# Patient Record
Sex: MALE | Race: WHITE | HISPANIC OR LATINO | URBAN - METROPOLITAN AREA
[De-identification: names, ages, dates, MRNs, and addresses within clinical notes are randomized per-mention and may not be internally consistent; named-entity substitution may affect disease eponyms.]

---

## 2017-03-22 ENCOUNTER — INPATIENT (INPATIENT)
Facility: HOSPITAL | Age: 82
LOS: 13 days | Discharge: NURSING, REHAB OR LONG-TERM CARE | DRG: 242 | End: 2017-04-05
Attending: FAMILY MEDICINE | Admitting: FAMILY MEDICINE
Payer: COMMERCIAL

## 2017-03-22 VITALS
OXYGEN SATURATION: 99 % | HEART RATE: 87 BPM | DIASTOLIC BLOOD PRESSURE: 66 MMHG | TEMPERATURE: 97 F | RESPIRATION RATE: 17 BRPM | HEIGHT: 60 IN | SYSTOLIC BLOOD PRESSURE: 111 MMHG | WEIGHT: 125 LBS

## 2017-03-22 DIAGNOSIS — R55 SYNCOPE AND COLLAPSE: ICD-10-CM

## 2017-03-22 PROCEDURE — 99285 EMERGENCY DEPT VISIT HI MDM: CPT

## 2017-03-22 PROCEDURE — 72125 CT NECK SPINE W/O DYE: CPT | Mod: 26

## 2017-03-22 PROCEDURE — 70450 CT HEAD/BRAIN W/O DYE: CPT | Mod: 26

## 2017-03-22 PROCEDURE — 71020: CPT | Mod: 26

## 2017-03-22 NOTE — ED PROVIDER NOTE - CONDUCTED A DETAILED DISCUSSION WITH PATIENT AND/OR GUARDIAN REGARDING, MDM
radiology results/lab results/need for outpatient follow-up/need to admit/return to ED if symptoms worsen, persist or questions arise

## 2017-03-22 NOTE — ED ADULT NURSE NOTE - OBJECTIVE STATEMENT
Patient complains of dizziness x 2 months, loss of appetite x 1 month. Patient ambulates with assistance, family at bedside. Breathing easy and unlabored, speaking in full sentences, no use of accessory muscles.

## 2017-03-22 NOTE — ED PROVIDER NOTE - NS ED MD SCRIBE ATTENDING SCRIBE SECTIONS
HISTORY OF PRESENT ILLNESS/DISPOSITION/PAST MEDICAL/SURGICAL/SOCIAL HISTORY/VITAL SIGNS( Pullset)/PHYSICAL EXAM/REVIEW OF SYSTEMS/RESULTS

## 2017-03-22 NOTE — ED PROVIDER NOTE - CHPI ED SYMPTOMS NEG
no numbness/no LOC, no tingling, no shortness of breath, no chest pain, no chills, no vomiting/no fever/no nausea/no weakness

## 2017-03-22 NOTE — ED PROVIDER NOTE - MUSCULOSKELETAL, MLM
Spine appears normal, range of motion is not limited, no muscle or joint tenderness. Moving all extremities well; no obvious deformation of extremities. Strength normal.

## 2017-03-22 NOTE — ED PROVIDER NOTE - MEDICAL DECISION MAKING DETAILS
88 y/o M pt c/o dizziness and lightheadedness resulting in mechanical fall today; L lower leg pain after fall. Plan to admit for presyncope, look at EKG, CT Head, CT Spine, labs (troponin orthostatics), urine, and CXR.

## 2017-03-23 DIAGNOSIS — E03.9 HYPOTHYROIDISM, UNSPECIFIED: ICD-10-CM

## 2017-03-23 DIAGNOSIS — E11.9 TYPE 2 DIABETES MELLITUS WITHOUT COMPLICATIONS: ICD-10-CM

## 2017-03-23 DIAGNOSIS — Z41.8 ENCOUNTER FOR OTHER PROCEDURES FOR PURPOSES OTHER THAN REMEDYING HEALTH STATE: ICD-10-CM
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
## 2017-03-23 DIAGNOSIS — I10 ESSENTIAL (PRIMARY) HYPERTENSION: ICD-10-CM

## 2017-03-23 RX ORDER — ASPIRIN/CALCIUM CARB/MAGNESIUM 324 MG
81 TABLET ORAL DAILY
Qty: 0 | Refills: 0 | Status: DISCONTINUED | OUTPATIENT
Start: 2017-03-23 | End: 2017-04-05

## 2017-03-23 RX ORDER — ACETAMINOPHEN 500 MG
650 TABLET ORAL EVERY 6 HOURS
Qty: 0 | Refills: 0 | Status: DISCONTINUED | OUTPATIENT
Start: 2017-03-23 | End: 2017-04-05

## 2017-03-23 RX ORDER — LOSARTAN POTASSIUM 100 MG/1
25 TABLET, FILM COATED ORAL DAILY
Qty: 0 | Refills: 0 | Status: DISCONTINUED | OUTPATIENT
Start: 2017-03-23 | End: 2017-03-31

## 2017-03-23 RX ORDER — GLUCAGON INJECTION, SOLUTION 0.5 MG/.1ML
1 INJECTION, SOLUTION SUBCUTANEOUS ONCE
Qty: 0 | Refills: 0 | Status: DISCONTINUED | OUTPATIENT
Start: 2017-03-23 | End: 2017-04-05

## 2017-03-23 RX ORDER — FINASTERIDE 5 MG/1
5 TABLET, FILM COATED ORAL DAILY
Qty: 0 | Refills: 0 | Status: DISCONTINUED | OUTPATIENT
Start: 2017-03-23 | End: 2017-04-05

## 2017-03-23 RX ORDER — DEXTROSE 50 % IN WATER 50 %
1 SYRINGE (ML) INTRAVENOUS ONCE
Qty: 0 | Refills: 0 | Status: DISCONTINUED | OUTPATIENT
Start: 2017-03-23 | End: 2017-04-05

## 2017-03-23 RX ORDER — LEVOTHYROXINE SODIUM 125 MCG
25 TABLET ORAL DAILY
Qty: 0 | Refills: 0 | Status: DISCONTINUED | OUTPATIENT
Start: 2017-03-23 | End: 2017-04-05

## 2017-03-23 RX ORDER — INSULIN LISPRO 100/ML
VIAL (ML) SUBCUTANEOUS
Qty: 0 | Refills: 0 | Status: DISCONTINUED | OUTPATIENT
Start: 2017-03-23 | End: 2017-04-05

## 2017-03-23 RX ORDER — DEXTROSE 50 % IN WATER 50 %
25 SYRINGE (ML) INTRAVENOUS ONCE
Qty: 0 | Refills: 0 | Status: DISCONTINUED | OUTPATIENT
Start: 2017-03-23 | End: 2017-04-05

## 2017-03-23 RX ORDER — PANTOPRAZOLE SODIUM 20 MG/1
40 TABLET, DELAYED RELEASE ORAL
Qty: 0 | Refills: 0 | Status: DISCONTINUED | OUTPATIENT
Start: 2017-03-23 | End: 2017-04-05

## 2017-03-23 RX ORDER — SODIUM CHLORIDE 9 MG/ML
1000 INJECTION INTRAMUSCULAR; INTRAVENOUS; SUBCUTANEOUS
Qty: 0 | Refills: 0 | Status: DISCONTINUED | OUTPATIENT
Start: 2017-03-23 | End: 2017-03-28

## 2017-03-23 RX ORDER — DEXTROSE 50 % IN WATER 50 %
12.5 SYRINGE (ML) INTRAVENOUS ONCE
Qty: 0 | Refills: 0 | Status: DISCONTINUED | OUTPATIENT
Start: 2017-03-23 | End: 2017-04-05

## 2017-03-23 RX ORDER — DOCUSATE SODIUM 100 MG
100 CAPSULE ORAL DAILY
Qty: 0 | Refills: 0 | Status: DISCONTINUED | OUTPATIENT
Start: 2017-03-23 | End: 2017-04-05

## 2017-03-23 RX ORDER — ATORVASTATIN CALCIUM 80 MG/1
20 TABLET, FILM COATED ORAL AT BEDTIME
Qty: 0 | Refills: 0 | Status: DISCONTINUED | OUTPATIENT
Start: 2017-03-23 | End: 2017-04-05

## 2017-03-23 RX ORDER — SODIUM CHLORIDE 9 MG/ML
1000 INJECTION, SOLUTION INTRAVENOUS
Qty: 0 | Refills: 0 | Status: DISCONTINUED | OUTPATIENT
Start: 2017-03-23 | End: 2017-04-05

## 2017-03-23 RX ADMIN — Medication 25 MICROGRAM(S): at 13:44

## 2017-03-23 RX ADMIN — SODIUM CHLORIDE 75 MILLILITER(S): 9 INJECTION INTRAMUSCULAR; INTRAVENOUS; SUBCUTANEOUS at 18:01

## 2017-03-23 RX ADMIN — Medication 1 DROP(S): at 17:56

## 2017-03-23 RX ADMIN — Medication 650 MILLIGRAM(S): at 22:25

## 2017-03-23 RX ADMIN — SODIUM CHLORIDE 75 MILLILITER(S): 9 INJECTION INTRAMUSCULAR; INTRAVENOUS; SUBCUTANEOUS at 21:22

## 2017-03-23 RX ADMIN — FINASTERIDE 5 MILLIGRAM(S): 5 TABLET, FILM COATED ORAL at 17:56

## 2017-03-23 RX ADMIN — ATORVASTATIN CALCIUM 20 MILLIGRAM(S): 80 TABLET, FILM COATED ORAL at 21:22

## 2017-03-23 RX ADMIN — Medication 1 DROP(S): at 21:22

## 2017-03-23 RX ADMIN — Medication 100 MILLIGRAM(S): at 13:40

## 2017-03-23 RX ADMIN — LOSARTAN POTASSIUM 25 MILLIGRAM(S): 100 TABLET, FILM COATED ORAL at 13:40

## 2017-03-23 RX ADMIN — Medication 650 MILLIGRAM(S): at 21:21

## 2017-03-23 RX ADMIN — Medication 81 MILLIGRAM(S): at 11:46

## 2017-03-23 NOTE — H&P ADULT. - HISTORY OF PRESENT ILLNESS
88 y/o Mauritian speaking M from home walks independently at the baseline   with PMHx of DM, Hypothyroidism, and HTN and no PSHx presents to ED c/o dizziness and lightheadedness resulting in mechanical fall today. Pt claims L lower leg pain s/p fall as well. Pt also claims his dizziness has been ongoing for 2 months. Pt denies LOC, numbness, tingling, weakness, SOB, CP, fevers, chills, nausea, vomiting, or any other complaints. Pt also denies recent illness 88 y/o Yakut speaking M from home walks independently at the baseline , inter pretor number [id number]    with PMHx of DM, Hypothyroidism, and HTN and no PSHx presents to ED c/o dizziness and lightheadedness resulting in mechanical fall today. Pt claims L lower leg pain s/p fall as well. Pt also admits that  his dizziness has been ongoing for 2 months. Pt denies LOC, numbness, tingling, weakness, SOB, Chest pain, fevers, chills, nausea, vomiting, or any other complaints. Pt also denies  any other acute complaints.  patient has also has an episode of fall few months ago when he was trying to get down from the stairs and denies any loss of consiousness or associated symptoms at that time . he also says he has a cardiac problems but unable to describe them properly at this time .  PMH-DM, Hypothyroidism, and HTN  FH- no pertinent family h/o   social h/o - non -smoker , no alcoholic or illicit drug use   IN the E.D patient vitals stable and EKG  NSR WITH prolonged AL interval 242 ms {first degree AV block}  patient unable to remember the name of medications he use at home { carleen andre , [phone] primary team to follow

## 2017-03-23 NOTE — H&P ADULT. - PROBLEM SELECTOR PLAN 5
IMPROVE SCORE OF 1 AND 3 MONTH RISK OF 0.6 %  -C/W dvt prophylaxsis heparin 5000 units 3 times a day

## 2017-03-23 NOTE — H&P ADULT. - RS GEN PE MLT RESP DETAILS PC
clear to auscultation bilaterally/breath sounds equal/no rales/no wheezes/good air movement/no rhonchi/normal/respirations non-labored

## 2017-03-23 NOTE — H&P ADULT. - ASSESSMENT
88 y/o Albanian speaking M from home walks independently at the baseline   with PMHx of DM, Hypothyroidism, and HTN and no PSHx presents to ED c/o dizziness and lightheadedness resulting in mechanical fall today. Pt claims L lower leg pain s/p fall as well. Pt also admits that  his dizziness has been ongoing for 2 months. Pt denies LOC, numbness, tingling, weakness, SOB, Chest pain, fevers, chills, nausea, vomiting, or any other complaints. Pt also denies recent illness .

## 2017-03-23 NOTE — H&P ADULT. - NEGATIVE NEUROLOGICAL SYMPTOMS
no vertigo/no syncope/no generalized seizures/no transient paralysis/no focal seizures/no paresthesias/no tremors/no weakness

## 2017-03-23 NOTE — H&P ADULT. - NEGATIVE GASTROINTESTINAL SYMPTOMS
no diarrhea/no change in bowel habits/no flatulence/no constipation/no abdominal pain/no nausea/no vomiting

## 2017-03-23 NOTE — H&P ADULT. - PROBLEM SELECTOR PLAN 3
- f/u with TSH LEVEL IN AM  - - f/u with TSH LEVEL IN AM  -please contact the pharmacy in am and called the family twice with no response

## 2017-03-23 NOTE — H&P ADULT. - NEGATIVE ENMT SYMPTOMS
no tinnitus/no ear pain/no sinus symptoms/no hearing difficulty/no nasal congestion/no nasal discharge/no vertigo

## 2017-03-23 NOTE — H&P ADULT. - MUSCULOSKELETAL
details… detailed exam normal/ROM intact/no joint erythema/no joint swelling/no joint warmth/no calf tenderness

## 2017-03-23 NOTE — H&P ADULT. - NEGATIVE CARDIOVASCULAR SYMPTOMS
no palpitations/no orthopnea/no dyspnea on exertion/no peripheral edema/no paroxysmal nocturnal dyspnea/no chest pain

## 2017-03-23 NOTE — H&P ADULT. - PROBLEM SELECTOR PLAN 1
- s/p fall { mechanical}  - f/u orthostatics in am  -likely secondary to delayed postural reflexes in old age or secondary  to medication induced{ HTN medications }and also to rule out any diabetic neuropathy   - fall precautions   - primary team to obtain medications from pharmacy   -f/u vitamin d level , b12 and hba1c level   - f/u with PT  evaluation - s/p fall { mechanical}  -orthostatics lying and sitting -negative    -likely secondary to delayed postural reflexes in old age or secondary  to medication induced{ HTN medications }and also to rule out any diabetic neuropathy   - fall precautions   - primary team to obtain medications from pharmacy   -f/u vitamin d level , b12 and hba1c level   - f/u with PT  evaluation - s/p fall { mechanical}  -orthostatics lying and sitting -negative    -likely secondary to delayed postural reflexes in old age or secondary  to medication induced{ HTN medications }and also to rule out any diabetic neuropathy   - fall precautions  - neuro:    - primary team to obtain medications from pharmacy   -f/u vitamin d level , b12 and hba1c level   - f/u with PT  evaluation

## 2017-03-24 LAB
ANION GAP SERPL CALC-SCNC: 9 MMOL/L — SIGNIFICANT CHANGE UP (ref 5–17)
BUN SERPL-MCNC: 27 MG/DL — HIGH (ref 7–18)
CALCIUM SERPL-MCNC: 8.4 MG/DL — SIGNIFICANT CHANGE UP (ref 8.4–10.5)
CHLORIDE SERPL-SCNC: 110 MMOL/L — HIGH (ref 96–108)
CO2 SERPL-SCNC: 25 MMOL/L — SIGNIFICANT CHANGE UP (ref 22–31)
CREAT SERPL-MCNC: 1.21 MG/DL — SIGNIFICANT CHANGE UP (ref 0.5–1.3)
GLUCOSE SERPL-MCNC: 197 MG/DL — HIGH (ref 70–99)
HCT VFR BLD CALC: 35.1 % — LOW (ref 39–50)
HGB BLD-MCNC: 11.9 G/DL — LOW (ref 13–17)
MAGNESIUM SERPL-MCNC: 1.9 MG/DL — SIGNIFICANT CHANGE UP (ref 1.8–2.4)
MCHC RBC-ENTMCNC: 32 PG — SIGNIFICANT CHANGE UP (ref 27–34)
MCHC RBC-ENTMCNC: 33.8 GM/DL — SIGNIFICANT CHANGE UP (ref 32–36)
MCV RBC AUTO: 94.5 FL — SIGNIFICANT CHANGE UP (ref 80–100)
PHOSPHATE SERPL-MCNC: 2.6 MG/DL — SIGNIFICANT CHANGE UP (ref 2.5–4.5)
PLATELET # BLD AUTO: 162 K/UL — SIGNIFICANT CHANGE UP (ref 150–400)
POTASSIUM SERPL-MCNC: 4.1 MMOL/L — SIGNIFICANT CHANGE UP (ref 3.5–5.3)
POTASSIUM SERPL-SCNC: 4.1 MMOL/L — SIGNIFICANT CHANGE UP (ref 3.5–5.3)
RBC # BLD: 3.71 M/UL — LOW (ref 4.2–5.8)
RBC # FLD: 12.4 % — SIGNIFICANT CHANGE UP (ref 10.3–14.5)
SODIUM SERPL-SCNC: 144 MMOL/L — SIGNIFICANT CHANGE UP (ref 135–145)
TSH SERPL-MCNC: 2.24 UU/ML — SIGNIFICANT CHANGE UP (ref 0.34–4.82)
WBC # BLD: 7 K/UL — SIGNIFICANT CHANGE UP (ref 3.8–10.5)
WBC # FLD AUTO: 7 K/UL — SIGNIFICANT CHANGE UP (ref 3.8–10.5)

## 2017-03-24 RX ORDER — SODIUM CHLORIDE 9 MG/ML
1000 INJECTION INTRAMUSCULAR; INTRAVENOUS; SUBCUTANEOUS
Qty: 0 | Refills: 0 | Status: DISCONTINUED | OUTPATIENT
Start: 2017-03-24 | End: 2017-03-28

## 2017-03-24 RX ADMIN — Medication 1 DROP(S): at 17:10

## 2017-03-24 RX ADMIN — LOSARTAN POTASSIUM 25 MILLIGRAM(S): 100 TABLET, FILM COATED ORAL at 06:10

## 2017-03-24 RX ADMIN — Medication 1 DROP(S): at 22:01

## 2017-03-24 RX ADMIN — Medication 1 DROP(S): at 13:24

## 2017-03-24 RX ADMIN — ATORVASTATIN CALCIUM 20 MILLIGRAM(S): 80 TABLET, FILM COATED ORAL at 22:01

## 2017-03-24 RX ADMIN — Medication 1 DROP(S): at 06:10

## 2017-03-24 RX ADMIN — Medication 81 MILLIGRAM(S): at 11:15

## 2017-03-24 RX ADMIN — Medication 25 MICROGRAM(S): at 06:10

## 2017-03-24 RX ADMIN — Medication 1 DROP(S): at 11:14

## 2017-03-24 RX ADMIN — FINASTERIDE 5 MILLIGRAM(S): 5 TABLET, FILM COATED ORAL at 11:15

## 2017-03-24 RX ADMIN — SODIUM CHLORIDE 80 MILLILITER(S): 9 INJECTION INTRAMUSCULAR; INTRAVENOUS; SUBCUTANEOUS at 11:15

## 2017-03-24 RX ADMIN — PANTOPRAZOLE SODIUM 40 MILLIGRAM(S): 20 TABLET, DELAYED RELEASE ORAL at 06:10

## 2017-03-24 RX ADMIN — Medication 100 MILLIGRAM(S): at 11:16

## 2017-03-24 RX ADMIN — SODIUM CHLORIDE 80 MILLILITER(S): 9 INJECTION INTRAMUSCULAR; INTRAVENOUS; SUBCUTANEOUS at 19:39

## 2017-03-24 NOTE — PHYSICAL THERAPY INITIAL EVALUATION ADULT - IMPAIRMENTS FOUND, PT EVAL
gait, locomotion, and balance/aerobic capacity/endurance/posture/muscle strength/gross motor/ergonomics and body mechanics

## 2017-03-24 NOTE — PHYSICAL THERAPY INITIAL EVALUATION ADULT - LEVEL OF INDEPENDENCE: STAIR NEGOTIATION, REHAB EVAL
Unable to assess pt on the stairs at this time, pt does not exhibit appropriate pre requisite skills to safely negotiate unlevel surfaces due to poor endurance, decrease musculature, unsteady gait and safety awareness which placed pt significant risks for falls and injury

## 2017-03-25 LAB
ANION GAP SERPL CALC-SCNC: 8 MMOL/L — SIGNIFICANT CHANGE UP (ref 5–17)
BUN SERPL-MCNC: 28 MG/DL — HIGH (ref 7–18)
CALCIUM SERPL-MCNC: 8.6 MG/DL — SIGNIFICANT CHANGE UP (ref 8.4–10.5)
CHLORIDE SERPL-SCNC: 109 MMOL/L — HIGH (ref 96–108)
CO2 SERPL-SCNC: 25 MMOL/L — SIGNIFICANT CHANGE UP (ref 22–31)
CREAT SERPL-MCNC: 1.2 MG/DL — SIGNIFICANT CHANGE UP (ref 0.5–1.3)
GLUCOSE SERPL-MCNC: 93 MG/DL — SIGNIFICANT CHANGE UP (ref 70–99)
HCT VFR BLD CALC: 35.2 % — LOW (ref 39–50)
HGB BLD-MCNC: 11.9 G/DL — LOW (ref 13–17)
MCHC RBC-ENTMCNC: 31.9 PG — SIGNIFICANT CHANGE UP (ref 27–34)
MCHC RBC-ENTMCNC: 33.8 GM/DL — SIGNIFICANT CHANGE UP (ref 32–36)
MCV RBC AUTO: 94.5 FL — SIGNIFICANT CHANGE UP (ref 80–100)
PLATELET # BLD AUTO: 152 K/UL — SIGNIFICANT CHANGE UP (ref 150–400)
POTASSIUM SERPL-MCNC: 4.1 MMOL/L — SIGNIFICANT CHANGE UP (ref 3.5–5.3)
POTASSIUM SERPL-SCNC: 4.1 MMOL/L — SIGNIFICANT CHANGE UP (ref 3.5–5.3)
RBC # BLD: 3.72 M/UL — LOW (ref 4.2–5.8)
RBC # FLD: 12 % — SIGNIFICANT CHANGE UP (ref 10.3–14.5)
SODIUM SERPL-SCNC: 142 MMOL/L — SIGNIFICANT CHANGE UP (ref 135–145)
WBC # BLD: 8.6 K/UL — SIGNIFICANT CHANGE UP (ref 3.8–10.5)
WBC # FLD AUTO: 8.6 K/UL — SIGNIFICANT CHANGE UP (ref 3.8–10.5)

## 2017-03-25 RX ORDER — PYRIDOSTIGMINE BROMIDE 60 MG/5ML
30 SOLUTION ORAL DAILY
Qty: 0 | Refills: 0 | Status: DISCONTINUED | OUTPATIENT
Start: 2017-03-25 | End: 2017-04-05

## 2017-03-25 RX ADMIN — Medication 1 DROP(S): at 02:27

## 2017-03-25 RX ADMIN — Medication 100 MILLIGRAM(S): at 12:14

## 2017-03-25 RX ADMIN — Medication 1: at 12:13

## 2017-03-25 RX ADMIN — PANTOPRAZOLE SODIUM 40 MILLIGRAM(S): 20 TABLET, DELAYED RELEASE ORAL at 06:11

## 2017-03-25 RX ADMIN — Medication 81 MILLIGRAM(S): at 12:12

## 2017-03-25 RX ADMIN — FINASTERIDE 5 MILLIGRAM(S): 5 TABLET, FILM COATED ORAL at 12:14

## 2017-03-25 RX ADMIN — ATORVASTATIN CALCIUM 20 MILLIGRAM(S): 80 TABLET, FILM COATED ORAL at 21:30

## 2017-03-25 RX ADMIN — Medication 1 DROP(S): at 05:41

## 2017-03-25 RX ADMIN — Medication 1 DROP(S): at 11:25

## 2017-03-25 RX ADMIN — Medication 1 DROP(S): at 14:44

## 2017-03-25 RX ADMIN — Medication 1 DROP(S): at 21:30

## 2017-03-25 RX ADMIN — PYRIDOSTIGMINE BROMIDE 30 MILLIGRAM(S): 60 SOLUTION ORAL at 12:12

## 2017-03-25 RX ADMIN — LOSARTAN POTASSIUM 25 MILLIGRAM(S): 100 TABLET, FILM COATED ORAL at 05:41

## 2017-03-25 RX ADMIN — Medication 25 MICROGRAM(S): at 05:41

## 2017-03-25 RX ADMIN — Medication 1 DROP(S): at 18:15

## 2017-03-26 LAB
ANION GAP SERPL CALC-SCNC: 12 MMOL/L — SIGNIFICANT CHANGE UP (ref 5–17)
BUN SERPL-MCNC: 25 MG/DL — HIGH (ref 7–18)
CALCIUM SERPL-MCNC: 8.9 MG/DL — SIGNIFICANT CHANGE UP (ref 8.4–10.5)
CHLORIDE SERPL-SCNC: 107 MMOL/L — SIGNIFICANT CHANGE UP (ref 96–108)
CO2 SERPL-SCNC: 22 MMOL/L — SIGNIFICANT CHANGE UP (ref 22–31)
CREAT SERPL-MCNC: 1.25 MG/DL — SIGNIFICANT CHANGE UP (ref 0.5–1.3)
GLUCOSE SERPL-MCNC: 82 MG/DL — SIGNIFICANT CHANGE UP (ref 70–99)
POTASSIUM SERPL-MCNC: 4 MMOL/L — SIGNIFICANT CHANGE UP (ref 3.5–5.3)
POTASSIUM SERPL-SCNC: 4 MMOL/L — SIGNIFICANT CHANGE UP (ref 3.5–5.3)
SODIUM SERPL-SCNC: 141 MMOL/L — SIGNIFICANT CHANGE UP (ref 135–145)
VIT B12 SERPL-MCNC: 397 PG/ML — SIGNIFICANT CHANGE UP (ref 243–894)

## 2017-03-26 RX ORDER — MIDODRINE HYDROCHLORIDE 2.5 MG/1
2.5 TABLET ORAL THREE TIMES A DAY
Qty: 0 | Refills: 0 | Status: DISCONTINUED | OUTPATIENT
Start: 2017-03-26 | End: 2017-03-27

## 2017-03-26 RX ADMIN — Medication 1 DROP(S): at 13:44

## 2017-03-26 RX ADMIN — Medication 1 DROP(S): at 11:54

## 2017-03-26 RX ADMIN — Medication 1 DROP(S): at 18:09

## 2017-03-26 RX ADMIN — LOSARTAN POTASSIUM 25 MILLIGRAM(S): 100 TABLET, FILM COATED ORAL at 06:14

## 2017-03-26 RX ADMIN — MIDODRINE HYDROCHLORIDE 2.5 MILLIGRAM(S): 2.5 TABLET ORAL at 21:16

## 2017-03-26 RX ADMIN — Medication 1 DROP(S): at 21:16

## 2017-03-26 RX ADMIN — MIDODRINE HYDROCHLORIDE 2.5 MILLIGRAM(S): 2.5 TABLET ORAL at 13:44

## 2017-03-26 RX ADMIN — ATORVASTATIN CALCIUM 20 MILLIGRAM(S): 80 TABLET, FILM COATED ORAL at 21:16

## 2017-03-26 RX ADMIN — Medication 81 MILLIGRAM(S): at 11:54

## 2017-03-26 RX ADMIN — PYRIDOSTIGMINE BROMIDE 30 MILLIGRAM(S): 60 SOLUTION ORAL at 11:54

## 2017-03-26 RX ADMIN — PANTOPRAZOLE SODIUM 40 MILLIGRAM(S): 20 TABLET, DELAYED RELEASE ORAL at 06:14

## 2017-03-26 RX ADMIN — MIDODRINE HYDROCHLORIDE 2.5 MILLIGRAM(S): 2.5 TABLET ORAL at 06:14

## 2017-03-26 RX ADMIN — Medication 25 MICROGRAM(S): at 06:14

## 2017-03-26 RX ADMIN — Medication 100 MILLIGRAM(S): at 11:54

## 2017-03-26 RX ADMIN — FINASTERIDE 5 MILLIGRAM(S): 5 TABLET, FILM COATED ORAL at 11:55

## 2017-03-26 RX ADMIN — Medication 1 DROP(S): at 06:14

## 2017-03-27 LAB
ANION GAP SERPL CALC-SCNC: 8 MMOL/L — SIGNIFICANT CHANGE UP (ref 5–17)
BUN SERPL-MCNC: 25 MG/DL — HIGH (ref 7–18)
CALCIUM SERPL-MCNC: 8.6 MG/DL — SIGNIFICANT CHANGE UP (ref 8.4–10.5)
CHLORIDE SERPL-SCNC: 106 MMOL/L — SIGNIFICANT CHANGE UP (ref 96–108)
CO2 SERPL-SCNC: 27 MMOL/L — SIGNIFICANT CHANGE UP (ref 22–31)
CREAT SERPL-MCNC: 1.22 MG/DL — SIGNIFICANT CHANGE UP (ref 0.5–1.3)
GLUCOSE SERPL-MCNC: 91 MG/DL — SIGNIFICANT CHANGE UP (ref 70–99)
HCT VFR BLD CALC: 34.3 % — LOW (ref 39–50)
HGB BLD-MCNC: 11.2 G/DL — LOW (ref 13–17)
MCHC RBC-ENTMCNC: 31.7 PG — SIGNIFICANT CHANGE UP (ref 27–34)
MCHC RBC-ENTMCNC: 32.8 GM/DL — SIGNIFICANT CHANGE UP (ref 32–36)
MCV RBC AUTO: 96.7 FL — SIGNIFICANT CHANGE UP (ref 80–100)
PLATELET # BLD AUTO: 162 K/UL — SIGNIFICANT CHANGE UP (ref 150–400)
POTASSIUM SERPL-MCNC: 4.3 MMOL/L — SIGNIFICANT CHANGE UP (ref 3.5–5.3)
POTASSIUM SERPL-SCNC: 4.3 MMOL/L — SIGNIFICANT CHANGE UP (ref 3.5–5.3)
RBC # BLD: 3.55 M/UL — LOW (ref 4.2–5.8)
RBC # FLD: 12 % — SIGNIFICANT CHANGE UP (ref 10.3–14.5)
SODIUM SERPL-SCNC: 141 MMOL/L — SIGNIFICANT CHANGE UP (ref 135–145)
WBC # BLD: 6.9 K/UL — SIGNIFICANT CHANGE UP (ref 3.8–10.5)
WBC # FLD AUTO: 6.9 K/UL — SIGNIFICANT CHANGE UP (ref 3.8–10.5)

## 2017-03-27 RX ORDER — MIDODRINE HYDROCHLORIDE 2.5 MG/1
5 TABLET ORAL THREE TIMES A DAY
Qty: 0 | Refills: 0 | Status: DISCONTINUED | OUTPATIENT
Start: 2017-03-27 | End: 2017-04-04

## 2017-03-27 RX ADMIN — MIDODRINE HYDROCHLORIDE 5 MILLIGRAM(S): 2.5 TABLET ORAL at 21:37

## 2017-03-27 RX ADMIN — PANTOPRAZOLE SODIUM 40 MILLIGRAM(S): 20 TABLET, DELAYED RELEASE ORAL at 06:16

## 2017-03-27 RX ADMIN — Medication 100 MILLIGRAM(S): at 12:17

## 2017-03-27 RX ADMIN — MIDODRINE HYDROCHLORIDE 5 MILLIGRAM(S): 2.5 TABLET ORAL at 16:09

## 2017-03-27 RX ADMIN — LOSARTAN POTASSIUM 25 MILLIGRAM(S): 100 TABLET, FILM COATED ORAL at 05:28

## 2017-03-27 RX ADMIN — Medication 650 MILLIGRAM(S): at 21:37

## 2017-03-27 RX ADMIN — Medication 81 MILLIGRAM(S): at 12:17

## 2017-03-27 RX ADMIN — MIDODRINE HYDROCHLORIDE 2.5 MILLIGRAM(S): 2.5 TABLET ORAL at 05:28

## 2017-03-27 RX ADMIN — Medication 1 DROP(S): at 16:09

## 2017-03-27 RX ADMIN — FINASTERIDE 5 MILLIGRAM(S): 5 TABLET, FILM COATED ORAL at 12:17

## 2017-03-27 RX ADMIN — Medication 1 DROP(S): at 10:52

## 2017-03-27 RX ADMIN — ATORVASTATIN CALCIUM 20 MILLIGRAM(S): 80 TABLET, FILM COATED ORAL at 21:37

## 2017-03-27 RX ADMIN — Medication 25 MICROGRAM(S): at 05:27

## 2017-03-27 RX ADMIN — Medication 1 DROP(S): at 05:27

## 2017-03-27 RX ADMIN — Medication 1 DROP(S): at 17:15

## 2017-03-27 RX ADMIN — Medication 650 MILLIGRAM(S): at 22:52

## 2017-03-27 RX ADMIN — PYRIDOSTIGMINE BROMIDE 30 MILLIGRAM(S): 60 SOLUTION ORAL at 12:17

## 2017-03-28 PROCEDURE — 70551 MRI BRAIN STEM W/O DYE: CPT | Mod: 26

## 2017-03-28 RX ADMIN — MIDODRINE HYDROCHLORIDE 5 MILLIGRAM(S): 2.5 TABLET ORAL at 05:16

## 2017-03-28 RX ADMIN — Medication 1 DROP(S): at 14:46

## 2017-03-28 RX ADMIN — Medication 1 DROP(S): at 17:52

## 2017-03-28 RX ADMIN — MIDODRINE HYDROCHLORIDE 5 MILLIGRAM(S): 2.5 TABLET ORAL at 14:46

## 2017-03-28 RX ADMIN — Medication 1 DROP(S): at 10:13

## 2017-03-28 RX ADMIN — MIDODRINE HYDROCHLORIDE 5 MILLIGRAM(S): 2.5 TABLET ORAL at 21:28

## 2017-03-28 RX ADMIN — Medication 1 DROP(S): at 05:16

## 2017-03-28 RX ADMIN — LOSARTAN POTASSIUM 25 MILLIGRAM(S): 100 TABLET, FILM COATED ORAL at 05:16

## 2017-03-28 RX ADMIN — ATORVASTATIN CALCIUM 20 MILLIGRAM(S): 80 TABLET, FILM COATED ORAL at 21:28

## 2017-03-28 RX ADMIN — FINASTERIDE 5 MILLIGRAM(S): 5 TABLET, FILM COATED ORAL at 12:19

## 2017-03-28 RX ADMIN — PYRIDOSTIGMINE BROMIDE 30 MILLIGRAM(S): 60 SOLUTION ORAL at 12:19

## 2017-03-28 RX ADMIN — Medication 1 DROP(S): at 21:29

## 2017-03-28 RX ADMIN — Medication 25 MICROGRAM(S): at 05:15

## 2017-03-28 RX ADMIN — Medication 81 MILLIGRAM(S): at 12:18

## 2017-03-28 RX ADMIN — PANTOPRAZOLE SODIUM 40 MILLIGRAM(S): 20 TABLET, DELAYED RELEASE ORAL at 05:16

## 2017-03-28 RX ADMIN — Medication 100 MILLIGRAM(S): at 12:19

## 2017-03-28 NOTE — DIETITIAN INITIAL EVALUATION ADULT. - OTHER INFO
patient reports 13% wt loss from usual but time frame not available. skin- intact patient reports 13% wt loss from usual but time frame not available ( 67 to 58kg). skin- intact

## 2017-03-28 NOTE — DIETITIAN INITIAL EVALUATION ADULT. - PROBLEM SELECTOR PLAN 1
- s/p fall { mechanical}  -orthostatics lying and sitting -negative    -likely secondary to delayed postural reflexes in old age or secondary  to medication induced{ HTN medications }and also to rule out any diabetic neuropathy   - fall precautions  - neuro:    - primary team to obtain medications from pharmacy   -f/u vitamin d level , b12 and hba1c level   - f/u with PT  evaluation

## 2017-03-28 NOTE — DIETITIAN INITIAL EVALUATION ADULT. - PROBLEM SELECTOR PLAN 3
- f/u with TSH LEVEL IN AM  -please contact the pharmacy in am and called the family twice with no response

## 2017-03-29 RX ORDER — SODIUM CHLORIDE 9 MG/ML
1000 INJECTION, SOLUTION INTRAVENOUS
Qty: 0 | Refills: 0 | Status: DISCONTINUED | OUTPATIENT
Start: 2017-03-29 | End: 2017-04-05

## 2017-03-29 RX ADMIN — MIDODRINE HYDROCHLORIDE 5 MILLIGRAM(S): 2.5 TABLET ORAL at 05:30

## 2017-03-29 RX ADMIN — Medication 1 DROP(S): at 13:13

## 2017-03-29 RX ADMIN — MIDODRINE HYDROCHLORIDE 5 MILLIGRAM(S): 2.5 TABLET ORAL at 13:13

## 2017-03-29 RX ADMIN — Medication 1 DROP(S): at 22:18

## 2017-03-29 RX ADMIN — ATORVASTATIN CALCIUM 20 MILLIGRAM(S): 80 TABLET, FILM COATED ORAL at 22:18

## 2017-03-29 RX ADMIN — Medication 25 MICROGRAM(S): at 05:30

## 2017-03-29 RX ADMIN — FINASTERIDE 5 MILLIGRAM(S): 5 TABLET, FILM COATED ORAL at 11:57

## 2017-03-29 RX ADMIN — Medication 100 MILLIGRAM(S): at 11:57

## 2017-03-29 RX ADMIN — LOSARTAN POTASSIUM 25 MILLIGRAM(S): 100 TABLET, FILM COATED ORAL at 05:30

## 2017-03-29 RX ADMIN — MIDODRINE HYDROCHLORIDE 5 MILLIGRAM(S): 2.5 TABLET ORAL at 22:18

## 2017-03-29 RX ADMIN — Medication 1 DROP(S): at 17:36

## 2017-03-29 RX ADMIN — Medication 81 MILLIGRAM(S): at 11:56

## 2017-03-29 RX ADMIN — PYRIDOSTIGMINE BROMIDE 30 MILLIGRAM(S): 60 SOLUTION ORAL at 11:56

## 2017-03-29 RX ADMIN — PANTOPRAZOLE SODIUM 40 MILLIGRAM(S): 20 TABLET, DELAYED RELEASE ORAL at 05:31

## 2017-03-30 ENCOUNTER — TRANSCRIPTION ENCOUNTER (OUTPATIENT)
Age: 82
End: 2017-03-30

## 2017-03-30 PROCEDURE — 71010: CPT | Mod: 26

## 2017-03-30 PROCEDURE — 71010: CPT | Mod: 26,77

## 2017-03-30 RX ORDER — ACETAMINOPHEN 500 MG
1000 TABLET ORAL ONCE
Qty: 0 | Refills: 0 | Status: DISCONTINUED | OUTPATIENT
Start: 2017-03-30 | End: 2017-03-31

## 2017-03-30 RX ORDER — CHLORHEXIDINE GLUCONATE 213 G/1000ML
1 SOLUTION TOPICAL ONCE
Qty: 0 | Refills: 0 | Status: COMPLETED | OUTPATIENT
Start: 2017-03-30 | End: 2017-03-30

## 2017-03-30 RX ORDER — CEFAZOLIN SODIUM 1 G
2000 VIAL (EA) INJECTION EVERY 8 HOURS
Qty: 0 | Refills: 0 | Status: COMPLETED | OUTPATIENT
Start: 2017-03-31 | End: 2017-03-31

## 2017-03-30 RX ADMIN — Medication 650 MILLIGRAM(S): at 21:21

## 2017-03-30 RX ADMIN — SODIUM CHLORIDE 60 MILLILITER(S): 9 INJECTION, SOLUTION INTRAVENOUS at 00:00

## 2017-03-30 RX ADMIN — ATORVASTATIN CALCIUM 20 MILLIGRAM(S): 80 TABLET, FILM COATED ORAL at 21:21

## 2017-03-30 RX ADMIN — Medication 1 DROP(S): at 05:18

## 2017-03-30 RX ADMIN — MIDODRINE HYDROCHLORIDE 5 MILLIGRAM(S): 2.5 TABLET ORAL at 21:21

## 2017-03-30 RX ADMIN — CHLORHEXIDINE GLUCONATE 1 APPLICATION(S): 213 SOLUTION TOPICAL at 08:10

## 2017-03-30 RX ADMIN — MIDODRINE HYDROCHLORIDE 5 MILLIGRAM(S): 2.5 TABLET ORAL at 05:17

## 2017-03-30 RX ADMIN — Medication 1 DROP(S): at 21:21

## 2017-03-30 RX ADMIN — Medication 25 MICROGRAM(S): at 05:17

## 2017-03-30 RX ADMIN — LOSARTAN POTASSIUM 25 MILLIGRAM(S): 100 TABLET, FILM COATED ORAL at 05:18

## 2017-03-30 RX ADMIN — Medication 1 DROP(S): at 14:23

## 2017-03-30 RX ADMIN — PANTOPRAZOLE SODIUM 40 MILLIGRAM(S): 20 TABLET, DELAYED RELEASE ORAL at 05:18

## 2017-03-30 RX ADMIN — Medication 650 MILLIGRAM(S): at 23:00

## 2017-03-31 LAB
ANION GAP SERPL CALC-SCNC: 10 MMOL/L — SIGNIFICANT CHANGE UP (ref 5–17)
BUN SERPL-MCNC: 22 MG/DL — HIGH (ref 7–18)
CALCIUM SERPL-MCNC: 8.6 MG/DL — SIGNIFICANT CHANGE UP (ref 8.4–10.5)
CHLORIDE SERPL-SCNC: 109 MMOL/L — HIGH (ref 96–108)
CO2 SERPL-SCNC: 24 MMOL/L — SIGNIFICANT CHANGE UP (ref 22–31)
CREAT SERPL-MCNC: 1.27 MG/DL — SIGNIFICANT CHANGE UP (ref 0.5–1.3)
GLUCOSE SERPL-MCNC: 104 MG/DL — HIGH (ref 70–99)
MAGNESIUM SERPL-MCNC: 1.6 MG/DL — LOW (ref 1.8–2.4)
PHOSPHATE SERPL-MCNC: 3.4 MG/DL — SIGNIFICANT CHANGE UP (ref 2.5–4.5)
POTASSIUM SERPL-MCNC: 4 MMOL/L — SIGNIFICANT CHANGE UP (ref 3.5–5.3)
POTASSIUM SERPL-SCNC: 4 MMOL/L — SIGNIFICANT CHANGE UP (ref 3.5–5.3)
SODIUM SERPL-SCNC: 143 MMOL/L — SIGNIFICANT CHANGE UP (ref 135–145)

## 2017-03-31 RX ORDER — MAGNESIUM SULFATE 500 MG/ML
2 VIAL (ML) INJECTION ONCE
Qty: 0 | Refills: 0 | Status: COMPLETED | OUTPATIENT
Start: 2017-03-31 | End: 2017-03-31

## 2017-03-31 RX ORDER — MAGNESIUM SULFATE 500 MG/ML
1 VIAL (ML) INJECTION ONCE
Qty: 0 | Refills: 0 | Status: COMPLETED | OUTPATIENT
Start: 2017-03-31 | End: 2017-03-31

## 2017-03-31 RX ORDER — METOPROLOL TARTRATE 50 MG
12.5 TABLET ORAL
Qty: 0 | Refills: 0 | Status: DISCONTINUED | OUTPATIENT
Start: 2017-03-31 | End: 2017-04-05

## 2017-03-31 RX ADMIN — Medication 1 DROP(S): at 21:19

## 2017-03-31 RX ADMIN — LOSARTAN POTASSIUM 25 MILLIGRAM(S): 100 TABLET, FILM COATED ORAL at 06:01

## 2017-03-31 RX ADMIN — Medication 100 MILLIGRAM(S): at 15:26

## 2017-03-31 RX ADMIN — MIDODRINE HYDROCHLORIDE 5 MILLIGRAM(S): 2.5 TABLET ORAL at 21:18

## 2017-03-31 RX ADMIN — MIDODRINE HYDROCHLORIDE 5 MILLIGRAM(S): 2.5 TABLET ORAL at 06:01

## 2017-03-31 RX ADMIN — Medication 100 GRAM(S): at 17:26

## 2017-03-31 RX ADMIN — MIDODRINE HYDROCHLORIDE 5 MILLIGRAM(S): 2.5 TABLET ORAL at 15:26

## 2017-03-31 RX ADMIN — Medication 100 MILLIGRAM(S): at 11:39

## 2017-03-31 RX ADMIN — Medication 25 MICROGRAM(S): at 06:01

## 2017-03-31 RX ADMIN — FINASTERIDE 5 MILLIGRAM(S): 5 TABLET, FILM COATED ORAL at 11:40

## 2017-03-31 RX ADMIN — Medication 25 GRAM(S): at 20:15

## 2017-03-31 RX ADMIN — PANTOPRAZOLE SODIUM 40 MILLIGRAM(S): 20 TABLET, DELAYED RELEASE ORAL at 06:01

## 2017-03-31 RX ADMIN — Medication 100 MILLIGRAM(S): at 23:44

## 2017-03-31 RX ADMIN — Medication 1 DROP(S): at 11:39

## 2017-03-31 RX ADMIN — ATORVASTATIN CALCIUM 20 MILLIGRAM(S): 80 TABLET, FILM COATED ORAL at 21:18

## 2017-03-31 RX ADMIN — Medication 1 DROP(S): at 15:26

## 2017-03-31 RX ADMIN — PYRIDOSTIGMINE BROMIDE 30 MILLIGRAM(S): 60 SOLUTION ORAL at 11:39

## 2017-03-31 RX ADMIN — Medication 100 MILLIGRAM(S): at 07:37

## 2017-03-31 RX ADMIN — Medication 1 DROP(S): at 06:01

## 2017-03-31 RX ADMIN — Medication 81 MILLIGRAM(S): at 11:40

## 2017-04-01 LAB
ANION GAP SERPL CALC-SCNC: 9 MMOL/L — SIGNIFICANT CHANGE UP (ref 5–17)
BASOPHILS # BLD AUTO: 0.1 K/UL — SIGNIFICANT CHANGE UP (ref 0–0.2)
BASOPHILS NFR BLD AUTO: 0.9 % — SIGNIFICANT CHANGE UP (ref 0–2)
BUN SERPL-MCNC: 17 MG/DL — SIGNIFICANT CHANGE UP (ref 7–18)
CALCIUM SERPL-MCNC: 8.7 MG/DL — SIGNIFICANT CHANGE UP (ref 8.4–10.5)
CHLORIDE SERPL-SCNC: 107 MMOL/L — SIGNIFICANT CHANGE UP (ref 96–108)
CO2 SERPL-SCNC: 25 MMOL/L — SIGNIFICANT CHANGE UP (ref 22–31)
CREAT SERPL-MCNC: 1.2 MG/DL — SIGNIFICANT CHANGE UP (ref 0.5–1.3)
EOSINOPHIL # BLD AUTO: 0.4 K/UL — SIGNIFICANT CHANGE UP (ref 0–0.5)
EOSINOPHIL NFR BLD AUTO: 5.8 % — SIGNIFICANT CHANGE UP (ref 0–6)
GLUCOSE SERPL-MCNC: 95 MG/DL — SIGNIFICANT CHANGE UP (ref 70–99)
HCT VFR BLD CALC: 36.2 % — LOW (ref 39–50)
HGB BLD-MCNC: 12.1 G/DL — LOW (ref 13–17)
LYMPHOCYTES # BLD AUTO: 1.4 K/UL — SIGNIFICANT CHANGE UP (ref 1–3.3)
LYMPHOCYTES # BLD AUTO: 18.8 % — SIGNIFICANT CHANGE UP (ref 13–44)
MCHC RBC-ENTMCNC: 32 PG — SIGNIFICANT CHANGE UP (ref 27–34)
MCHC RBC-ENTMCNC: 33.4 GM/DL — SIGNIFICANT CHANGE UP (ref 32–36)
MCV RBC AUTO: 95.8 FL — SIGNIFICANT CHANGE UP (ref 80–100)
MONOCYTES # BLD AUTO: 0.8 K/UL — SIGNIFICANT CHANGE UP (ref 0–0.9)
MONOCYTES NFR BLD AUTO: 10.1 % — SIGNIFICANT CHANGE UP (ref 2–14)
NEUTROPHILS # BLD AUTO: 4.8 K/UL — SIGNIFICANT CHANGE UP (ref 1.8–7.4)
NEUTROPHILS NFR BLD AUTO: 64.4 % — SIGNIFICANT CHANGE UP (ref 43–77)
PLATELET # BLD AUTO: 214 K/UL — SIGNIFICANT CHANGE UP (ref 150–400)
POTASSIUM SERPL-MCNC: 4 MMOL/L — SIGNIFICANT CHANGE UP (ref 3.5–5.3)
POTASSIUM SERPL-SCNC: 4 MMOL/L — SIGNIFICANT CHANGE UP (ref 3.5–5.3)
RBC # BLD: 3.78 M/UL — LOW (ref 4.2–5.8)
RBC # FLD: 11.9 % — SIGNIFICANT CHANGE UP (ref 10.3–14.5)
SODIUM SERPL-SCNC: 141 MMOL/L — SIGNIFICANT CHANGE UP (ref 135–145)
WBC # BLD: 7.5 K/UL — SIGNIFICANT CHANGE UP (ref 3.8–10.5)
WBC # FLD AUTO: 7.5 K/UL — SIGNIFICANT CHANGE UP (ref 3.8–10.5)

## 2017-04-01 RX ORDER — LEVOTHYROXINE SODIUM 125 MCG
1 TABLET ORAL
Qty: 0 | Refills: 0 | COMMUNITY
Start: 2017-04-01

## 2017-04-01 RX ORDER — METFORMIN HYDROCHLORIDE 850 MG/1
1 TABLET ORAL
Qty: 0 | Refills: 0 | COMMUNITY

## 2017-04-01 RX ORDER — ATORVASTATIN CALCIUM 80 MG/1
1 TABLET, FILM COATED ORAL
Qty: 0 | Refills: 0 | COMMUNITY
Start: 2017-04-01

## 2017-04-01 RX ORDER — PYRIDOSTIGMINE BROMIDE 60 MG/5ML
0.5 SOLUTION ORAL
Qty: 0 | Refills: 0 | COMMUNITY
Start: 2017-04-01

## 2017-04-01 RX ORDER — LOSARTAN POTASSIUM 100 MG/1
1 TABLET, FILM COATED ORAL
Qty: 0 | Refills: 0 | COMMUNITY

## 2017-04-01 RX ORDER — ASPIRIN/CALCIUM CARB/MAGNESIUM 324 MG
1 TABLET ORAL
Qty: 0 | Refills: 0 | COMMUNITY
Start: 2017-04-01

## 2017-04-01 RX ORDER — LEVOTHYROXINE SODIUM 125 MCG
1 TABLET ORAL
Qty: 0 | Refills: 0 | COMMUNITY

## 2017-04-01 RX ORDER — MIDODRINE HYDROCHLORIDE 2.5 MG/1
1 TABLET ORAL
Qty: 0 | Refills: 0 | COMMUNITY
Start: 2017-04-01

## 2017-04-01 RX ORDER — METOPROLOL TARTRATE 50 MG
12.5 TABLET ORAL
Qty: 0 | Refills: 0 | COMMUNITY
Start: 2017-04-01

## 2017-04-01 RX ORDER — SIMVASTATIN 20 MG/1
1 TABLET, FILM COATED ORAL
Qty: 0 | Refills: 0 | COMMUNITY

## 2017-04-01 RX ADMIN — Medication 1 DROP(S): at 21:29

## 2017-04-01 RX ADMIN — Medication 12.5 MILLIGRAM(S): at 18:04

## 2017-04-01 RX ADMIN — PYRIDOSTIGMINE BROMIDE 30 MILLIGRAM(S): 60 SOLUTION ORAL at 11:42

## 2017-04-01 RX ADMIN — MIDODRINE HYDROCHLORIDE 5 MILLIGRAM(S): 2.5 TABLET ORAL at 06:13

## 2017-04-01 RX ADMIN — Medication 1 DROP(S): at 13:16

## 2017-04-01 RX ADMIN — Medication 25 MICROGRAM(S): at 06:13

## 2017-04-01 RX ADMIN — Medication 81 MILLIGRAM(S): at 11:42

## 2017-04-01 RX ADMIN — FINASTERIDE 5 MILLIGRAM(S): 5 TABLET, FILM COATED ORAL at 11:42

## 2017-04-01 RX ADMIN — ATORVASTATIN CALCIUM 20 MILLIGRAM(S): 80 TABLET, FILM COATED ORAL at 21:29

## 2017-04-01 RX ADMIN — Medication 1 DROP(S): at 06:13

## 2017-04-01 RX ADMIN — MIDODRINE HYDROCHLORIDE 5 MILLIGRAM(S): 2.5 TABLET ORAL at 13:17

## 2017-04-01 RX ADMIN — PANTOPRAZOLE SODIUM 40 MILLIGRAM(S): 20 TABLET, DELAYED RELEASE ORAL at 06:13

## 2017-04-01 RX ADMIN — Medication 100 MILLIGRAM(S): at 11:42

## 2017-04-01 RX ADMIN — Medication 12.5 MILLIGRAM(S): at 06:13

## 2017-04-01 RX ADMIN — Medication 1 DROP(S): at 09:15

## 2017-04-01 RX ADMIN — MIDODRINE HYDROCHLORIDE 5 MILLIGRAM(S): 2.5 TABLET ORAL at 21:29

## 2017-04-01 RX ADMIN — Medication 1 DROP(S): at 17:56

## 2017-04-01 NOTE — DISCHARGE NOTE ADULT - PATIENT PORTAL LINK FT
“You can access the [employer] Patient Portal, offered by [facility], by registering with the following website: [email or web address]”

## 2017-04-01 NOTE — DISCHARGE NOTE ADULT - PLAN OF CARE
Secondary to autonomic dysfunction and type 2 heart block. PPM was placed and orthostatic was positive due to autonomic dysfunction. Blood sugars were controlled without any diabetic medication. Continue BP medication as prescribed. Continue synthroid. PPM was placed and orthostatic was positive due to autonomic dysfunction. Neurology Dr Gallegos was consulted and recommended pyridostigmine and midodrine, as patient has orthostatics due to autonomic dysfunction. Patient has some tremors on hands for 10 seconds EEG was ordered and patient refused EEG. Psych was consulted for capacity and patient has capacity to make medical decision. Blood sugars were controlled without any diabetic medication. Monitor FG at [facility] Continue BP medication as prescribed. Follow up with PCP after discharge from [facility]

## 2017-04-01 NOTE — DISCHARGE NOTE ADULT - CARE PLAN
Principal Discharge DX:	Syncope and collapse  Goal:	Secondary to autonomic dysfunction and type 2 heart block.  Instructions for follow-up, activity and diet:	PPM was placed and orthostatic was positive due to autonomic dysfunction.  Secondary Diagnosis:	Diabetes  Instructions for follow-up, activity and diet:	Blood sugars were controlled without any diabetic medication.  Secondary Diagnosis:	HTN (hypertension)  Instructions for follow-up, activity and diet:	Continue BP medication as prescribed.  Secondary Diagnosis:	Hypothyroid  Instructions for follow-up, activity and diet:	Continue synthroid. Principal Discharge DX:	Syncope and collapse  Goal:	Secondary to autonomic dysfunction and type 2 heart block.  Instructions for follow-up, activity and diet:	PPM was placed and orthostatic was positive due to autonomic dysfunction. Neurology Dr Gallegos was consulted and recommended pyridostigmine and midodrine, as patient has orthostatics due to autonomic dysfunction. Patient has some tremors on hands for 10 seconds EEG was ordered and patient refused EEG. Psych was consulted for capacity and patient has capacity to make medical decision.  Secondary Diagnosis:	Diabetes  Instructions for follow-up, activity and diet:	Blood sugars were controlled without any diabetic medication. Monitor FG at [facility]  Secondary Diagnosis:	HTN (hypertension)  Instructions for follow-up, activity and diet:	Continue BP medication as prescribed. Follow up with PCP after discharge from [facility]  Secondary Diagnosis:	Hypothyroid  Instructions for follow-up, activity and diet:	Continue synthroid. Principal Discharge DX:	Syncope and collapse  Goal:	Secondary to autonomic dysfunction and type 2 heart block.  Instructions for follow-up, activity and diet:	PPM was placed and orthostatic was positive due to autonomic dysfunction. Neurology Dr Gallegos was consulted and recommended pyridostigmine and midodrine, as patient has orthostatics due to autonomic dysfunction. Patient has some tremors on hands for 10 seconds EEG was ordered and patient refused EEG. Psych was consulted for capacity and patient has capacity to make medical decision.  Secondary Diagnosis:	Diabetes  Instructions for follow-up, activity and diet:	Blood sugars were controlled without any diabetic medication. Monitor FG at [facility]  Secondary Diagnosis:	HTN (hypertension)  Instructions for follow-up, activity and diet:	Continue BP medication as prescribed. Follow up with PCP after discharge from [facility]  Secondary Diagnosis:	Hypothyroid  Instructions for follow-up, activity and diet:	Continue synthroid. Principal Discharge DX:	Syncope and collapse  Goal:	Secondary to autonomic dysfunction and type 2 heart block.  Instructions for follow-up, activity and diet:	PPM was placed and orthostatic was positive due to autonomic dysfunction. Neurology Dr Gallegos was consulted and recommended pyridostigmine and midodrine, as patient has orthostatics due to autonomic dysfunction. Patient has some tremors on hands for 10 seconds EEG was ordered and patient refused EEG. Psych was consulted for capacity and patient has capacity to make medical decision.  Secondary Diagnosis:	Diabetes  Instructions for follow-up, activity and diet:	Blood sugars were controlled without any diabetic medication. Monitor FG at [facility]  Secondary Diagnosis:	HTN (hypertension)  Instructions for follow-up, activity and diet:	Continue BP medication as prescribed. Follow up with PCP after discharge from [facility]  Secondary Diagnosis:	Hypothyroid  Instructions for follow-up, activity and diet:	Continue synthroid.

## 2017-04-01 NOTE — DISCHARGE NOTE ADULT - MEDICATION SUMMARY - MEDICATIONS TO STOP TAKING
I will STOP taking the medications listed below when I get home from the hospital:    metFORMIN 500 mg oral tablet, extended release  -- 1 tab(s) by mouth once a day

## 2017-04-01 NOTE — DISCHARGE NOTE ADULT - HOSPITAL COURSE
88 y/o Bengali speaking M from home walks independently at the baseline   with PMHx of DM, Hypothyroidism, and HTN and no PSHx presents to ED c/o dizziness and lightheadedness resulting in mechanical fall today.     Patient was admitted for presyncope and fall likely from dehydration. Orthostatics lying and sitting -positive. Patient was started on IVF and repeat orthostatic still was positive. Patient was monitored on Telemetry and type 2 second degree heart block was found. Cardio Dr Zhu was consulted and plan was made to put a pacemaker.     Neurology Dr Gallegos was consulted and recommended pyridostigmine and midodrine, as patient has orthostatics due to autonomic dysfunction.     Patient had FANY on admission likely from dehydration, pre renal. Patient was started on IVF, and FANY resolved.  Patient blood sugars were controlled without any diabetic medication.     PT was consulted and recommended [facility]      #Diabetes.    c/w hss for now     #Hypothyroid.   c/w home dose of synthroid    #HTN (hypertension).  c/w losartan  Need for prophylactic measure.  -C/W dvt prophylaxsis heparin 5000 units 3 times a day. 88 y/o Syriac speaking M from home walks independently at the baseline   with PMHx of DM, Hypothyroidism, and HTN and no PSHx presents to ED c/o dizziness and lightheadedness resulting in mechanical fall today.     Patient was admitted for presyncope and fall likely from dehydration. Orthostatics lying and sitting -positive. Patient was started on IVF and repeat orthostatic still was positive. Patient was monitored on Telemetry and type 2 second degree heart block was found. Cardio Dr Zhu was consulted and  pacemaker was placed. Patient was orthostatic positive and dizzy. Midodrine and pyridostigmine was started. Patient has some tremors on hands for 10 seconds EEG was ordered and patient refused EEG. Psych was consulted for capacity and patient has capacity to make medical decision.     Neurology Dr Gallegos was consulted and recommended pyridostigmine and midodrine, as patient has orthostatics due to autonomic dysfunction.     Patient had FANY on admission likely from dehydration, pre renal. Patient was started on IVF, and FANY resolved.  Patient blood sugars were controlled without any diabetic medication.     PT was consulted and recommended [facility]    Diabetes: Monitor FG and continue sliding scale for now     Hypothyroid: Continue synthroid    HTN (hypertension).  Continue losartan.

## 2017-04-02 RX ADMIN — Medication 1 DROP(S): at 13:14

## 2017-04-02 RX ADMIN — Medication 1 DROP(S): at 09:46

## 2017-04-02 RX ADMIN — PANTOPRAZOLE SODIUM 40 MILLIGRAM(S): 20 TABLET, DELAYED RELEASE ORAL at 06:02

## 2017-04-02 RX ADMIN — Medication 12.5 MILLIGRAM(S): at 17:01

## 2017-04-02 RX ADMIN — MIDODRINE HYDROCHLORIDE 5 MILLIGRAM(S): 2.5 TABLET ORAL at 05:30

## 2017-04-02 RX ADMIN — Medication 25 MICROGRAM(S): at 05:30

## 2017-04-02 RX ADMIN — PYRIDOSTIGMINE BROMIDE 30 MILLIGRAM(S): 60 SOLUTION ORAL at 11:30

## 2017-04-02 RX ADMIN — Medication 100 MILLIGRAM(S): at 11:31

## 2017-04-02 RX ADMIN — FINASTERIDE 5 MILLIGRAM(S): 5 TABLET, FILM COATED ORAL at 11:31

## 2017-04-02 RX ADMIN — MIDODRINE HYDROCHLORIDE 5 MILLIGRAM(S): 2.5 TABLET ORAL at 13:14

## 2017-04-02 RX ADMIN — Medication 81 MILLIGRAM(S): at 11:30

## 2017-04-02 RX ADMIN — MIDODRINE HYDROCHLORIDE 5 MILLIGRAM(S): 2.5 TABLET ORAL at 21:31

## 2017-04-02 RX ADMIN — ATORVASTATIN CALCIUM 20 MILLIGRAM(S): 80 TABLET, FILM COATED ORAL at 21:31

## 2017-04-02 RX ADMIN — Medication 12.5 MILLIGRAM(S): at 05:30

## 2017-04-02 RX ADMIN — Medication 1 DROP(S): at 17:01

## 2017-04-02 RX ADMIN — Medication 1 DROP(S): at 21:31

## 2017-04-02 RX ADMIN — Medication 1 DROP(S): at 05:30

## 2017-04-03 RX ORDER — LACTULOSE 10 G/15ML
10 SOLUTION ORAL ONCE
Qty: 0 | Refills: 0 | Status: COMPLETED | OUTPATIENT
Start: 2017-04-03 | End: 2017-04-03

## 2017-04-03 RX ORDER — SENNA PLUS 8.6 MG/1
2 TABLET ORAL AT BEDTIME
Qty: 0 | Refills: 0 | Status: DISCONTINUED | OUTPATIENT
Start: 2017-04-03 | End: 2017-04-05

## 2017-04-03 RX ADMIN — MIDODRINE HYDROCHLORIDE 5 MILLIGRAM(S): 2.5 TABLET ORAL at 21:15

## 2017-04-03 RX ADMIN — Medication 1 DROP(S): at 12:06

## 2017-04-03 RX ADMIN — FINASTERIDE 5 MILLIGRAM(S): 5 TABLET, FILM COATED ORAL at 12:02

## 2017-04-03 RX ADMIN — Medication 1: at 12:00

## 2017-04-03 RX ADMIN — PYRIDOSTIGMINE BROMIDE 30 MILLIGRAM(S): 60 SOLUTION ORAL at 12:02

## 2017-04-03 RX ADMIN — SENNA PLUS 2 TABLET(S): 8.6 TABLET ORAL at 21:15

## 2017-04-03 RX ADMIN — Medication 12.5 MILLIGRAM(S): at 17:55

## 2017-04-03 RX ADMIN — Medication 1 DROP(S): at 21:15

## 2017-04-03 RX ADMIN — Medication 100 MILLIGRAM(S): at 12:02

## 2017-04-03 RX ADMIN — Medication 12.5 MILLIGRAM(S): at 06:10

## 2017-04-03 RX ADMIN — ATORVASTATIN CALCIUM 20 MILLIGRAM(S): 80 TABLET, FILM COATED ORAL at 21:15

## 2017-04-03 RX ADMIN — Medication 25 MICROGRAM(S): at 06:10

## 2017-04-03 RX ADMIN — Medication 1 DROP(S): at 14:15

## 2017-04-03 RX ADMIN — Medication 1 DROP(S): at 06:10

## 2017-04-03 RX ADMIN — Medication 1 DROP(S): at 17:55

## 2017-04-03 RX ADMIN — MIDODRINE HYDROCHLORIDE 5 MILLIGRAM(S): 2.5 TABLET ORAL at 06:10

## 2017-04-03 RX ADMIN — PANTOPRAZOLE SODIUM 40 MILLIGRAM(S): 20 TABLET, DELAYED RELEASE ORAL at 06:10

## 2017-04-03 RX ADMIN — LACTULOSE 10 GRAM(S): 10 SOLUTION ORAL at 14:14

## 2017-04-03 RX ADMIN — MIDODRINE HYDROCHLORIDE 5 MILLIGRAM(S): 2.5 TABLET ORAL at 14:15

## 2017-04-03 RX ADMIN — Medication 81 MILLIGRAM(S): at 12:02

## 2017-04-04 RX ORDER — MIDODRINE HYDROCHLORIDE 2.5 MG/1
10 TABLET ORAL THREE TIMES A DAY
Qty: 0 | Refills: 0 | Status: DISCONTINUED | OUTPATIENT
Start: 2017-04-04 | End: 2017-04-05

## 2017-04-04 RX ADMIN — PANTOPRAZOLE SODIUM 40 MILLIGRAM(S): 20 TABLET, DELAYED RELEASE ORAL at 06:01

## 2017-04-04 RX ADMIN — Medication 650 MILLIGRAM(S): at 13:39

## 2017-04-04 RX ADMIN — Medication 100 MILLIGRAM(S): at 12:05

## 2017-04-04 RX ADMIN — Medication 1 DROP(S): at 17:13

## 2017-04-04 RX ADMIN — Medication 650 MILLIGRAM(S): at 14:15

## 2017-04-04 RX ADMIN — MIDODRINE HYDROCHLORIDE 10 MILLIGRAM(S): 2.5 TABLET ORAL at 13:38

## 2017-04-04 RX ADMIN — SENNA PLUS 2 TABLET(S): 8.6 TABLET ORAL at 21:07

## 2017-04-04 RX ADMIN — MIDODRINE HYDROCHLORIDE 5 MILLIGRAM(S): 2.5 TABLET ORAL at 05:52

## 2017-04-04 RX ADMIN — FINASTERIDE 5 MILLIGRAM(S): 5 TABLET, FILM COATED ORAL at 12:05

## 2017-04-04 RX ADMIN — Medication 25 MICROGRAM(S): at 05:52

## 2017-04-04 RX ADMIN — Medication 1 ENEMA: at 13:39

## 2017-04-04 RX ADMIN — Medication 1 DROP(S): at 13:38

## 2017-04-04 RX ADMIN — Medication 12.5 MILLIGRAM(S): at 05:52

## 2017-04-04 RX ADMIN — PYRIDOSTIGMINE BROMIDE 30 MILLIGRAM(S): 60 SOLUTION ORAL at 12:04

## 2017-04-04 RX ADMIN — Medication 81 MILLIGRAM(S): at 12:04

## 2017-04-04 RX ADMIN — Medication 1 DROP(S): at 09:47

## 2017-04-04 RX ADMIN — Medication 1 DROP(S): at 05:52

## 2017-04-04 RX ADMIN — Medication 1: at 09:42

## 2017-04-04 RX ADMIN — ATORVASTATIN CALCIUM 20 MILLIGRAM(S): 80 TABLET, FILM COATED ORAL at 21:07

## 2017-04-04 RX ADMIN — Medication 1 DROP(S): at 21:07

## 2017-04-04 RX ADMIN — Medication 12.5 MILLIGRAM(S): at 17:12

## 2017-04-04 RX ADMIN — MIDODRINE HYDROCHLORIDE 10 MILLIGRAM(S): 2.5 TABLET ORAL at 21:07

## 2017-04-05 VITALS
RESPIRATION RATE: 17 BRPM | TEMPERATURE: 98 F | DIASTOLIC BLOOD PRESSURE: 84 MMHG | HEART RATE: 83 BPM | SYSTOLIC BLOOD PRESSURE: 122 MMHG | OXYGEN SATURATION: 97 %

## 2017-04-05 LAB
ANION GAP SERPL CALC-SCNC: 8 MMOL/L — SIGNIFICANT CHANGE UP (ref 5–17)
BUN SERPL-MCNC: 24 MG/DL — HIGH (ref 7–18)
CALCIUM SERPL-MCNC: 9 MG/DL — SIGNIFICANT CHANGE UP (ref 8.4–10.5)
CHLORIDE SERPL-SCNC: 105 MMOL/L — SIGNIFICANT CHANGE UP (ref 96–108)
CO2 SERPL-SCNC: 27 MMOL/L — SIGNIFICANT CHANGE UP (ref 22–31)
CREAT SERPL-MCNC: 1.23 MG/DL — SIGNIFICANT CHANGE UP (ref 0.5–1.3)
GLUCOSE SERPL-MCNC: 95 MG/DL — SIGNIFICANT CHANGE UP (ref 70–99)
HCT VFR BLD CALC: 37.7 % — LOW (ref 39–50)
HGB BLD-MCNC: 12.7 G/DL — LOW (ref 13–17)
MCHC RBC-ENTMCNC: 31.6 PG — SIGNIFICANT CHANGE UP (ref 27–34)
MCHC RBC-ENTMCNC: 33.6 GM/DL — SIGNIFICANT CHANGE UP (ref 32–36)
MCV RBC AUTO: 94.1 FL — SIGNIFICANT CHANGE UP (ref 80–100)
PLATELET # BLD AUTO: 264 K/UL — SIGNIFICANT CHANGE UP (ref 150–400)
POTASSIUM SERPL-MCNC: 4.1 MMOL/L — SIGNIFICANT CHANGE UP (ref 3.5–5.3)
POTASSIUM SERPL-SCNC: 4.1 MMOL/L — SIGNIFICANT CHANGE UP (ref 3.5–5.3)
RBC # BLD: 4 M/UL — LOW (ref 4.2–5.8)
RBC # FLD: 11.6 % — SIGNIFICANT CHANGE UP (ref 10.3–14.5)
SODIUM SERPL-SCNC: 140 MMOL/L — SIGNIFICANT CHANGE UP (ref 135–145)
WBC # BLD: 11.1 K/UL — HIGH (ref 3.8–10.5)
WBC # FLD AUTO: 11.1 K/UL — HIGH (ref 3.8–10.5)

## 2017-04-05 PROCEDURE — 82746 ASSAY OF FOLIC ACID SERUM: CPT

## 2017-04-05 PROCEDURE — 72125 CT NECK SPINE W/O DYE: CPT

## 2017-04-05 PROCEDURE — 71045 X-RAY EXAM CHEST 1 VIEW: CPT

## 2017-04-05 PROCEDURE — C1785: CPT

## 2017-04-05 PROCEDURE — 93306 TTE W/DOPPLER COMPLETE: CPT

## 2017-04-05 PROCEDURE — 70551 MRI BRAIN STEM W/O DYE: CPT

## 2017-04-05 PROCEDURE — 80048 BASIC METABOLIC PNL TOTAL CA: CPT

## 2017-04-05 PROCEDURE — 85027 COMPLETE CBC AUTOMATED: CPT

## 2017-04-05 PROCEDURE — 97110 THERAPEUTIC EXERCISES: CPT

## 2017-04-05 PROCEDURE — 93017 CV STRESS TEST TRACING ONLY: CPT

## 2017-04-05 PROCEDURE — 80061 LIPID PANEL: CPT

## 2017-04-05 PROCEDURE — 81001 URINALYSIS AUTO W/SCOPE: CPT

## 2017-04-05 PROCEDURE — C1894: CPT

## 2017-04-05 PROCEDURE — 93005 ELECTROCARDIOGRAM TRACING: CPT

## 2017-04-05 PROCEDURE — 82550 ASSAY OF CK (CPK): CPT

## 2017-04-05 PROCEDURE — 71046 X-RAY EXAM CHEST 2 VIEWS: CPT

## 2017-04-05 PROCEDURE — 84484 ASSAY OF TROPONIN QUANT: CPT

## 2017-04-05 PROCEDURE — 82607 VITAMIN B-12: CPT

## 2017-04-05 PROCEDURE — A9502: CPT

## 2017-04-05 PROCEDURE — 76000 FLUOROSCOPY <1 HR PHYS/QHP: CPT

## 2017-04-05 PROCEDURE — 70450 CT HEAD/BRAIN W/O DYE: CPT

## 2017-04-05 PROCEDURE — 82553 CREATINE MB FRACTION: CPT

## 2017-04-05 PROCEDURE — 83735 ASSAY OF MAGNESIUM: CPT

## 2017-04-05 PROCEDURE — 97116 GAIT TRAINING THERAPY: CPT

## 2017-04-05 PROCEDURE — 83036 HEMOGLOBIN GLYCOSYLATED A1C: CPT

## 2017-04-05 PROCEDURE — 82306 VITAMIN D 25 HYDROXY: CPT

## 2017-04-05 PROCEDURE — 85730 THROMBOPLASTIN TIME PARTIAL: CPT

## 2017-04-05 PROCEDURE — 97162 PT EVAL MOD COMPLEX 30 MIN: CPT

## 2017-04-05 PROCEDURE — 80053 COMPREHEN METABOLIC PANEL: CPT

## 2017-04-05 PROCEDURE — C1898: CPT

## 2017-04-05 PROCEDURE — 99285 EMERGENCY DEPT VISIT HI MDM: CPT | Mod: 25

## 2017-04-05 PROCEDURE — 85610 PROTHROMBIN TIME: CPT

## 2017-04-05 PROCEDURE — 78452 HT MUSCLE IMAGE SPECT MULT: CPT

## 2017-04-05 PROCEDURE — 97530 THERAPEUTIC ACTIVITIES: CPT

## 2017-04-05 PROCEDURE — 84443 ASSAY THYROID STIM HORMONE: CPT

## 2017-04-05 PROCEDURE — 87086 URINE CULTURE/COLONY COUNT: CPT

## 2017-04-05 PROCEDURE — 84100 ASSAY OF PHOSPHORUS: CPT

## 2017-04-05 RX ORDER — SENNA PLUS 8.6 MG/1
2 TABLET ORAL
Qty: 0 | Refills: 0 | COMMUNITY
Start: 2017-04-05

## 2017-04-05 RX ORDER — MIDODRINE HYDROCHLORIDE 2.5 MG/1
0.5 TABLET ORAL
Qty: 0 | Refills: 0 | COMMUNITY
Start: 2017-04-05

## 2017-04-05 RX ORDER — MIDODRINE HYDROCHLORIDE 2.5 MG/1
1 TABLET ORAL
Qty: 0 | Refills: 0 | COMMUNITY
Start: 2017-04-05

## 2017-04-05 RX ADMIN — Medication 1 DROP(S): at 09:57

## 2017-04-05 RX ADMIN — Medication 1 DROP(S): at 01:44

## 2017-04-05 RX ADMIN — PANTOPRAZOLE SODIUM 40 MILLIGRAM(S): 20 TABLET, DELAYED RELEASE ORAL at 05:35

## 2017-04-05 RX ADMIN — Medication 25 MICROGRAM(S): at 05:34

## 2017-04-05 RX ADMIN — Medication 1 DROP(S): at 05:36

## 2017-04-05 RX ADMIN — Medication 100 MILLIGRAM(S): at 12:17

## 2017-04-05 RX ADMIN — MIDODRINE HYDROCHLORIDE 10 MILLIGRAM(S): 2.5 TABLET ORAL at 14:05

## 2017-04-05 RX ADMIN — Medication 81 MILLIGRAM(S): at 12:16

## 2017-04-05 RX ADMIN — MIDODRINE HYDROCHLORIDE 10 MILLIGRAM(S): 2.5 TABLET ORAL at 05:35

## 2017-04-05 RX ADMIN — Medication 1: at 12:15

## 2017-04-05 RX ADMIN — PYRIDOSTIGMINE BROMIDE 30 MILLIGRAM(S): 60 SOLUTION ORAL at 12:16

## 2017-04-05 RX ADMIN — Medication 12.5 MILLIGRAM(S): at 05:35

## 2017-04-05 RX ADMIN — FINASTERIDE 5 MILLIGRAM(S): 5 TABLET, FILM COATED ORAL at 12:16

## 2017-04-05 RX ADMIN — Medication 1 DROP(S): at 14:04

## 2017-04-10 DIAGNOSIS — G62.9 POLYNEUROPATHY, UNSPECIFIED: ICD-10-CM

## 2017-04-10 DIAGNOSIS — R55 SYNCOPE AND COLLAPSE: ICD-10-CM

## 2017-04-10 DIAGNOSIS — N17.9 ACUTE KIDNEY FAILURE, UNSPECIFIED: ICD-10-CM

## 2017-04-10 DIAGNOSIS — E03.9 HYPOTHYROIDISM, UNSPECIFIED: ICD-10-CM

## 2017-04-10 DIAGNOSIS — Z79.4 LONG TERM (CURRENT) USE OF INSULIN: ICD-10-CM

## 2017-04-10 DIAGNOSIS — E11.9 TYPE 2 DIABETES MELLITUS WITHOUT COMPLICATIONS: ICD-10-CM

## 2017-04-10 DIAGNOSIS — R62.7 ADULT FAILURE TO THRIVE: ICD-10-CM

## 2017-04-10 DIAGNOSIS — E43 UNSPECIFIED SEVERE PROTEIN-CALORIE MALNUTRITION: ICD-10-CM

## 2017-04-10 DIAGNOSIS — E86.0 DEHYDRATION: ICD-10-CM

## 2017-04-10 DIAGNOSIS — I44.1 ATRIOVENTRICULAR BLOCK, SECOND DEGREE: ICD-10-CM

## 2017-04-10 DIAGNOSIS — I10 ESSENTIAL (PRIMARY) HYPERTENSION: ICD-10-CM

## 2017-06-02 PROBLEM — E03.9 HYPOTHYROIDISM, UNSPECIFIED: Chronic | Status: ACTIVE | Noted: 2017-03-22

## 2017-06-02 PROBLEM — E11.9 TYPE 2 DIABETES MELLITUS WITHOUT COMPLICATIONS: Chronic | Status: ACTIVE | Noted: 2017-03-22

## 2017-06-08 PROBLEM — Z00.00 ENCOUNTER FOR PREVENTIVE HEALTH EXAMINATION: Status: ACTIVE | Noted: 2017-06-08

## 2017-06-23 ENCOUNTER — APPOINTMENT (OUTPATIENT)
Dept: INTERNAL MEDICINE | Facility: CLINIC | Age: 82
End: 2017-06-23

## 2017-06-23 VITALS
HEIGHT: 60 IN | OXYGEN SATURATION: 96 % | DIASTOLIC BLOOD PRESSURE: 60 MMHG | HEART RATE: 81 BPM | WEIGHT: 114 LBS | TEMPERATURE: 97.7 F | RESPIRATION RATE: 14 BRPM | BODY MASS INDEX: 22.38 KG/M2 | SYSTOLIC BLOOD PRESSURE: 97 MMHG

## 2017-06-23 DIAGNOSIS — F15.90 OTHER STIMULANT USE, UNSPECIFIED, UNCOMPLICATED: ICD-10-CM

## 2017-06-27 LAB
25(OH)D3 SERPL-MCNC: 76.8 NG/ML
ALBUMIN SERPL ELPH-MCNC: 4 G/DL
ALP BLD-CCNC: 87 U/L
ALT SERPL-CCNC: 11 U/L
ANION GAP SERPL CALC-SCNC: 16 MMOL/L
APPEARANCE: CLEAR
AST SERPL-CCNC: 19 U/L
BACTERIA: NEGATIVE
BASOPHILS # BLD AUTO: 0.03 K/UL
BASOPHILS NFR BLD AUTO: 0.4 %
BILIRUB SERPL-MCNC: 0.4 MG/DL
BILIRUBIN URINE: NEGATIVE
BLOOD URINE: NEGATIVE
BUN SERPL-MCNC: 27 MG/DL
CALCIUM SERPL-MCNC: 9.9 MG/DL
CHLORIDE SERPL-SCNC: 103 MMOL/L
CHOLEST SERPL-MCNC: 156 MG/DL
CO2 SERPL-SCNC: 23 MMOL/L
COLOR: YELLOW
CREAT SERPL-MCNC: 1.4 MG/DL
EOSINOPHIL # BLD AUTO: 0.14 K/UL
EOSINOPHIL NFR BLD AUTO: 2.1 %
GLUCOSE QUALITATIVE U: NORMAL MG/DL
GLUCOSE SERPL-MCNC: 80 MG/DL
HBA1C MFR BLD HPLC: 5.6 %
HCT VFR BLD CALC: 35.3 %
HDLC SERPL-MCNC: 61 MG/DL
HGB BLD-MCNC: 11.2 G/DL
HYALINE CASTS: 2 /LPF
IMM GRANULOCYTES NFR BLD AUTO: 0.1 %
KETONES URINE: NEGATIVE
LDLC SERPL DIRECT ASSAY-MCNC: 77 MG/DL
LEUKOCYTE ESTERASE URINE: NEGATIVE
LYMPHOCYTES # BLD AUTO: 1.96 K/UL
LYMPHOCYTES NFR BLD AUTO: 29 %
MAN DIFF?: NORMAL
MCHC RBC-ENTMCNC: 30.6 PG
MCHC RBC-ENTMCNC: 31.7 GM/DL
MCV RBC AUTO: 96.4 FL
MICROSCOPIC-UA: NORMAL
MONOCYTES # BLD AUTO: 0.43 K/UL
MONOCYTES NFR BLD AUTO: 6.4 %
NEUTROPHILS # BLD AUTO: 4.19 K/UL
NEUTROPHILS NFR BLD AUTO: 62 %
NITRITE URINE: NEGATIVE
PH URINE: 6
PLATELET # BLD AUTO: 223 K/UL
POTASSIUM SERPL-SCNC: 4.6 MMOL/L
PROT SERPL-MCNC: 6.7 G/DL
PROTEIN URINE: ABNORMAL MG/DL
RBC # BLD: 3.66 M/UL
RBC # FLD: 15.5 %
RED BLOOD CELLS URINE: 2 /HPF
SODIUM SERPL-SCNC: 142 MMOL/L
SPECIFIC GRAVITY URINE: 1.02
SQUAMOUS EPITHELIAL CELLS: 0 /HPF
TSH SERPL-ACNC: 2.64 UIU/ML
UROBILINOGEN URINE: 1 MG/DL
WBC # FLD AUTO: 6.76 K/UL
WHITE BLOOD CELLS URINE: 1 /HPF

## 2017-06-28 ENCOUNTER — OUTPATIENT (OUTPATIENT)
Dept: OUTPATIENT SERVICES | Facility: HOSPITAL | Age: 82
LOS: 1 days | End: 2017-06-28
Payer: COMMERCIAL

## 2017-06-28 ENCOUNTER — APPOINTMENT (OUTPATIENT)
Dept: CARDIOLOGY | Facility: CLINIC | Age: 82
End: 2017-06-28

## 2017-06-28 VITALS
BODY MASS INDEX: 22.38 KG/M2 | HEIGHT: 60 IN | SYSTOLIC BLOOD PRESSURE: 81 MMHG | OXYGEN SATURATION: 97 % | WEIGHT: 114 LBS | HEART RATE: 84 BPM | DIASTOLIC BLOOD PRESSURE: 49 MMHG | RESPIRATION RATE: 16 BRPM

## 2017-06-28 DIAGNOSIS — Z00.8 ENCOUNTER FOR OTHER GENERAL EXAMINATION: ICD-10-CM

## 2017-06-28 PROCEDURE — G0463: CPT | Mod: 25

## 2017-06-28 PROCEDURE — 93010 ELECTROCARDIOGRAM REPORT: CPT

## 2017-06-28 PROCEDURE — 93005 ELECTROCARDIOGRAM TRACING: CPT

## 2017-06-28 RX ORDER — LOSARTAN POTASSIUM 25 MG/1
25 TABLET, FILM COATED ORAL DAILY
Qty: 30 | Refills: 5 | Status: DISCONTINUED | COMMUNITY
Start: 2017-05-25 | End: 2017-06-28

## 2017-06-28 RX ORDER — METOPROLOL TARTRATE 25 MG/1
25 TABLET, FILM COATED ORAL TWICE DAILY
Refills: 0 | Status: DISCONTINUED | COMMUNITY
End: 2017-06-28

## 2017-06-29 DIAGNOSIS — E78.5 HYPERLIPIDEMIA, UNSPECIFIED: ICD-10-CM

## 2017-06-29 DIAGNOSIS — L29.9 PRURITUS, UNSPECIFIED: ICD-10-CM

## 2017-06-29 DIAGNOSIS — R42 DIZZINESS AND GIDDINESS: ICD-10-CM

## 2017-06-30 ENCOUNTER — APPOINTMENT (OUTPATIENT)
Dept: INTERNAL MEDICINE | Facility: CLINIC | Age: 82
End: 2017-06-30

## 2017-06-30 VITALS
OXYGEN SATURATION: 98 % | SYSTOLIC BLOOD PRESSURE: 90 MMHG | BODY MASS INDEX: 22.78 KG/M2 | RESPIRATION RATE: 16 BRPM | DIASTOLIC BLOOD PRESSURE: 60 MMHG | HEIGHT: 60 IN | HEART RATE: 90 BPM | TEMPERATURE: 97.4 F | WEIGHT: 116 LBS

## 2017-06-30 DIAGNOSIS — Z87.898 PERSONAL HISTORY OF OTHER SPECIFIED CONDITIONS: ICD-10-CM

## 2017-06-30 RX ORDER — MIDODRINE HYDROCHLORIDE 10 MG/1
10 TABLET ORAL 3 TIMES DAILY
Refills: 0 | Status: COMPLETED | COMMUNITY
Start: 2017-05-25 | End: 2017-06-30

## 2017-06-30 RX ORDER — PYRIDOSTIGMINE BROMIDE 60 MG/1
60 TABLET ORAL
Refills: 0 | Status: COMPLETED | COMMUNITY
End: 2017-06-30

## 2017-07-28 ENCOUNTER — APPOINTMENT (OUTPATIENT)
Dept: INTERNAL MEDICINE | Facility: CLINIC | Age: 82
End: 2017-07-28
Payer: MEDICARE

## 2017-07-28 VITALS
HEART RATE: 74 BPM | OXYGEN SATURATION: 98 % | DIASTOLIC BLOOD PRESSURE: 60 MMHG | WEIGHT: 116 LBS | HEIGHT: 60 IN | BODY MASS INDEX: 22.78 KG/M2 | SYSTOLIC BLOOD PRESSURE: 92 MMHG | TEMPERATURE: 97.4 F | RESPIRATION RATE: 15 BRPM

## 2017-07-28 DIAGNOSIS — Z87.2 PERSONAL HISTORY OF DISEASES OF THE SKIN AND SUBCUTANEOUS TISSUE: ICD-10-CM

## 2017-07-28 PROCEDURE — 99214 OFFICE O/P EST MOD 30 MIN: CPT

## 2017-07-31 ENCOUNTER — CHART COPY (OUTPATIENT)
Age: 82
End: 2017-07-31

## 2017-07-31 LAB
ANION GAP SERPL CALC-SCNC: 15 MMOL/L
BASOPHILS # BLD AUTO: 0.03 K/UL
BASOPHILS NFR BLD AUTO: 0.5 %
BUN SERPL-MCNC: 41 MG/DL
CALCIUM SERPL-MCNC: 9.8 MG/DL
CHLORIDE SERPL-SCNC: 105 MMOL/L
CO2 SERPL-SCNC: 22 MMOL/L
CREAT SERPL-MCNC: 1.39 MG/DL
EOSINOPHIL # BLD AUTO: 0.15 K/UL
EOSINOPHIL NFR BLD AUTO: 2.6 %
FERRITIN SERPL-MCNC: 68 NG/ML
FOLATE SERPL-MCNC: 9.3 NG/ML
GLUCOSE SERPL-MCNC: 89 MG/DL
HCT VFR BLD CALC: 34.1 %
HGB BLD-MCNC: 11.1 G/DL
IMM GRANULOCYTES NFR BLD AUTO: 0.2 %
IRON SERPL-MCNC: 71 UG/DL
LYMPHOCYTES # BLD AUTO: 1.59 K/UL
LYMPHOCYTES NFR BLD AUTO: 27.3 %
MAN DIFF?: NORMAL
MCHC RBC-ENTMCNC: 31.2 PG
MCHC RBC-ENTMCNC: 32.6 GM/DL
MCV RBC AUTO: 95.8 FL
MONOCYTES # BLD AUTO: 0.55 K/UL
MONOCYTES NFR BLD AUTO: 9.5 %
NEUTROPHILS # BLD AUTO: 3.49 K/UL
NEUTROPHILS NFR BLD AUTO: 59.9 %
PLATELET # BLD AUTO: 255 K/UL
POTASSIUM SERPL-SCNC: 4.8 MMOL/L
RBC # BLD: 3.56 M/UL
RBC # FLD: 14.4 %
SODIUM SERPL-SCNC: 142 MMOL/L
VIT B12 SERPL-MCNC: 376 PG/ML
WBC # FLD AUTO: 5.82 K/UL

## 2017-08-01 LAB
CREAT SERPL-MCNC: 1.43 MG/DL
SODIUM SERPL-SCNC: 144 MMOL/L

## 2017-08-02 LAB
CREAT SPEC-SCNC: 201 MG/DL
SODIUM ?TM SUB UR QN: 21 MMOL/L

## 2017-08-09 ENCOUNTER — OUTPATIENT (OUTPATIENT)
Dept: OUTPATIENT SERVICES | Facility: HOSPITAL | Age: 82
LOS: 1 days | End: 2017-08-09
Payer: COMMERCIAL

## 2017-08-09 DIAGNOSIS — M54.5 LOW BACK PAIN: ICD-10-CM

## 2017-08-09 PROCEDURE — 72110 X-RAY EXAM L-2 SPINE 4/>VWS: CPT | Mod: 26

## 2017-08-09 PROCEDURE — 72110 X-RAY EXAM L-2 SPINE 4/>VWS: CPT

## 2017-09-27 ENCOUNTER — OUTPATIENT (OUTPATIENT)
Dept: OUTPATIENT SERVICES | Facility: HOSPITAL | Age: 82
LOS: 1 days | End: 2017-09-27
Payer: COMMERCIAL

## 2017-09-27 ENCOUNTER — APPOINTMENT (OUTPATIENT)
Dept: CARDIOLOGY | Facility: CLINIC | Age: 82
End: 2017-09-27
Payer: MEDICARE

## 2017-09-27 VITALS
WEIGHT: 116 LBS | DIASTOLIC BLOOD PRESSURE: 68 MMHG | SYSTOLIC BLOOD PRESSURE: 115 MMHG | HEART RATE: 82 BPM | BODY MASS INDEX: 22.78 KG/M2 | HEIGHT: 60 IN | OXYGEN SATURATION: 97 % | RESPIRATION RATE: 15 BRPM

## 2017-09-27 DIAGNOSIS — Z00.8 ENCOUNTER FOR OTHER GENERAL EXAMINATION: ICD-10-CM

## 2017-09-27 PROCEDURE — 93005 ELECTROCARDIOGRAM TRACING: CPT

## 2017-09-27 PROCEDURE — G0463: CPT | Mod: 25

## 2017-09-27 PROCEDURE — 93010 ELECTROCARDIOGRAM REPORT: CPT

## 2017-09-27 PROCEDURE — ZZZZZ: CPT

## 2017-09-27 RX ORDER — DOCUSATE SODIUM 100 MG/1
100 CAPSULE ORAL
Qty: 180 | Refills: 3 | Status: ACTIVE | COMMUNITY
Start: 2017-06-23 | End: 1900-01-01

## 2017-09-28 DIAGNOSIS — R42 DIZZINESS AND GIDDINESS: ICD-10-CM

## 2017-09-28 DIAGNOSIS — E78.5 HYPERLIPIDEMIA, UNSPECIFIED: ICD-10-CM

## 2017-10-26 ENCOUNTER — LABORATORY RESULT (OUTPATIENT)
Age: 82
End: 2017-10-26

## 2017-10-26 ENCOUNTER — APPOINTMENT (OUTPATIENT)
Dept: INTERNAL MEDICINE | Facility: CLINIC | Age: 82
End: 2017-10-26
Payer: MEDICARE

## 2017-10-26 VITALS
BODY MASS INDEX: 23.95 KG/M2 | OXYGEN SATURATION: 99 % | SYSTOLIC BLOOD PRESSURE: 95 MMHG | WEIGHT: 122 LBS | HEART RATE: 77 BPM | HEIGHT: 60 IN | DIASTOLIC BLOOD PRESSURE: 60 MMHG | RESPIRATION RATE: 15 BRPM | TEMPERATURE: 97.5 F

## 2017-10-26 DIAGNOSIS — M54.5 LOW BACK PAIN: ICD-10-CM

## 2017-10-26 DIAGNOSIS — G89.29 LOW BACK PAIN: ICD-10-CM

## 2017-10-26 PROCEDURE — 99214 OFFICE O/P EST MOD 30 MIN: CPT

## 2017-10-26 RX ORDER — LEVOTHYROXINE SODIUM 0.03 MG/1
25 TABLET ORAL DAILY
Qty: 90 | Refills: 3 | Status: COMPLETED | COMMUNITY
End: 2017-10-26

## 2017-10-30 LAB — TSH SERPL-ACNC: 6.48 UIU/ML

## 2017-10-31 ENCOUNTER — APPOINTMENT (OUTPATIENT)
Dept: UROLOGY | Facility: CLINIC | Age: 82
End: 2017-10-31
Payer: MEDICARE

## 2017-10-31 VITALS
SYSTOLIC BLOOD PRESSURE: 100 MMHG | HEIGHT: 60 IN | TEMPERATURE: 97.6 F | BODY MASS INDEX: 23.36 KG/M2 | DIASTOLIC BLOOD PRESSURE: 60 MMHG | HEART RATE: 83 BPM | WEIGHT: 119 LBS | RESPIRATION RATE: 15 BRPM

## 2017-10-31 LAB
APPEARANCE: CLEAR
BILIRUBIN URINE: NEGATIVE
BLOOD URINE: NEGATIVE
COLOR: YELLOW
GLUCOSE QUALITATIVE U: NEGATIVE MG/DL
KETONES URINE: NEGATIVE
LEUKOCYTE ESTERASE URINE: NEGATIVE
NITRITE URINE: NEGATIVE
PH URINE: 5
PROTEIN URINE: NEGATIVE MG/DL
SPECIFIC GRAVITY URINE: 1.02
UROBILINOGEN URINE: NEGATIVE MG/DL

## 2017-10-31 PROCEDURE — 51798 US URINE CAPACITY MEASURE: CPT

## 2017-10-31 PROCEDURE — 99204 OFFICE O/P NEW MOD 45 MIN: CPT

## 2017-11-01 LAB — BACTERIA UR CULT: NORMAL

## 2017-12-05 ENCOUNTER — APPOINTMENT (OUTPATIENT)
Dept: UROLOGY | Facility: CLINIC | Age: 82
End: 2017-12-05
Payer: MEDICARE

## 2017-12-05 VITALS
HEIGHT: 60 IN | RESPIRATION RATE: 15 BRPM | WEIGHT: 119 LBS | DIASTOLIC BLOOD PRESSURE: 64 MMHG | SYSTOLIC BLOOD PRESSURE: 82 MMHG | BODY MASS INDEX: 23.36 KG/M2 | TEMPERATURE: 97.6 F | OXYGEN SATURATION: 98 % | HEART RATE: 85 BPM

## 2017-12-05 PROCEDURE — 99213 OFFICE O/P EST LOW 20 MIN: CPT

## 2017-12-05 PROCEDURE — 51736 URINE FLOW MEASUREMENT: CPT

## 2017-12-27 ENCOUNTER — APPOINTMENT (OUTPATIENT)
Dept: CARDIOLOGY | Facility: CLINIC | Age: 82
End: 2017-12-27
Payer: MEDICARE

## 2017-12-27 ENCOUNTER — OUTPATIENT (OUTPATIENT)
Dept: OUTPATIENT SERVICES | Facility: HOSPITAL | Age: 82
LOS: 1 days | End: 2017-12-27
Payer: COMMERCIAL

## 2017-12-27 VITALS
OXYGEN SATURATION: 97 % | BODY MASS INDEX: 23.75 KG/M2 | HEIGHT: 60 IN | RESPIRATION RATE: 16 BRPM | SYSTOLIC BLOOD PRESSURE: 92 MMHG | DIASTOLIC BLOOD PRESSURE: 60 MMHG | WEIGHT: 121 LBS | HEART RATE: 83 BPM

## 2017-12-27 DIAGNOSIS — Z00.8 ENCOUNTER FOR OTHER GENERAL EXAMINATION: ICD-10-CM

## 2017-12-27 PROCEDURE — 93005 ELECTROCARDIOGRAM TRACING: CPT

## 2017-12-27 PROCEDURE — 93010 ELECTROCARDIOGRAM REPORT: CPT

## 2017-12-27 PROCEDURE — G0463: CPT | Mod: 25

## 2017-12-27 PROCEDURE — ZZZZZ: CPT

## 2017-12-28 DIAGNOSIS — E78.5 HYPERLIPIDEMIA, UNSPECIFIED: ICD-10-CM

## 2017-12-28 DIAGNOSIS — R42 DIZZINESS AND GIDDINESS: ICD-10-CM

## 2018-01-02 ENCOUNTER — RX RENEWAL (OUTPATIENT)
Age: 83
End: 2018-01-02

## 2018-01-02 ENCOUNTER — APPOINTMENT (OUTPATIENT)
Dept: UROLOGY | Facility: CLINIC | Age: 83
End: 2018-01-02
Payer: MEDICARE

## 2018-01-02 VITALS
HEART RATE: 87 BPM | OXYGEN SATURATION: 94 % | DIASTOLIC BLOOD PRESSURE: 60 MMHG | RESPIRATION RATE: 16 BRPM | TEMPERATURE: 97 F | SYSTOLIC BLOOD PRESSURE: 88 MMHG | HEIGHT: 60 IN

## 2018-01-02 PROCEDURE — 51736 URINE FLOW MEASUREMENT: CPT

## 2018-01-02 PROCEDURE — 99213 OFFICE O/P EST LOW 20 MIN: CPT

## 2018-02-13 ENCOUNTER — APPOINTMENT (OUTPATIENT)
Dept: UROLOGY | Facility: CLINIC | Age: 83
End: 2018-02-13
Payer: MEDICARE

## 2018-02-13 VITALS — RESPIRATION RATE: 16 BRPM | HEART RATE: 89 BPM | SYSTOLIC BLOOD PRESSURE: 70 MMHG | DIASTOLIC BLOOD PRESSURE: 58 MMHG

## 2018-02-13 VITALS
RESPIRATION RATE: 16 BRPM | TEMPERATURE: 97.6 F | DIASTOLIC BLOOD PRESSURE: 40 MMHG | SYSTOLIC BLOOD PRESSURE: 60 MMHG | OXYGEN SATURATION: 98 % | HEART RATE: 89 BPM

## 2018-02-13 VITALS — RESPIRATION RATE: 16 BRPM | DIASTOLIC BLOOD PRESSURE: 48 MMHG | HEART RATE: 88 BPM | SYSTOLIC BLOOD PRESSURE: 70 MMHG

## 2018-02-13 DIAGNOSIS — M79.1 MYALGIA: ICD-10-CM

## 2018-02-13 PROCEDURE — 99213 OFFICE O/P EST LOW 20 MIN: CPT

## 2018-02-22 ENCOUNTER — APPOINTMENT (OUTPATIENT)
Dept: INTERNAL MEDICINE | Facility: CLINIC | Age: 83
End: 2018-02-22
Payer: MEDICARE

## 2018-02-22 ENCOUNTER — LABORATORY RESULT (OUTPATIENT)
Age: 83
End: 2018-02-22

## 2018-02-22 VITALS
HEART RATE: 85 BPM | BODY MASS INDEX: 24.35 KG/M2 | OXYGEN SATURATION: 99 % | DIASTOLIC BLOOD PRESSURE: 50 MMHG | RESPIRATION RATE: 16 BRPM | HEIGHT: 60 IN | TEMPERATURE: 98.1 F | WEIGHT: 124 LBS | SYSTOLIC BLOOD PRESSURE: 70 MMHG

## 2018-02-22 DIAGNOSIS — D64.9 ANEMIA, UNSPECIFIED: ICD-10-CM

## 2018-02-22 PROCEDURE — 99214 OFFICE O/P EST MOD 30 MIN: CPT

## 2018-02-22 RX ORDER — LIDOCAINE AND PRILOCAINE 25; 25 MG/G; MG/G
2.5-2.5 CREAM TOPICAL
Qty: 1 | Refills: 3 | Status: DISCONTINUED | COMMUNITY
Start: 2017-06-28 | End: 2018-02-22

## 2018-02-22 RX ORDER — CHOLECALCIFEROL (VITAMIN D3) 1250 MCG
1.25 MG CAPSULE ORAL
Qty: 24 | Refills: 0 | Status: DISCONTINUED | COMMUNITY
Start: 2017-10-26 | End: 2018-02-22

## 2018-02-22 RX ORDER — TAMSULOSIN HYDROCHLORIDE 0.4 MG/1
0.4 CAPSULE ORAL
Qty: 30 | Refills: 5 | Status: DISCONTINUED | COMMUNITY
Start: 2017-10-31 | End: 2018-02-22

## 2018-02-22 RX ORDER — TADALAFIL 5 MG/1
5 TABLET, FILM COATED ORAL
Qty: 30 | Refills: 5 | Status: DISCONTINUED | COMMUNITY
Start: 2018-01-02 | End: 2018-02-22

## 2018-02-25 ENCOUNTER — RESULT REVIEW (OUTPATIENT)
Age: 83
End: 2018-02-25

## 2018-02-25 LAB
25(OH)D3 SERPL-MCNC: 54.2 NG/ML
ALBUMIN SERPL ELPH-MCNC: 3.9 G/DL
ALP BLD-CCNC: 93 U/L
ALT SERPL-CCNC: 10 U/L
ANION GAP SERPL CALC-SCNC: 14 MMOL/L
AST SERPL-CCNC: 23 U/L
BASOPHILS # BLD AUTO: 0.03 K/UL
BASOPHILS NFR BLD AUTO: 0.4 %
BILIRUB SERPL-MCNC: 0.4 MG/DL
BUN SERPL-MCNC: 38 MG/DL
CALCIUM SERPL-MCNC: 9.6 MG/DL
CHLORIDE SERPL-SCNC: 102 MMOL/L
CHOLEST SERPL-MCNC: 135 MG/DL
CHOLEST/HDLC SERPL: 2.2 RATIO
CO2 SERPL-SCNC: 23 MMOL/L
CREAT SERPL-MCNC: 1.89 MG/DL
EOSINOPHIL # BLD AUTO: 0.12 K/UL
EOSINOPHIL NFR BLD AUTO: 1.8 %
FERRITIN SERPL-MCNC: 75 NG/ML
FOLATE SERPL-MCNC: 14.1 NG/ML
GLUCOSE SERPL-MCNC: 91 MG/DL
HCT VFR BLD CALC: 34.3 %
HDLC SERPL-MCNC: 62 MG/DL
HGB BLD-MCNC: 11 G/DL
IMM GRANULOCYTES NFR BLD AUTO: 0.1 %
LDLC SERPL CALC-MCNC: 58 MG/DL
LYMPHOCYTES # BLD AUTO: 1.56 K/UL
LYMPHOCYTES NFR BLD AUTO: 23.1 %
MAN DIFF?: NORMAL
MCHC RBC-ENTMCNC: 30.8 PG
MCHC RBC-ENTMCNC: 32.1 GM/DL
MCV RBC AUTO: 96.1 FL
MONOCYTES # BLD AUTO: 0.48 K/UL
MONOCYTES NFR BLD AUTO: 7.1 %
NEUTROPHILS # BLD AUTO: 4.54 K/UL
NEUTROPHILS NFR BLD AUTO: 67.5 %
PLATELET # BLD AUTO: 202 K/UL
POTASSIUM SERPL-SCNC: 5 MMOL/L
PROT SERPL-MCNC: 6.5 G/DL
RBC # BLD: 3.57 M/UL
RBC # FLD: 14.5 %
SODIUM SERPL-SCNC: 139 MMOL/L
TRIGL SERPL-MCNC: 74 MG/DL
TSH SERPL-ACNC: 3.65 UIU/ML
VIT B12 SERPL-MCNC: 473 PG/ML
WBC # FLD AUTO: 6.74 K/UL

## 2018-03-26 ENCOUNTER — APPOINTMENT (OUTPATIENT)
Dept: INTERNAL MEDICINE | Facility: CLINIC | Age: 83
End: 2018-03-26
Payer: MEDICARE

## 2018-03-26 VITALS
OXYGEN SATURATION: 97 % | HEIGHT: 60 IN | HEART RATE: 74 BPM | SYSTOLIC BLOOD PRESSURE: 76 MMHG | TEMPERATURE: 98 F | RESPIRATION RATE: 16 BRPM | WEIGHT: 130 LBS | BODY MASS INDEX: 25.52 KG/M2 | DIASTOLIC BLOOD PRESSURE: 50 MMHG

## 2018-03-26 DIAGNOSIS — E55.9 VITAMIN D DEFICIENCY, UNSPECIFIED: ICD-10-CM

## 2018-03-26 DIAGNOSIS — R26.2 DIFFICULTY IN WALKING, NOT ELSEWHERE CLASSIFIED: ICD-10-CM

## 2018-03-26 DIAGNOSIS — Z23 ENCOUNTER FOR IMMUNIZATION: ICD-10-CM

## 2018-03-26 PROCEDURE — 99214 OFFICE O/P EST MOD 30 MIN: CPT

## 2018-03-27 LAB
ALBUMIN SERPL ELPH-MCNC: 4.1 G/DL
ALP BLD-CCNC: 84 U/L
ALT SERPL-CCNC: 11 U/L
ANION GAP SERPL CALC-SCNC: 15 MMOL/L
AST SERPL-CCNC: 23 U/L
BILIRUB SERPL-MCNC: 0.5 MG/DL
BUN SERPL-MCNC: 34 MG/DL
CALCIUM SERPL-MCNC: 10 MG/DL
CHLORIDE SERPL-SCNC: 101 MMOL/L
CO2 SERPL-SCNC: 23 MMOL/L
CREAT SERPL-MCNC: 1.7 MG/DL
GLUCOSE SERPL-MCNC: 90 MG/DL
POTASSIUM SERPL-SCNC: 4.7 MMOL/L
PROT SERPL-MCNC: 7.2 G/DL
SODIUM SERPL-SCNC: 139 MMOL/L

## 2018-03-28 ENCOUNTER — APPOINTMENT (OUTPATIENT)
Dept: CARDIOLOGY | Facility: CLINIC | Age: 83
End: 2018-03-28
Payer: MEDICARE

## 2018-04-11 ENCOUNTER — APPOINTMENT (OUTPATIENT)
Dept: CARDIOLOGY | Facility: CLINIC | Age: 83
End: 2018-04-11
Payer: MEDICARE

## 2018-04-11 ENCOUNTER — APPOINTMENT (OUTPATIENT)
Dept: CARDIOLOGY | Facility: CLINIC | Age: 83
End: 2018-04-11

## 2018-04-11 ENCOUNTER — OUTPATIENT (OUTPATIENT)
Dept: OUTPATIENT SERVICES | Facility: HOSPITAL | Age: 83
LOS: 1 days | End: 2018-04-11
Payer: COMMERCIAL

## 2018-04-11 VITALS
HEART RATE: 82 BPM | OXYGEN SATURATION: 97 % | DIASTOLIC BLOOD PRESSURE: 52 MMHG | BODY MASS INDEX: 25.52 KG/M2 | SYSTOLIC BLOOD PRESSURE: 78 MMHG | WEIGHT: 130 LBS | HEIGHT: 60 IN | RESPIRATION RATE: 16 BRPM

## 2018-04-11 DIAGNOSIS — Z00.8 ENCOUNTER FOR OTHER GENERAL EXAMINATION: ICD-10-CM

## 2018-04-11 PROCEDURE — 93880 EXTRACRANIAL BILAT STUDY: CPT | Mod: 26

## 2018-04-11 PROCEDURE — 93306 TTE W/DOPPLER COMPLETE: CPT | Mod: 26

## 2018-04-11 PROCEDURE — ZZZZZ: CPT

## 2018-04-11 PROCEDURE — 93005 ELECTROCARDIOGRAM TRACING: CPT

## 2018-04-11 PROCEDURE — G0463: CPT | Mod: 25

## 2018-04-11 PROCEDURE — 93306 TTE W/DOPPLER COMPLETE: CPT

## 2018-04-11 PROCEDURE — 93010 ELECTROCARDIOGRAM REPORT: CPT

## 2018-04-11 PROCEDURE — 93880 EXTRACRANIAL BILAT STUDY: CPT

## 2018-04-12 ENCOUNTER — RX RENEWAL (OUTPATIENT)
Age: 83
End: 2018-04-12

## 2018-04-16 ENCOUNTER — MEDICATION RENEWAL (OUTPATIENT)
Age: 83
End: 2018-04-16

## 2018-04-17 DIAGNOSIS — E78.5 HYPERLIPIDEMIA, UNSPECIFIED: ICD-10-CM

## 2018-04-17 DIAGNOSIS — R42 DIZZINESS AND GIDDINESS: ICD-10-CM

## 2018-04-17 DIAGNOSIS — R55 SYNCOPE AND COLLAPSE: ICD-10-CM

## 2018-04-17 DIAGNOSIS — I95.9 HYPOTENSION, UNSPECIFIED: ICD-10-CM

## 2018-04-23 ENCOUNTER — APPOINTMENT (OUTPATIENT)
Dept: INTERNAL MEDICINE | Facility: CLINIC | Age: 83
End: 2018-04-23
Payer: MEDICARE

## 2018-04-23 VITALS
OXYGEN SATURATION: 98 % | TEMPERATURE: 97.6 F | HEART RATE: 77 BPM | BODY MASS INDEX: 24.35 KG/M2 | RESPIRATION RATE: 16 BRPM | WEIGHT: 124 LBS | SYSTOLIC BLOOD PRESSURE: 97 MMHG | HEIGHT: 60 IN | DIASTOLIC BLOOD PRESSURE: 64 MMHG

## 2018-04-23 PROCEDURE — 99213 OFFICE O/P EST LOW 20 MIN: CPT

## 2018-04-24 ENCOUNTER — MOBILE ON CALL (OUTPATIENT)
Age: 83
End: 2018-04-24

## 2018-04-25 DIAGNOSIS — N28.1 CYST OF KIDNEY, ACQUIRED: ICD-10-CM

## 2018-04-25 DIAGNOSIS — N28.9 DISORDER OF KIDNEY AND URETER, UNSPECIFIED: ICD-10-CM

## 2018-05-31 ENCOUNTER — APPOINTMENT (OUTPATIENT)
Dept: NEUROLOGY | Facility: CLINIC | Age: 83
End: 2018-05-31
Payer: MEDICARE

## 2018-05-31 ENCOUNTER — RX RENEWAL (OUTPATIENT)
Age: 83
End: 2018-05-31

## 2018-05-31 VITALS
SYSTOLIC BLOOD PRESSURE: 80 MMHG | WEIGHT: 126 LBS | TEMPERATURE: 98 F | HEIGHT: 60 IN | BODY MASS INDEX: 24.74 KG/M2 | HEART RATE: 81 BPM | DIASTOLIC BLOOD PRESSURE: 56 MMHG | OXYGEN SATURATION: 91 %

## 2018-05-31 DIAGNOSIS — G47.9 SLEEP DISORDER, UNSPECIFIED: ICD-10-CM

## 2018-05-31 DIAGNOSIS — G47.19 OTHER HYPERSOMNIA: ICD-10-CM

## 2018-05-31 PROCEDURE — 99204 OFFICE O/P NEW MOD 45 MIN: CPT

## 2018-06-20 ENCOUNTER — APPOINTMENT (OUTPATIENT)
Dept: INTERNAL MEDICINE | Facility: CLINIC | Age: 83
End: 2018-06-20
Payer: MEDICARE

## 2018-06-20 VITALS
HEART RATE: 103 BPM | OXYGEN SATURATION: 97 % | WEIGHT: 124 LBS | TEMPERATURE: 97.6 F | DIASTOLIC BLOOD PRESSURE: 49 MMHG | SYSTOLIC BLOOD PRESSURE: 70 MMHG | RESPIRATION RATE: 16 BRPM | HEIGHT: 60 IN | BODY MASS INDEX: 24.35 KG/M2

## 2018-06-20 LAB
BASOPHILS # BLD AUTO: 0.03 K/UL
BASOPHILS NFR BLD AUTO: 0.4 %
EOSINOPHIL # BLD AUTO: 0.19 K/UL
EOSINOPHIL NFR BLD AUTO: 2.3 %
HCT VFR BLD CALC: 35.6 %
HGB BLD-MCNC: 11.4 G/DL
IMM GRANULOCYTES NFR BLD AUTO: 0.2 %
LYMPHOCYTES # BLD AUTO: 1.92 K/UL
LYMPHOCYTES NFR BLD AUTO: 23.4 %
MAN DIFF?: NORMAL
MCHC RBC-ENTMCNC: 31.1 PG
MCHC RBC-ENTMCNC: 32 GM/DL
MCV RBC AUTO: 97 FL
MONOCYTES # BLD AUTO: 0.73 K/UL
MONOCYTES NFR BLD AUTO: 8.9 %
NEUTROPHILS # BLD AUTO: 5.31 K/UL
NEUTROPHILS NFR BLD AUTO: 64.8 %
PLATELET # BLD AUTO: 201 K/UL
RBC # BLD: 3.67 M/UL
RBC # FLD: 13.8 %
WBC # FLD AUTO: 8.2 K/UL

## 2018-06-20 PROCEDURE — 99214 OFFICE O/P EST MOD 30 MIN: CPT

## 2018-06-20 RX ORDER — LEVOTHYROXINE SODIUM 0.03 MG/1
25 TABLET ORAL DAILY
Qty: 30 | Refills: 5 | Status: ACTIVE | COMMUNITY
Start: 2017-12-27 | End: 1900-01-01

## 2018-06-21 LAB — TSH SERPL-ACNC: 3.89 UIU/ML

## 2018-06-21 NOTE — REVIEW OF SYSTEMS
[Headache] : no headache [Fainting] : no fainting [Unsteady Walk] : ataxia [Memory Loss] : memory loss [Negative] : Heme/Lymph [de-identified] : ambulates with cane

## 2018-06-21 NOTE — HISTORY OF PRESENT ILLNESS
[Spouse] : spouse [de-identified] : GAYLA MARIANO is a 88 year old man with history of HLD, HTN, BPH w/urinary obstruction, Pacemaker placement, DJD (lumbar spine), dizziness presents for medical follow-up. His wife accompanies him today.  \par \par Patient reports compliance with taking his medications.  He continues to take Midodrine to 5 mg TID given low BP at last Cardiology office visit. Feels well today - no episodes of dizziness, appetite is good, moving his bowels regularly denies CP, SOB, palpitations. \par \par History: Admitted to [facility] 3/22/17 - 4/5/17 for dizziness and syncope secondary to autonomic dysfunction and Type 2 heart block (indication for PPM placement). During his hospital stay. Neurology was consulted for autonomic dysfunction and recommended pyridostigmine and Midodrine.  \par \par Since his last visit 4/23/18 patient has seen Neurology on 5/31/18 who evaluated him for hypersomnia.

## 2018-06-21 NOTE — PHYSICAL EXAM
[No Acute Distress] : no acute distress [Well Developed] : well developed [Normal Sclera/Conjunctiva] : normal sclera/conjunctiva [Normal Outer Ear/Nose] : the outer ears and nose were normal in appearance [Supple] : supple [No Lymphadenopathy] : no lymphadenopathy [Clear to Auscultation] : lungs were clear to auscultation bilaterally [Normal Rate] : normal rate  [Regular Rhythm] : with a regular rhythm [Normal S1, S2] : normal S1 and S2 [No Edema] : there was no peripheral edema [Soft] : abdomen soft [Non Tender] : non-tender [Non-distended] : non-distended [Normal Bowel Sounds] : normal bowel sounds [Normal Posterior Cervical Nodes] : no posterior cervical lymphadenopathy [Normal Anterior Cervical Nodes] : no anterior cervical lymphadenopathy [No Joint Swelling] : no joint swelling [Grossly Normal Strength/Tone] : grossly normal strength/tone [No Focal Deficits] : no focal deficits [Normal Affect] : the affect was normal [Normal Mood] : the mood was normal [de-identified] : frail-appearing [de-identified] : ambulates with a cane, requires assistance with transfers

## 2018-06-21 NOTE — ASSESSMENT
[FreeTextEntry1] : PLAN\par 88 year old man with history of  HLD, HTN, BPH w/urinary obstruction, Pacemaker placement, DJD (lumbar spine), dizziness stable on the current regimen, diet and lifestyle modifications as counseled.BP increased mildly when rechecked, patient stable.  Continue f/u with Neurology for autonomic dysfunction evaluation.  Referral to f/u with Nephrologiy for CKD.  The plan as ordered.\par

## 2018-06-22 ENCOUNTER — APPOINTMENT (OUTPATIENT)
Dept: UROLOGY | Facility: CLINIC | Age: 83
End: 2018-06-22

## 2018-07-27 PROBLEM — I10 ESSENTIAL (PRIMARY) HYPERTENSION: Chronic | Status: ACTIVE | Noted: 2017-03-28

## 2018-08-01 ENCOUNTER — MEDICATION RENEWAL (OUTPATIENT)
Age: 83
End: 2018-08-01

## 2018-08-14 ENCOUNTER — INPATIENT (INPATIENT)
Facility: HOSPITAL | Age: 83
LOS: 6 days | Discharge: HOME | DRG: 92 | End: 2018-08-21
Attending: HOSPITALIST | Admitting: HOSPITALIST
Payer: COMMERCIAL

## 2018-08-14 VITALS
HEART RATE: 96 BPM | RESPIRATION RATE: 16 BRPM | OXYGEN SATURATION: 100 % | HEIGHT: 68 IN | DIASTOLIC BLOOD PRESSURE: 61 MMHG | SYSTOLIC BLOOD PRESSURE: 87 MMHG | WEIGHT: 139.99 LBS | TEMPERATURE: 98 F

## 2018-08-14 DIAGNOSIS — R29.6 REPEATED FALLS: ICD-10-CM

## 2018-08-14 DIAGNOSIS — I48.91 UNSPECIFIED ATRIAL FIBRILLATION: ICD-10-CM

## 2018-08-14 DIAGNOSIS — Z29.9 ENCOUNTER FOR PROPHYLACTIC MEASURES, UNSPECIFIED: ICD-10-CM

## 2018-08-14 DIAGNOSIS — R53.1 WEAKNESS: ICD-10-CM

## 2018-08-14 DIAGNOSIS — N40.0 BENIGN PROSTATIC HYPERPLASIA WITHOUT LOWER URINARY TRACT SYMPTOMS: ICD-10-CM

## 2018-08-14 DIAGNOSIS — E03.9 HYPOTHYROIDISM, UNSPECIFIED: ICD-10-CM

## 2018-08-14 DIAGNOSIS — I10 ESSENTIAL (PRIMARY) HYPERTENSION: ICD-10-CM

## 2018-08-14 DIAGNOSIS — N17.9 ACUTE KIDNEY FAILURE, UNSPECIFIED: ICD-10-CM

## 2018-08-14 LAB
ALBUMIN SERPL ELPH-MCNC: 3.3 G/DL — LOW (ref 3.5–5)
ALP SERPL-CCNC: 78 U/L — SIGNIFICANT CHANGE UP (ref 40–120)
ALT FLD-CCNC: 23 U/L DA — SIGNIFICANT CHANGE UP (ref 10–60)
ANION GAP SERPL CALC-SCNC: 9 MMOL/L — SIGNIFICANT CHANGE UP (ref 5–17)
ANION GAP SERPL CALC-SCNC: 9 MMOL/L — SIGNIFICANT CHANGE UP (ref 5–17)
AST SERPL-CCNC: 24 U/L — SIGNIFICANT CHANGE UP (ref 10–40)
BASOPHILS # BLD AUTO: 0 K/UL — SIGNIFICANT CHANGE UP (ref 0–0.2)
BASOPHILS NFR BLD AUTO: 0.4 % — SIGNIFICANT CHANGE UP (ref 0–2)
BILIRUB SERPL-MCNC: 0.3 MG/DL — SIGNIFICANT CHANGE UP (ref 0.2–1.2)
BUN SERPL-MCNC: 48 MG/DL — HIGH (ref 7–18)
BUN SERPL-MCNC: 49 MG/DL — HIGH (ref 7–18)
CALCIUM SERPL-MCNC: 9.3 MG/DL — SIGNIFICANT CHANGE UP (ref 8.4–10.5)
CALCIUM SERPL-MCNC: 9.5 MG/DL — SIGNIFICANT CHANGE UP (ref 8.4–10.5)
CHLORIDE SERPL-SCNC: 107 MMOL/L — SIGNIFICANT CHANGE UP (ref 96–108)
CHLORIDE SERPL-SCNC: 107 MMOL/L — SIGNIFICANT CHANGE UP (ref 96–108)
CK MB BLD-MCNC: 2 % — SIGNIFICANT CHANGE UP (ref 0–3.5)
CK MB CFR SERPL CALC: 3.2 NG/ML — SIGNIFICANT CHANGE UP (ref 0–3.6)
CK SERPL-CCNC: 162 U/L — SIGNIFICANT CHANGE UP (ref 35–232)
CO2 SERPL-SCNC: 25 MMOL/L — SIGNIFICANT CHANGE UP (ref 22–31)
CO2 SERPL-SCNC: 25 MMOL/L — SIGNIFICANT CHANGE UP (ref 22–31)
CREAT SERPL-MCNC: 2.26 MG/DL — HIGH (ref 0.5–1.3)
CREAT SERPL-MCNC: 2.35 MG/DL — HIGH (ref 0.5–1.3)
EOSINOPHIL # BLD AUTO: 0 K/UL — SIGNIFICANT CHANGE UP (ref 0–0.5)
EOSINOPHIL NFR BLD AUTO: 0.2 % — SIGNIFICANT CHANGE UP (ref 0–6)
GLUCOSE SERPL-MCNC: 113 MG/DL — HIGH (ref 70–99)
GLUCOSE SERPL-MCNC: 122 MG/DL — HIGH (ref 70–99)
HCT VFR BLD CALC: 35.8 % — LOW (ref 39–50)
HGB BLD-MCNC: 11.6 G/DL — LOW (ref 13–17)
LIDOCAIN IGE QN: 178 U/L — SIGNIFICANT CHANGE UP (ref 73–393)
LYMPHOCYTES # BLD AUTO: 0.8 K/UL — LOW (ref 1–3.3)
LYMPHOCYTES # BLD AUTO: 7.6 % — LOW (ref 13–44)
MCHC RBC-ENTMCNC: 31.6 PG — SIGNIFICANT CHANGE UP (ref 27–34)
MCHC RBC-ENTMCNC: 32.4 GM/DL — SIGNIFICANT CHANGE UP (ref 32–36)
MCV RBC AUTO: 97.4 FL — SIGNIFICANT CHANGE UP (ref 80–100)
MONOCYTES # BLD AUTO: 0.6 K/UL — SIGNIFICANT CHANGE UP (ref 0–0.9)
MONOCYTES NFR BLD AUTO: 5.6 % — SIGNIFICANT CHANGE UP (ref 2–14)
NEUTROPHILS # BLD AUTO: 8.9 K/UL — HIGH (ref 1.8–7.4)
NEUTROPHILS NFR BLD AUTO: 86.2 % — HIGH (ref 43–77)
PLATELET # BLD AUTO: 168 K/UL — SIGNIFICANT CHANGE UP (ref 150–400)
POTASSIUM SERPL-MCNC: 5.2 MMOL/L — SIGNIFICANT CHANGE UP (ref 3.5–5.3)
POTASSIUM SERPL-MCNC: 5.5 MMOL/L — HIGH (ref 3.5–5.3)
POTASSIUM SERPL-SCNC: 5.2 MMOL/L — SIGNIFICANT CHANGE UP (ref 3.5–5.3)
POTASSIUM SERPL-SCNC: 5.5 MMOL/L — HIGH (ref 3.5–5.3)
PROT SERPL-MCNC: 7.3 G/DL — SIGNIFICANT CHANGE UP (ref 6–8.3)
RBC # BLD: 3.67 M/UL — LOW (ref 4.2–5.8)
RBC # FLD: 12.9 % — SIGNIFICANT CHANGE UP (ref 10.3–14.5)
SODIUM SERPL-SCNC: 141 MMOL/L — SIGNIFICANT CHANGE UP (ref 135–145)
SODIUM SERPL-SCNC: 141 MMOL/L — SIGNIFICANT CHANGE UP (ref 135–145)
TROPONIN I SERPL-MCNC: 0.03 NG/ML — SIGNIFICANT CHANGE UP (ref 0–0.04)
TROPONIN I SERPL-MCNC: 0.05 NG/ML — HIGH (ref 0–0.04)
TSH SERPL-MCNC: 4.06 UU/ML — SIGNIFICANT CHANGE UP (ref 0.34–4.82)
WBC # BLD: 10.4 K/UL — SIGNIFICANT CHANGE UP (ref 3.8–10.5)
WBC # FLD AUTO: 10.4 K/UL — SIGNIFICANT CHANGE UP (ref 3.8–10.5)

## 2018-08-14 PROCEDURE — 70450 CT HEAD/BRAIN W/O DYE: CPT | Mod: 26

## 2018-08-14 PROCEDURE — 71045 X-RAY EXAM CHEST 1 VIEW: CPT | Mod: 26

## 2018-08-14 PROCEDURE — 99285 EMERGENCY DEPT VISIT HI MDM: CPT

## 2018-08-14 PROCEDURE — 72170 X-RAY EXAM OF PELVIS: CPT | Mod: 26

## 2018-08-14 RX ORDER — SODIUM CHLORIDE 9 MG/ML
1000 INJECTION INTRAMUSCULAR; INTRAVENOUS; SUBCUTANEOUS
Qty: 0 | Refills: 0 | Status: DISCONTINUED | OUTPATIENT
Start: 2018-08-14 | End: 2018-08-16

## 2018-08-14 RX ORDER — ASPIRIN/CALCIUM CARB/MAGNESIUM 324 MG
81 TABLET ORAL DAILY
Qty: 0 | Refills: 0 | Status: DISCONTINUED | OUTPATIENT
Start: 2018-08-14 | End: 2018-08-21

## 2018-08-14 RX ORDER — SENNA PLUS 8.6 MG/1
2 TABLET ORAL AT BEDTIME
Qty: 0 | Refills: 0 | Status: DISCONTINUED | OUTPATIENT
Start: 2018-08-14 | End: 2018-08-21

## 2018-08-14 RX ORDER — MIDODRINE HYDROCHLORIDE 2.5 MG/1
5 TABLET ORAL THREE TIMES A DAY
Qty: 0 | Refills: 0 | Status: DISCONTINUED | OUTPATIENT
Start: 2018-08-14 | End: 2018-08-21

## 2018-08-14 RX ORDER — DOCUSATE SODIUM 100 MG
100 CAPSULE ORAL
Qty: 0 | Refills: 0 | Status: DISCONTINUED | OUTPATIENT
Start: 2018-08-14 | End: 2018-08-21

## 2018-08-14 RX ORDER — FINASTERIDE 5 MG/1
5 TABLET, FILM COATED ORAL DAILY
Qty: 0 | Refills: 0 | Status: DISCONTINUED | OUTPATIENT
Start: 2018-08-14 | End: 2018-08-21

## 2018-08-14 RX ORDER — LEVOTHYROXINE SODIUM 125 MCG
25 TABLET ORAL DAILY
Qty: 0 | Refills: 0 | Status: DISCONTINUED | OUTPATIENT
Start: 2018-08-14 | End: 2018-08-21

## 2018-08-14 RX ORDER — HEPARIN SODIUM 5000 [USP'U]/ML
5000 INJECTION INTRAVENOUS; SUBCUTANEOUS EVERY 8 HOURS
Qty: 0 | Refills: 0 | Status: DISCONTINUED | OUTPATIENT
Start: 2018-08-14 | End: 2018-08-21

## 2018-08-14 RX ORDER — ATORVASTATIN CALCIUM 80 MG/1
20 TABLET, FILM COATED ORAL AT BEDTIME
Qty: 0 | Refills: 0 | Status: DISCONTINUED | OUTPATIENT
Start: 2018-08-14 | End: 2018-08-21

## 2018-08-14 NOTE — ED PROVIDER NOTE - OBJECTIVE STATEMENT
87 y/o M w/ PMHx of DM, hypothyroidism, HTN, presents to ED c/o 2 weeks of weakness, frequent falls, decreased appetite, dizziness, body aches, and cough. Pt fell twice at home and hit his head. He lives with his wife alone, and his wife is concerned for his safety. Pt denies any chest pain, abd pain, shortness of breath nausea, vomiting or any other complaints. NKDA.

## 2018-08-14 NOTE — H&P ADULT - PROBLEM SELECTOR PLAN 2
Patient has no history but possible A fib noted on EKG vs sinus aarythmia   will repeat EKG and monitor on tele   T1 negative  Will get T2 , T3   get Echocardiogram   CHADS Vasc is 1 ( age ) , will need decision about ac if true A fib   Will give dvt ppx for now and get Pacemaker interrogated Patient has no history but possible A fib noted on EKG vs sinus arrythmia   will repeat EKG and monitor on tele   T1 negative  Will get T2 , T3   get Echocardiogram   CHADS Vasc is 1 ( age ) , will need decision about ac if true A fib   Will give dvt ppx for now and get Pacemaker interrogated

## 2018-08-14 NOTE — ED ADULT NURSE NOTE - ED STAT RN HANDOFF DETAILS 3
Handover report given to Nurse Fonseca, Remains being nursed on cardiac monitor. Reported no complaints of pain .Is being admitted, awaiting bed availability.

## 2018-08-14 NOTE — H&P ADULT - NSHPPHYSICALEXAM_GEN_ALL_CORE
GENERAL: contracted in bed   HEENT: Normocephalic;  conjunctivae and sclerae clear; moist mucous membranes;   NECK : supple  CHEST/LUNG: Clear to auscultation bilaterally with good air entry   HEART: S1 S2  regular; no murmurs, gallops or rubs  ABDOMEN: Soft, Nontender, Nondistended; Bowel sounds present  EXTREMITIES: no cyanosis; no edema; no calf tenderness  SKIN: warm and dry; no rash  NERVOUS SYSTEM:  Awake and alert; Oriented  to place, person (AAO*2 )

## 2018-08-14 NOTE — H&P ADULT - NSHPLABSRESULTS_GEN_ALL_CORE
11.6   10.4  )-----------( 168      ( 14 Aug 2018 15:47 )             35.8       08-14    141  |  107  |  49<H>  ----------------------------<  122<H>  5.2   |  25  |  2.26<H>    Ca    9.3      14 Aug 2018 18:16    TPro  7.3  /  Alb  3.3<L>  /  TBili  0.3  /  DBili  x   /  AST  24  /  ALT  23  /  AlkPhos  78  08-14      < from: CT Head No Cont (08.14.18 @ 15:21) >    FINDINGS:  No acute intracranial hemorrhage, mass effect or midline shift.  No CT evidence of acute large territory vascular infarct.  The ventricles and cortical sulci are prominent reflecting parenchymal   volume loss.  Scattered hypodensities in the periventricular white matter are   nonspecific, but likely sequela of small vessel ischemic disease.  Intracranial atherosclerotic calcifications are present.    The visualized paranasal sinuses and mastoid air cells are well aerated.  No displaced calvarial fracture.    IMPRESSION:  No acute intracranial hemorrhage or mass effect.      < end of copied text >      < from: Xray Pelvis AP only (08.14.18 @ 16:12) >    INTERPRETATION:  CLINICAL STATEMENT: Pain.    TECHNIQUE: AP view of the pelvis.    COMPARISON: None.    FINDINGS:    There is no acute fracture or dislocation.     The hip joints are intact. There is mild enthesopathy of the iliac crests.    IMPRESSION:    No radiographic evidence of acute fracture or dislocation. If symptoms   persist, consider CT or MRI exam to exclude occult fracture.    < end of copied text >

## 2018-08-14 NOTE — H&P ADULT - ASSESSMENT
87 yo male with PMH of HTN , HLD , BPH , Hypothyroidism , DJD , CKD stage 3 , Autonomic dysfunction and Orthostatic hypotension ( admitted to [facility] in 2017 , discharged with PPM and started on Midodrine ) , comes in after h/o multiple falls. Patient reports of dizziness , almost like a room spinning sensation . Had a fall yesterday , where he hit his head , remembers the whole event. Patient did not sustain any other injuries. Denies any chest pain , sob , headache , loss of consciousness. Based on outpatient charts , patient has been following up with Cardio , neuro and PMD and has had multiple falls in last 1 year.     In Ed , BP : 113/ 71 mm hg , HR : 84 , Temp : 97. 6 F  cbc shows hb of 11.6 ( at baseline )  bmp shows K of 5.5 with K of 5.2 ; creatinine was 2.35 which improved to 2.26   Tsh wnl   T1 negative   Ct head wnl   Xray pelvis and CXR wnl   EKG shows Atrial fibrillation , no St- t wave changes     Will admit to telemetry for Multiple falls , new onset Atrial fibrillation.

## 2018-08-14 NOTE — H&P ADULT - PROBLEM SELECTOR PLAN 1
Patient has had h/o frequent falls from Orthostatic hypotension and autonomic dysfunction  Patient was on midodrine and pyridostigmine in past , now only on midodrine   Based on outpatient charts , patient was considered to be started on fludrocortisone for possible adrenal insufficiency   Primary team to follow up for the same   Will monitor Orthostatic q12   Continue midodrine and gentle hydration   will rule out causes of PN like b12 , d3 , folate , rpr   PT consult   Fall precautions   Ct head wnl   Xay pelvis wnl  Needs Pacemaker interrogation Patient has had h/o frequent falls from Orthostatic hypotension and autonomic dysfunction  Patient was on midodrine and pyridostigmine in past , now only on midodrine   Based on outpatient charts , patient was considered to be started on fludrocortisone for possible adrenal insufficiency   Primary team to follow up for the same   Orthostatic vitals recorded by myself in ER:  lying down : 117/60 mm hg   Sitting up : 124/70 mm hg (cannot stand at the moment )  Will monitor Orthostatic q12   Continue midodrine and gentle hydration   will rule out causes of PN like b12 , d3 , folate , rpr   PT consult   Fall precautions   Ct head wnl   Xay pelvis wnl  Needs Pacemaker interrogation Patient presents with h/o frequent falls from Orthostatic hypotension and autonomic dysfunction  Patient was on midodrine and pyridostigmine in past , now only on midodrine   Based on outpatient charts , patient was considered to be started on fludrocortisone for possible adrenal insufficiency   Primary team to follow up for the same   Orthostatic vitals recorded by myself in ER:  lying down : 117/60 mm hg   Sitting up : 124/70 mm hg (cannot stand at the moment )  Will monitor Orthostatic q12   Continue midodrine and gentle hydration   will rule out causes of PN like b12 , d3 , folate , rpr   PT consult   Fall precautions   Ct head wnl   Xay pelvis wnl  Needs Pacemaker interrogation

## 2018-08-14 NOTE — H&P ADULT - PROBLEM SELECTOR PLAN 6
IMPROVE VTE Individual Risk Assessment          RISK                                                          Points  [  ] Previous VTE                                                3  [  ] Thrombophilia                                             2  [  ] Lower limb paralysis                                   2        (unable to hold up >15 seconds)    [  ] Current Cancer                                             2         (within 6 months)  [ x ] Immobilization > 24 hrs                              1  [  ] ICU/CCU stay > 24 hours                             1  [x  ] Age > 60                                                         1    IMPROVE VTE Score: 2  Start on heparin for dvt ppx

## 2018-08-14 NOTE — ED ADULT NURSE NOTE - NSIMPLEMENTINTERV_GEN_ALL_ED
Implemented All Fall with Harm Risk Interventions:  [place] to call system. Call bell, personal items and telephone within reach. Instruct patient to call for assistance. Room bathroom lighting operational. Non-slip footwear when patient is off stretcher. Physically safe environment: no spills, clutter or unnecessary equipment. Stretcher in lowest position, wheels locked, appropriate side rails in place. Provide visual cue, wrist band, yellow gown, etc. Monitor gait and stability. Monitor for mental status changes and reorient to person, place, and time. Review medications for side effects contributing to fall risk. Reinforce activity limits and safety measures with patient and family. Provide visual clues: red socks.

## 2018-08-14 NOTE — ED ADULT NURSE NOTE - OBJECTIVE STATEMENT
Patient presents to ED s/p fall BIBA. As per patient and wife at bedside patient was walking to bathroom last night and fell , wife thought he would feel better but he was weak and didn't feel well so she called EMS

## 2018-08-14 NOTE — H&P ADULT - PROBLEM SELECTOR PLAN 3
FANY on CKD stage 3   Likely pre-renal from dehydration and hypotension   Will give IVF ( mild )  f/u bmp

## 2018-08-14 NOTE — ED ADULT NURSE NOTE - ED STAT RN HANDOFF DETAILS 4
Endorse from LOPEZ Dobbins pt awake in no acute distress, abrasions noted to knees pt stand with 2 person assist. right wrist 22 g heplock in place

## 2018-08-14 NOTE — H&P ADULT - HISTORY OF PRESENT ILLNESS
87 yo male with PMH of HTN , HLD , BPH , Hypothyroidism , DJD , CKD stage 3 , Autonomic dysfunction and Orthostatic hypotension ( admitted to [facility] in 2017 , discharged with PPM and started on Midodrine ) , comes in after h/o multiple falls. Patient reports of dizziness , almost like a room spinning sensation . Had a fall yesterday , where he hit his head , remembers the whole event. Patient did not sustain any other injuries. Denies any chest pain , sob , headache , loss of consciousness. Based on outpatient charts , patient has been following up with Cardio , neuro and PMD and has had multiple falls in last 1 year.     In Ed , BP : 113/ 71 mm hg , HR : 84 , Temp : 97. 6 F  cbc shows hb of 11.6 ( at baseline )  bmp shows K of 5.5 with K of 5.2 ; creatinine was 2.35 which improved to 2.26   Tsh wnl   T1 negative   Ct head wnl   Xray pelvis and CXR wnl

## 2018-08-14 NOTE — ED PROVIDER NOTE - PROGRESS NOTE DETAILS
Will rpt the EKG as pt with no hx of afib, suspect poor baseline, last EKG nsr and BMP, signed out to MAR to follow up with. no acute ekg changes on initial EKG for hyperK

## 2018-08-14 NOTE — ED PROVIDER NOTE - MEDICAL DECISION MAKING DETAILS
89 y/o M here w/ frequent falls and weaknesses. Will obtain labs, imaging, and likely admit for possible rehab.

## 2018-08-15 ENCOUNTER — APPOINTMENT (OUTPATIENT)
Dept: CARDIOLOGY | Facility: CLINIC | Age: 83
End: 2018-08-15

## 2018-08-15 DIAGNOSIS — R26.81 UNSTEADINESS ON FEET: ICD-10-CM

## 2018-08-15 LAB
24R-OH-CALCIDIOL SERPL-MCNC: 74.4 NG/ML — SIGNIFICANT CHANGE UP (ref 30–80)
ANION GAP SERPL CALC-SCNC: 10 MMOL/L — SIGNIFICANT CHANGE UP (ref 5–17)
APPEARANCE UR: CLEAR — SIGNIFICANT CHANGE UP
BASOPHILS # BLD AUTO: 0.1 K/UL — SIGNIFICANT CHANGE UP (ref 0–0.2)
BASOPHILS NFR BLD AUTO: 0.7 % — SIGNIFICANT CHANGE UP (ref 0–2)
BILIRUB UR-MCNC: NEGATIVE — SIGNIFICANT CHANGE UP
BUN SERPL-MCNC: 52 MG/DL — HIGH (ref 7–18)
CALCIUM SERPL-MCNC: 9.6 MG/DL — SIGNIFICANT CHANGE UP (ref 8.4–10.5)
CHLORIDE SERPL-SCNC: 106 MMOL/L — SIGNIFICANT CHANGE UP (ref 96–108)
CHOLEST SERPL-MCNC: 117 MG/DL — SIGNIFICANT CHANGE UP (ref 10–199)
CK MB BLD-MCNC: 1.6 % — SIGNIFICANT CHANGE UP (ref 0–3.5)
CK MB CFR SERPL CALC: 2.9 NG/ML — SIGNIFICANT CHANGE UP (ref 0–3.6)
CK SERPL-CCNC: 179 U/L — SIGNIFICANT CHANGE UP (ref 35–232)
CO2 SERPL-SCNC: 26 MMOL/L — SIGNIFICANT CHANGE UP (ref 22–31)
COLOR SPEC: SIGNIFICANT CHANGE UP
CREAT SERPL-MCNC: 1.99 MG/DL — HIGH (ref 0.5–1.3)
DIFF PNL FLD: NEGATIVE — SIGNIFICANT CHANGE UP
EOSINOPHIL # BLD AUTO: 0.2 K/UL — SIGNIFICANT CHANGE UP (ref 0–0.5)
EOSINOPHIL NFR BLD AUTO: 2.3 % — SIGNIFICANT CHANGE UP (ref 0–6)
GLUCOSE SERPL-MCNC: 95 MG/DL — SIGNIFICANT CHANGE UP (ref 70–99)
GLUCOSE UR QL: NEGATIVE — SIGNIFICANT CHANGE UP
HBA1C BLD-MCNC: 6.1 % — HIGH (ref 4–5.6)
HCT VFR BLD CALC: 33.6 % — LOW (ref 39–50)
HDLC SERPL-MCNC: 49 MG/DL — SIGNIFICANT CHANGE UP (ref 40–125)
HGB BLD-MCNC: 10.9 G/DL — LOW (ref 13–17)
KETONES UR-MCNC: NEGATIVE — SIGNIFICANT CHANGE UP
LEUKOCYTE ESTERASE UR-ACNC: NEGATIVE — SIGNIFICANT CHANGE UP
LIPID PNL WITH DIRECT LDL SERPL: 47 MG/DL — SIGNIFICANT CHANGE UP
LYMPHOCYTES # BLD AUTO: 1.3 K/UL — SIGNIFICANT CHANGE UP (ref 1–3.3)
LYMPHOCYTES # BLD AUTO: 17.4 % — SIGNIFICANT CHANGE UP (ref 13–44)
MAGNESIUM SERPL-MCNC: 1.9 MG/DL — SIGNIFICANT CHANGE UP (ref 1.6–2.6)
MCHC RBC-ENTMCNC: 31.4 PG — SIGNIFICANT CHANGE UP (ref 27–34)
MCHC RBC-ENTMCNC: 32.6 GM/DL — SIGNIFICANT CHANGE UP (ref 32–36)
MCV RBC AUTO: 96.4 FL — SIGNIFICANT CHANGE UP (ref 80–100)
MONOCYTES # BLD AUTO: 0.6 K/UL — SIGNIFICANT CHANGE UP (ref 0–0.9)
MONOCYTES NFR BLD AUTO: 7.6 % — SIGNIFICANT CHANGE UP (ref 2–14)
NEUTROPHILS # BLD AUTO: 5.5 K/UL — SIGNIFICANT CHANGE UP (ref 1.8–7.4)
NEUTROPHILS NFR BLD AUTO: 72 % — SIGNIFICANT CHANGE UP (ref 43–77)
NITRITE UR-MCNC: NEGATIVE — SIGNIFICANT CHANGE UP
PH UR: 5 — SIGNIFICANT CHANGE UP (ref 5–8)
PHOSPHATE SERPL-MCNC: 3.3 MG/DL — SIGNIFICANT CHANGE UP (ref 2.5–4.5)
PLATELET # BLD AUTO: 181 K/UL — SIGNIFICANT CHANGE UP (ref 150–400)
POTASSIUM SERPL-MCNC: 4.4 MMOL/L — SIGNIFICANT CHANGE UP (ref 3.5–5.3)
POTASSIUM SERPL-SCNC: 4.4 MMOL/L — SIGNIFICANT CHANGE UP (ref 3.5–5.3)
PROT UR-MCNC: 30 MG/DL
RBC # BLD: 3.48 M/UL — LOW (ref 4.2–5.8)
RBC # FLD: 12.8 % — SIGNIFICANT CHANGE UP (ref 10.3–14.5)
SODIUM SERPL-SCNC: 142 MMOL/L — SIGNIFICANT CHANGE UP (ref 135–145)
SP GR SPEC: 1.01 — SIGNIFICANT CHANGE UP (ref 1.01–1.02)
TOTAL CHOLESTEROL/HDL RATIO MEASUREMENT: 2.4 RATIO — LOW (ref 3.4–9.6)
TRIGL SERPL-MCNC: 106 MG/DL — SIGNIFICANT CHANGE UP (ref 10–149)
TROPONIN I SERPL-MCNC: 0.03 NG/ML — SIGNIFICANT CHANGE UP (ref 0–0.04)
TSH SERPL-MCNC: 2.01 UU/ML — SIGNIFICANT CHANGE UP (ref 0.34–4.82)
UROBILINOGEN FLD QL: NEGATIVE — SIGNIFICANT CHANGE UP
VIT B12 SERPL-MCNC: 589 PG/ML — SIGNIFICANT CHANGE UP (ref 232–1245)
WBC # BLD: 7.6 K/UL — SIGNIFICANT CHANGE UP (ref 3.8–10.5)
WBC # FLD AUTO: 7.6 K/UL — SIGNIFICANT CHANGE UP (ref 3.8–10.5)

## 2018-08-15 PROCEDURE — 99233 SBSQ HOSP IP/OBS HIGH 50: CPT | Mod: GC

## 2018-08-15 RX ADMIN — Medication 81 MILLIGRAM(S): at 11:42

## 2018-08-15 RX ADMIN — Medication 100 MILLIGRAM(S): at 06:28

## 2018-08-15 RX ADMIN — SENNA PLUS 2 TABLET(S): 8.6 TABLET ORAL at 00:08

## 2018-08-15 RX ADMIN — SODIUM CHLORIDE 60 MILLILITER(S): 9 INJECTION INTRAMUSCULAR; INTRAVENOUS; SUBCUTANEOUS at 11:43

## 2018-08-15 RX ADMIN — Medication 100 MILLIGRAM(S): at 17:23

## 2018-08-15 RX ADMIN — MIDODRINE HYDROCHLORIDE 5 MILLIGRAM(S): 2.5 TABLET ORAL at 06:28

## 2018-08-15 RX ADMIN — HEPARIN SODIUM 5000 UNIT(S): 5000 INJECTION INTRAVENOUS; SUBCUTANEOUS at 22:50

## 2018-08-15 RX ADMIN — MIDODRINE HYDROCHLORIDE 5 MILLIGRAM(S): 2.5 TABLET ORAL at 23:07

## 2018-08-15 RX ADMIN — SODIUM CHLORIDE 60 MILLILITER(S): 9 INJECTION INTRAMUSCULAR; INTRAVENOUS; SUBCUTANEOUS at 00:25

## 2018-08-15 RX ADMIN — HEPARIN SODIUM 5000 UNIT(S): 5000 INJECTION INTRAVENOUS; SUBCUTANEOUS at 00:08

## 2018-08-15 RX ADMIN — MIDODRINE HYDROCHLORIDE 5 MILLIGRAM(S): 2.5 TABLET ORAL at 16:11

## 2018-08-15 RX ADMIN — HEPARIN SODIUM 5000 UNIT(S): 5000 INJECTION INTRAVENOUS; SUBCUTANEOUS at 06:28

## 2018-08-15 RX ADMIN — ATORVASTATIN CALCIUM 20 MILLIGRAM(S): 80 TABLET, FILM COATED ORAL at 00:09

## 2018-08-15 RX ADMIN — Medication 25 MICROGRAM(S): at 06:28

## 2018-08-15 RX ADMIN — ATORVASTATIN CALCIUM 20 MILLIGRAM(S): 80 TABLET, FILM COATED ORAL at 22:50

## 2018-08-15 RX ADMIN — FINASTERIDE 5 MILLIGRAM(S): 5 TABLET, FILM COATED ORAL at 11:42

## 2018-08-15 RX ADMIN — HEPARIN SODIUM 5000 UNIT(S): 5000 INJECTION INTRAVENOUS; SUBCUTANEOUS at 16:11

## 2018-08-15 RX ADMIN — SENNA PLUS 2 TABLET(S): 8.6 TABLET ORAL at 22:50

## 2018-08-15 NOTE — ED PROVIDER NOTE - CPE EDP EYES NORM
AGAPITO AKSILVINA:[id number],   62yFemale followed for:  No Known Allergies    PAST MEDICAL & SURGICAL HISTORY:  Retinopathy  Neuropathy  DM (diabetes mellitus)  HTN (hypertension)  History of cholecystectomy    FAMILY HISTORY:  Coronary artery disease (Sibling): pre mature CAD    MEDICATIONS  (STANDING):  aspirin enteric coated 81 milliGRAM(s) Oral daily  clopidogrel Tablet 75 milliGRAM(s) Oral daily  dextrose 5%. 1000 milliLiter(s) (50 mL/Hr) IV Continuous <Continuous>  dextrose 50% Injectable 12.5 Gram(s) IV Push once  dextrose 50% Injectable 25 Gram(s) IV Push once  dextrose 50% Injectable 25 Gram(s) IV Push once  dextrose 50% Injectable 50 milliLiter(s) IV Push every 15 minutes  dextrose 50% Injectable 25 milliLiter(s) IV Push every 15 minutes  docusate sodium 100 milliGRAM(s) Oral three times a day  famotidine    Tablet 20 milliGRAM(s) Oral daily  insulin glargine Injectable (LANTUS) 12 Unit(s) SubCutaneous at bedtime  insulin Infusion 4 Unit(s)/Hr (4 mL/Hr) IV Continuous <Continuous>  insulin lispro (HumaLOG) corrective regimen sliding scale   SubCutaneous three times a day before meals  insulin lispro (HumaLOG) corrective regimen sliding scale   SubCutaneous at bedtime  insulin lispro Injectable (HumaLOG) 8 Unit(s) SubCutaneous three times a day before meals  metoprolol tartrate 50 milliGRAM(s) Oral two times a day  polyethylene glycol 3350 17 Gram(s) Oral daily  sodium chloride 0.9% lock flush 3 milliLiter(s) IV Push every 8 hours  sodium chloride 0.9%. 1000 milliLiter(s) (10 mL/Hr) IV Continuous <Continuous>    MEDICATIONS  (PRN):  dextrose 40% Gel 15 Gram(s) Oral once PRN Blood Glucose LESS THAN 70 milliGRAM(s)/deciliter  glucagon  Injectable 1 milliGRAM(s) IntraMuscular once PRN Glucose LESS THAN 70 milligrams/deciliter  oxyCODONE    IR 5 milliGRAM(s) Oral every 6 hours PRN Moderate Pain (4 - 6)      Vital Signs Last 24 Hrs  T(C): 36.9 (15 Aug 2018 05:16), Max: 37.2 (14 Aug 2018 20:00)  T(F): 98.4 (15 Aug 2018 05:16), Max: 99 (14 Aug 2018 20:00)  HR: 89 (15 Aug 2018 05:16) (81 - 90)  BP: 137/79 (15 Aug 2018 05:16) (132/80 - 163/85)  BP(mean): --  RR: 18 (15 Aug 2018 05:16) (18 - 18)  SpO2: 97% (15 Aug 2018 05:16) (96% - 97%)  nc/at  s1s2  cta  soft, nt, nd no guarding or rebound  no c/c/e    CBC Full  -  ( 15 Aug 2018 07:17 )  WBC Count : 8.8 K/uL  Hemoglobin : 10.5 g/dL  Hematocrit : 31.6 %  Platelet Count - Automated : 226 K/uL  Mean Cell Volume : 87.3 fl  Mean Cell Hemoglobin : 29.0 pg  Mean Cell Hemoglobin Concentration : 33.2 gm/dL  Auto Neutrophil # : x  Auto Lymphocyte # : x  Auto Monocyte # : x  Auto Eosinophil # : x  Auto Basophil # : x  Auto Neutrophil % : x  Auto Lymphocyte % : x  Auto Monocyte % : x  Auto Eosinophil % : x  Auto Basophil % : x    08-15    134<L>  |  97  |  20  ----------------------------<  209<H>  4.4   |  25  |  0.99    Ca    8.8      15 Aug 2018 07:17    TPro  6.3  /  Alb  3.5  /  TBili  0.9  /  DBili  x   /  AST  23  /  ALT  255<H>  /  AlkPhos  69  08-15 normal...

## 2018-08-15 NOTE — CHART NOTE - NSCHARTNOTEFT_GEN_A_CORE
patient's second troponin came mildly elevated . He denies any complaints, repeat EKG show no new changes. Will follow morning troponin.

## 2018-08-15 NOTE — PATIENT PROFILE ADULT. - PRO ANTICIPATED DISCH DISP
Received call from Dr. Jaxon Hollis, pediatrician, who states that the baby was brought to his office and examined for source of fever and discomfort. Cleaned wax out of her ears and TM's look fine. He thinks she has a viral illness. She has been passing soft stools on the miralax. As mom thinks either the bactrim or the miralax is causing her discomfort, Dr. Prince Carreno changed the miralax to milk of magnesia and he will change the bactrim to nitrofurantoin. home

## 2018-08-15 NOTE — PROGRESS NOTE ADULT - SUBJECTIVE AND OBJECTIVE BOX
Patient seen and examined earlier this morning; case was discussed with the admitting resident- Please link this note to the history and physical from 18    HPI:  89 yo male with PMH of HTN , HLD , BPH , Hypothyroidism , DJD , CKD stage 3 , Autonomic dysfunction and Orthostatic hypotension ( admitted to [facility] in 2017 , discharged with PPM and started on Midodrine ) , comes in after h/o multiple falls. Patient reports of dizziness , almost like a room spinning sensation . Had a fall yesterday , where he hit his head , remembers the whole event. Patient did not sustain any other injuries. Denies any chest pain , sob , headache , loss of consciousness. Based on outpatient charts , patient has been following up with Cardio , neuro and PMD and has had multiple falls in last 1 year.     In Ed , BP : 113/ 71 mm hg , HR : 84 , Temp : 97. 6 F  cbc shows hb of 11.6 ( at baseline )  bmp shows K of 5.5 with K of 5.2 ; creatinine was 2.35 which improved to 2.26   Tsh wnl   T1 negative   Ct head wnl   Xray pelvis and CXR wnl (14 Aug 2018 20:08)      ROS: as in the HPI; all other ROS negative    Vital Signs Last 24 Hrs  T(C): 36.7 (15 Aug 2018 15:51), Max: 36.9 (15 Aug 2018 06:29)  T(F): 98 (15 Aug 2018 15:51), Max: 98.4 (15 Aug 2018 06:29)  HR: 79 (15 Aug 2018 15:51) (72 - 79)  BP: 122/77 (15 Aug 2018 15:51) (122/77 - 139/55)  BP(mean): --  RR: 18 (15 Aug 2018 15:51) (18 - 18)  SpO2: 99% (15 Aug 2018 15:51) (99% - 100%)    GEN: NAD  HEENT- normocephalic; mouth moist  CVS- S1S2+  LUNGS- clear to auscultation; no wheezing  ABD: Soft , nontender, nondistended, Bowel sounds are present  EXTREMITY: no calf tenderness, no cyanosis, no edema  NEURO: AAOx3; non focal neurologic exam; cranial nerves grossly intact  PSYCH: normal affect and behavior  BACK: no swelling or mass;   VASCULAR: ++ distal peripheral pulses  SKIN: warm and dry.       Labs Reviewed:                         10.9   7.6   )-----------( 181      ( 15 Aug 2018 07:57 )             33.6     08-15    142  |  106  |  52<H>  ----------------------------<  95  4.4   |  26  |  1.99<H>    Ca    9.6      15 Aug 2018 07:57  Phos  3.3     08-15  Mg     1.9     -15    TPro  7.3  /  Alb  3.3<L>  /  TBili  0.3  /  DBili  x   /  AST  24  /  ALT  23  /  AlkPhos  78  08-14    CARDIAC MARKERS ( 15 Aug 2018 15:30 )  0.028 ng/mL / x     / 179 U/L / x     / 2.9 ng/mL  CARDIAC MARKERS ( 14 Aug 2018 23:13 )  0.050 ng/mL / x     / 162 U/L / x     / 3.2 ng/mL  CARDIAC MARKERS ( 14 Aug 2018 16:20 )  0.029 ng/mL / x     / x     / x     / x          Urinalysis Basic - ( 15 Aug 2018 03:59 )    Color: Pale Yellow / Appearance: Clear / S.015 / pH: x  Gluc: x / Ketone: Negative  / Bili: Negative / Urobili: Negative   Blood: x / Protein: 30 mg/dL / Nitrite: Negative   Leuk Esterase: Negative / RBC: 0-2 /HPF / WBC 0-2 /HPF   Sq Epi: x / Non Sq Epi: x / Bacteria: x        BNP:   MEDICATIONS  (STANDING):  aspirin  chewable 81 milliGRAM(s) Oral daily  atorvastatin 20 milliGRAM(s) Oral at bedtime  docusate sodium 100 milliGRAM(s) Oral two times a day  finasteride 5 milliGRAM(s) Oral daily  heparin  Injectable 5000 Unit(s) SubCutaneous every 8 hours  levothyroxine 25 MICROGram(s) Oral daily  midodrine 5 milliGRAM(s) Oral three times a day  senna 2 Tablet(s) Oral at bedtime  sodium chloride 0.9%. 1000 milliLiter(s) (60 mL/Hr) IV Continuous <Continuous>    MEDICATIONS  (PRN):      CXR reviewed: < from: Xray Chest 1 View AP/PA (18 @ 14:04) >    EXAM:  XR CHEST AP OR PA 1V                            PROCEDURE DATE:  2018          INTERPRETATION:  CLINICAL STATEMENT: Chest pain.    TECHNIQUE: AP view of the chest.    COMPARISON: 3/30/2017    FINDINGS/  IMPRESSION:  Left cardiac deviceagain noted.    No consolidation or pleural effusion    Heart size within normal limits.    < end of copied text >      EKG Reviewed: < from: 12 Lead ECG (18 @ 15:48) >  Ventricular Rate 81 BPM    Atrial Rate 77 BPM    QRS Duration 120 ms    Q-T Interval 386 ms    QTC Calculation(Bezet) 448 ms    < end of copied text >           Patient is a 88y old  Male who presents with a chief complaint of dizziness and fall (14 Aug 2018 20:08)   admitted for   1. Unsteady gait  2. Recurrent falls  3. Orthostatic hypotension    continue Midodrine  Continue fluids  FAll precautions  Adjusting current meds.  PT as tolerated  Supportive care        Plan of care discussed with patient ;  all questions and concerns were addressed.  Discussed with Patient's family

## 2018-08-16 ENCOUNTER — TRANSCRIPTION ENCOUNTER (OUTPATIENT)
Age: 83
End: 2018-08-16

## 2018-08-16 LAB
ANION GAP SERPL CALC-SCNC: 10 MMOL/L — SIGNIFICANT CHANGE UP (ref 5–17)
BUN SERPL-MCNC: 43 MG/DL — HIGH (ref 7–18)
CALCIUM SERPL-MCNC: 9.2 MG/DL — SIGNIFICANT CHANGE UP (ref 8.4–10.5)
CHLORIDE SERPL-SCNC: 106 MMOL/L — SIGNIFICANT CHANGE UP (ref 96–108)
CO2 SERPL-SCNC: 26 MMOL/L — SIGNIFICANT CHANGE UP (ref 22–31)
CREAT SERPL-MCNC: 1.55 MG/DL — HIGH (ref 0.5–1.3)
GLUCOSE SERPL-MCNC: 83 MG/DL — SIGNIFICANT CHANGE UP (ref 70–99)
HCT VFR BLD CALC: 31.9 % — LOW (ref 39–50)
HGB BLD-MCNC: 10.5 G/DL — LOW (ref 13–17)
MCHC RBC-ENTMCNC: 32.1 PG — SIGNIFICANT CHANGE UP (ref 27–34)
MCHC RBC-ENTMCNC: 33 GM/DL — SIGNIFICANT CHANGE UP (ref 32–36)
MCV RBC AUTO: 97.3 FL — SIGNIFICANT CHANGE UP (ref 80–100)
PLATELET # BLD AUTO: 170 K/UL — SIGNIFICANT CHANGE UP (ref 150–400)
POTASSIUM SERPL-MCNC: 4.4 MMOL/L — SIGNIFICANT CHANGE UP (ref 3.5–5.3)
POTASSIUM SERPL-SCNC: 4.4 MMOL/L — SIGNIFICANT CHANGE UP (ref 3.5–5.3)
RBC # BLD: 3.28 M/UL — LOW (ref 4.2–5.8)
RBC # FLD: 13 % — SIGNIFICANT CHANGE UP (ref 10.3–14.5)
SODIUM SERPL-SCNC: 142 MMOL/L — SIGNIFICANT CHANGE UP (ref 135–145)
T PALLIDUM AB TITR SER: NEGATIVE — SIGNIFICANT CHANGE UP
WBC # BLD: 7.4 K/UL — SIGNIFICANT CHANGE UP (ref 3.8–10.5)
WBC # FLD AUTO: 7.4 K/UL — SIGNIFICANT CHANGE UP (ref 3.8–10.5)

## 2018-08-16 PROCEDURE — 99232 SBSQ HOSP IP/OBS MODERATE 35: CPT | Mod: GC

## 2018-08-16 PROCEDURE — 93306 TTE W/DOPPLER COMPLETE: CPT | Mod: 26

## 2018-08-16 PROCEDURE — 93010 ELECTROCARDIOGRAM REPORT: CPT

## 2018-08-16 RX ORDER — FINASTERIDE 5 MG/1
1 TABLET, FILM COATED ORAL
Qty: 0 | Refills: 0 | COMMUNITY

## 2018-08-16 RX ORDER — FINASTERIDE 5 MG/1
1 TABLET, FILM COATED ORAL
Qty: 0 | Refills: 0 | COMMUNITY
Start: 2018-08-16

## 2018-08-16 RX ORDER — SODIUM CHLORIDE 9 MG/ML
1000 INJECTION INTRAMUSCULAR; INTRAVENOUS; SUBCUTANEOUS
Qty: 0 | Refills: 0 | Status: DISCONTINUED | OUTPATIENT
Start: 2018-08-16 | End: 2018-08-19

## 2018-08-16 RX ORDER — LEVOTHYROXINE SODIUM 125 MCG
1 TABLET ORAL
Qty: 0 | Refills: 0 | COMMUNITY
Start: 2018-08-16

## 2018-08-16 RX ORDER — ERGOCALCIFEROL 1.25 MG/1
1 CAPSULE ORAL
Qty: 0 | Refills: 0 | COMMUNITY

## 2018-08-16 RX ORDER — PANTOPRAZOLE SODIUM 20 MG/1
1 TABLET, DELAYED RELEASE ORAL
Qty: 0 | Refills: 0 | COMMUNITY

## 2018-08-16 RX ADMIN — Medication 25 MICROGRAM(S): at 05:27

## 2018-08-16 RX ADMIN — SODIUM CHLORIDE 60 MILLILITER(S): 9 INJECTION INTRAMUSCULAR; INTRAVENOUS; SUBCUTANEOUS at 14:02

## 2018-08-16 RX ADMIN — SENNA PLUS 2 TABLET(S): 8.6 TABLET ORAL at 21:06

## 2018-08-16 RX ADMIN — ATORVASTATIN CALCIUM 20 MILLIGRAM(S): 80 TABLET, FILM COATED ORAL at 21:06

## 2018-08-16 RX ADMIN — HEPARIN SODIUM 5000 UNIT(S): 5000 INJECTION INTRAVENOUS; SUBCUTANEOUS at 05:27

## 2018-08-16 RX ADMIN — HEPARIN SODIUM 5000 UNIT(S): 5000 INJECTION INTRAVENOUS; SUBCUTANEOUS at 14:02

## 2018-08-16 RX ADMIN — Medication 100 MILLIGRAM(S): at 05:27

## 2018-08-16 RX ADMIN — FINASTERIDE 5 MILLIGRAM(S): 5 TABLET, FILM COATED ORAL at 11:07

## 2018-08-16 RX ADMIN — MIDODRINE HYDROCHLORIDE 5 MILLIGRAM(S): 2.5 TABLET ORAL at 21:06

## 2018-08-16 RX ADMIN — SODIUM CHLORIDE 60 MILLILITER(S): 9 INJECTION INTRAMUSCULAR; INTRAVENOUS; SUBCUTANEOUS at 21:06

## 2018-08-16 RX ADMIN — HEPARIN SODIUM 5000 UNIT(S): 5000 INJECTION INTRAVENOUS; SUBCUTANEOUS at 21:05

## 2018-08-16 RX ADMIN — MIDODRINE HYDROCHLORIDE 5 MILLIGRAM(S): 2.5 TABLET ORAL at 05:27

## 2018-08-16 RX ADMIN — MIDODRINE HYDROCHLORIDE 5 MILLIGRAM(S): 2.5 TABLET ORAL at 14:01

## 2018-08-16 RX ADMIN — Medication 81 MILLIGRAM(S): at 11:07

## 2018-08-16 NOTE — DISCHARGE NOTE ADULT - PATIENT PORTAL LINK FT
You can access the [facility], offered by [facility], by registering with the following website: [email or web address]

## 2018-08-16 NOTE — PHYSICAL THERAPY INITIAL EVALUATION ADULT - GENERAL OBSERVATIONS, REHAB EVAL
Consult received,EMR, radiology and labs reviewed. Patient received supine in bed, NAD, son at bedside, forgetful at times. Patient agreed to EVALUATION from Physical Therapist.

## 2018-08-16 NOTE — PROGRESS NOTE ADULT - PROBLEM SELECTOR PLAN 2
Patient has no history but possible A fib noted on EKG vs sinus arrythmia   will repeat EKG and monitor on tele   T1 negative  Will get T2 , T3   get Echocardiogram   CHADS Vasc is 1 ( age ) , will need decision about ac if true A fib   Will give dvt ppx for now and get Pacemaker interrogated Patient has no history but possible A fib noted on EKG vs sinus arrythmia   will  and monitor on tele   Trop -ve  TSH wnl  f/u Echocardiogram   f/u PPM interrogation  CHADS Vasc is 1 ( age ) , will need decision about ac if true A fib   Will give dvt ppx for now and get Pacemaker interrogated likely old Afib  dc tele   Trop -ve  TSH wnl  Echocardiogram eF 65% ( 4/2018)  PPM interrogation done   CHADS Vasc is 1 ( age ) , will need decision about ac if true A fib   Will give dvt ppx for now

## 2018-08-16 NOTE — DISCHARGE NOTE ADULT - MEDICATION SUMMARY - MEDICATIONS TO TAKE
I will START or STAY ON the medications listed below when I get home from the hospital:    finasteride 5 mg oral tablet  -- 1 tab(s) by mouth once a day  -- Indication: For BPH (benign prostatic hyperplasia)    aspirin 81 mg oral tablet, chewable  -- 1 tab(s) by mouth once a day  -- Indication: For Prophylactic measure    atorvastatin 20 mg oral tablet  -- 1 tab(s) by mouth once a day (at bedtime)  -- Indication: For Hyperlipidemia    senna oral tablet  -- 2 tab(s) by mouth once a day (at bedtime)  -- Indication: For constipation    Colace 100 mg oral capsule  -- 1 cap(s) by mouth once a day (at bedtime)  -- Indication: For contipation    midodrine 10 mg oral tablet  -- 0.5 tab(s) by mouth 3 times a day  -- Indication: For Unsteady gait    Lubricant Eye Drops ophthalmic solution  -- 1 drop(s) to each affected eye every 4 hours  -- Indication: For eye    omeprazole 40 mg oral delayed release capsule  -- 1 cap(s) by mouth once a day  -- Indication: For GI prophylaxis    levothyroxine 25 mcg (0.025 mg) oral tablet  -- 1 tab(s) by mouth once a day  -- Indication: For Hypothyroid

## 2018-08-16 NOTE — DISCHARGE NOTE ADULT - HOSPITAL COURSE
87 yo male with PMH of HTN , HLD , BPH , Hypothyroidism , DJD , CKD stage 3 , Autonomic dysfunction and Orthostatic hypotension ( admitted to [facility] in 2017 , discharged with PPM and started on Midodrine ) , comes in after h/o multiple falls. Patient reports of dizziness , almost like a room spinning sensation . Had a fall yesterday , where he hit his head , remembers the whole event. Patient did not sustain any other injuries. Denies any chest pain , sob , headache , loss of consciousness. Based on outpatient charts , patient has been following up with Cardio , neuro and PMD and has had multiple falls in last 1 year.   In Ed , BP : 113/ 71 mm hg , HR : 84 , Temp : 97. 6 F,cbc shows hb of 11.6 ( at baseline ),bmp shows K of 5.5 with K of 5.2 ; creatinine was 2.35 which improved to 2.26 ,Tsh wnl ,Ct head wnl ,Xray pelvis and CXR wnl   Pt admitted to floor for workup of falls. pt's bp usually runs low so pt was treated with IVF as pt seemed dehydrated. Creatinine trended down with IVF. Pacemaker ( [place]) interrogation was done and it was working fine. CE were negative , ECHO noted for Ef  and  Orthostatic were + , PT recommended PEPE     Pt is safe for discharge as per attending Dr. Colvin.

## 2018-08-16 NOTE — DISCHARGE NOTE ADULT - MEDICATION SUMMARY - MEDICATIONS TO STOP TAKING
I will STOP taking the medications listed below when I get home from the hospital:    Protonix 40 mg oral delayed release tablet  -- 1 tab(s) by mouth once a day I will STOP taking the medications listed below when I get home from the hospital:    pyridostigmine 60 mg oral tablet  -- 0.5 tab(s) by mouth once a day    Protonix 40 mg oral delayed release tablet  -- 1 tab(s) by mouth once a day

## 2018-08-16 NOTE — PROGRESS NOTE ADULT - SUBJECTIVE AND OBJECTIVE BOX
Patient is a 88y old  Male who presents with a chief complaint of dizziness and fall (14 Aug 2018 20:08)      INTERVAL HPI/OVERNIGHT EVENTS:    MEDICATIONS  (STANDING):  aspirin  chewable 81 milliGRAM(s) Oral daily  atorvastatin 20 milliGRAM(s) Oral at bedtime  docusate sodium 100 milliGRAM(s) Oral two times a day  finasteride 5 milliGRAM(s) Oral daily  heparin  Injectable 5000 Unit(s) SubCutaneous every 8 hours  levothyroxine 25 MICROGram(s) Oral daily  midodrine 5 milliGRAM(s) Oral three times a day  senna 2 Tablet(s) Oral at bedtime  sodium chloride 0.9%. 1000 milliLiter(s) (60 mL/Hr) IV Continuous <Continuous>    MEDICATIONS  (PRN):      Allergies    No Known Allergies    Intolerances        REVIEW OF SYSTEMS:  CONSTITUTIONAL: No fever, weight loss, or fatigue  RESPIRATORY: No cough, wheezing, chills or hemoptysis; No shortness of breath  CARDIOVASCULAR: No chest pain, palpitations, dizziness, or leg swelling  GASTROINTESTINAL: No abdominal or epigastric pain. No nausea, vomiting, or hematemesis; No diarrhea or constipation. No melena or hematochezia.  NEUROLOGICAL: No headaches, memory loss, loss of strength, numbness, or tremors  SKIN: No itching, burning, rashes, or lesions     Vital Signs Last 24 Hrs  T(C): 36.9 (16 Aug 2018 03:26), Max: 37.2 (15 Aug 2018 20:33)  T(F): 98.4 (16 Aug 2018 03:26), Max: 99 (15 Aug 2018 20:33)  HR: 74 (16 Aug 2018 03:26) (68 - 79)  BP: 152/68 (16 Aug 2018 03:26) (120/74 - 152/68)  BP(mean): --  RR: 18 (16 Aug 2018 03:26) (18 - 18)  SpO2: 98% (16 Aug 2018 03:26) (97% - 100%)    PHYSICAL EXAM:  GENERAL: NAD,  HEAD:  Atraumatic, Normocephalic  EYES: EOMI, PERRLA, conjunctiva and sclera clear  NECK: Supple, No JVD, Normal thyroid  CHEST/LUNG: Clear to percussion bilaterally; No rales, rhonchi, wheezing, or rubs  HEART: Regular rate and rhythm; No murmurs, rubs, or gallops  ABDOMEN: Soft, Nontender, Nondistended; Bowel sounds present  NERVOUS SYSTEM:  Alert & Oriented X3, Good concentration; Motor Strength 5/5 B/L   EXTREMITIES:  2+ Peripheral Pulses, No clubbing, cyanosis, or edema  SKIN;    LABS:                        10.9   7.6   )-----------( 181      ( 15 Aug 2018 07:57 )             33.6     08-15    142  |  106  |  52<H>  ----------------------------<  95  4.4   |  26  |  1.99<H>    Ca    9.6      15 Aug 2018 07:57  Phos  3.3     08-15  Mg     1.9     -15    TPro  7.3  /  Alb  3.3<L>  /  TBili  0.3  /  DBili  x   /  AST  24  /  ALT  23  /  AlkPhos  78  08-14      Urinalysis Basic - ( 15 Aug 2018 03:59 )    Color: Pale Yellow / Appearance: Clear / S.015 / pH: x  Gluc: x / Ketone: Negative  / Bili: Negative / Urobili: Negative   Blood: x / Protein: 30 mg/dL / Nitrite: Negative   Leuk Esterase: Negative / RBC: 0-2 /HPF / WBC 0-2 /HPF   Sq Epi: x / Non Sq Epi: x / Bacteria: x      CAPILLARY BLOOD GLUCOSE          RADIOLOGY & ADDITIONAL TESTS: Patient is a 88y old  Male who presents with a chief complaint of dizziness and fall (14 Aug 2018 20:08)      INTERVAL HPI/OVERNIGHT EVENTS:no new complaints    MEDICATIONS  (STANDING):  aspirin  chewable 81 milliGRAM(s) Oral daily  atorvastatin 20 milliGRAM(s) Oral at bedtime  docusate sodium 100 milliGRAM(s) Oral two times a day  finasteride 5 milliGRAM(s) Oral daily  heparin  Injectable 5000 Unit(s) SubCutaneous every 8 hours  levothyroxine 25 MICROGram(s) Oral daily  midodrine 5 milliGRAM(s) Oral three times a day  senna 2 Tablet(s) Oral at bedtime  sodium chloride 0.9%. 1000 milliLiter(s) (60 mL/Hr) IV Continuous <Continuous>    MEDICATIONS  (PRN):      Allergies    No Known Allergies    Intolerances        REVIEW OF SYSTEMS:  CONSTITUTIONAL: No fever, weight loss, or fatigue  RESPIRATORY: No cough, wheezing, chills or hemoptysis; No shortness of breath  CARDIOVASCULAR: No chest pain, palpitations, dizziness, or leg swelling  GASTROINTESTINAL: No abdominal or epigastric pain. No nausea, vomiting, or hematemesis; No diarrhea or constipation. No melena or hematochezia.  NEUROLOGICAL: No headaches, memory loss, loss of strength, numbness, or tremors  SKIN: No itching, burning, rashes, or lesions     Vital Signs Last 24 Hrs  T(C): 37 (16 Aug 2018 11:09), Max: 37.2 (15 Aug 2018 20:33)  T(F): 98.6 (16 Aug 2018 11:09), Max: 99 (15 Aug 2018 20:33)  HR: 78 (16 Aug 2018 11:09) (68 - 79)  BP: 116/64 (16 Aug 2018 11:09) (116/64 - 152/68)  BP(mean): --  RR: 18 (16 Aug 2018 11:09) (18 - 18)  SpO2: 98% (16 Aug 2018 11:09) (97% - 100%)    PHYSICAL EXAM:  GENERAL: NAD,  HEAD:  Atraumatic, Normocephalic  EYES: EOMI, PERRLA, conjunctiva and sclera clear  NECK: Supple, No JVD, Normal thyroid  CHEST/LUNG: Clear to percussion bilaterally; No rales, rhonchi, wheezing, or rubs  HEART: Regular rate and rhythm; No murmurs, rubs, or gallops  ABDOMEN: Soft, Nontender, Nondistended; Bowel sounds present  NERVOUS SYSTEM:  Alert & Oriented X2 , memory loss; Motor Strength 4/5 B/L   EXTREMITIES:  2+ Peripheral Pulses, No clubbing, cyanosis, or edema  SKIN;    LABS:                                   10.5   7.4   )-----------( 170      ( 16 Aug 2018 07:51 )             31.9   08-16    142  |  106  |  43<H>  ----------------------------<  83  4.4   |  26  |  1.55<H>    Ca    9.2      16 Aug 2018 07:51  Phos  3.3     08-15  Mg     1.9     08-15    TPro  7.3  /  Alb  3.3<L>  /  TBili  0.3  /  DBili  x   /  AST  24  /  ALT  23  /  AlkPhos  78  08-14    Gluc: x / Ketone: Negative  / Bili: Negative / Urobili: Negative   Blood: x / Protein: 30 mg/dL / Nitrite: Negative   Leuk Esterase: Negative / RBC: 0-2 /HPF / WBC 0-2 /HPF   Sq Epi: x / Non Sq Epi: x / Bacteria: x      CAPILLARY BLOOD GLUCOSE          RADIOLOGY & ADDITIONAL TESTS: Patient is a 88y old  Male who presents with a chief complaint of dizziness and fall (14 Aug 2018 20:08)      INTERVAL HPI/OVERNIGHT EVENTS:no new complaints    MEDICATIONS  (STANDING):  aspirin  chewable 81 milliGRAM(s) Oral daily  atorvastatin 20 milliGRAM(s) Oral at bedtime  docusate sodium 100 milliGRAM(s) Oral two times a day  finasteride 5 milliGRAM(s) Oral daily  heparin  Injectable 5000 Unit(s) SubCutaneous every 8 hours  levothyroxine 25 MICROGram(s) Oral daily  midodrine 5 milliGRAM(s) Oral three times a day  senna 2 Tablet(s) Oral at bedtime  sodium chloride 0.9%. 1000 milliLiter(s) (60 mL/Hr) IV Continuous <Continuous>    MEDICATIONS  (PRN):      Allergies    No Known Allergies    Intolerances        REVIEW OF SYSTEMS:  CONSTITUTIONAL: No fever, weight loss, or fatigue  RESPIRATORY: No cough, wheezing, chills or hemoptysis; No shortness of breath  CARDIOVASCULAR: No chest pain, palpitations, dizziness, or leg swelling  GASTROINTESTINAL: No abdominal or epigastric pain. No nausea, vomiting, or hematemesis; No diarrhea or constipation. No melena or hematochezia.  NEUROLOGICAL: No headaches, memory loss, loss of strength, numbness, or tremors  SKIN: No itching, burning, rashes, or lesions     Vital Signs Last 24 Hrs  T(C): 37 (16 Aug 2018 11:09), Max: 37.2 (15 Aug 2018 20:33)  T(F): 98.6 (16 Aug 2018 11:09), Max: 99 (15 Aug 2018 20:33)  HR: 78 (16 Aug 2018 11:09) (68 - 79)  BP: 116/64 (16 Aug 2018 11:09) (116/64 - 152/68)  BP(mean): --  RR: 18 (16 Aug 2018 11:09) (18 - 18)  SpO2: 98% (16 Aug 2018 11:09) (97% - 100%)    PHYSICAL EXAM:  GENERAL: NAD,  HEAD:  Atraumatic, Normocephalic  EYES: EOMI, PERRLA, conjunctiva and sclera clear  NECK: Supple, No JVD, Normal thyroid  CHEST/LUNG: Clear to percussion bilaterally; No rales, rhonchi, wheezing, or rubs  HEART: Regular rate and rhythm; No murmurs, rubs, or gallops  ABDOMEN: Soft, Nontender, Nondistended; Bowel sounds present  NERVOUS SYSTEM:  Alert & Oriented X2 , memory loss; Motor Strength 4/5 B/L   EXTREMITIES:  2+ Peripheral Pulses, No clubbing, cyanosis, or edema  SKIN;    LABS:                                   10.5   7.4   )-----------( 170      ( 16 Aug 2018 07:51 )             31.9   08-16    142  |  106  |  43<H>  ----------------------------<  83  4.4   |  26  |  1.55<H>    Ca    9.2      16 Aug 2018 07:51  Phos  3.3     08-15  Mg     1.9     08-15    TPro  7.3  /  Alb  3.3<L>  /  TBili  0.3  /  DBili  x   /  AST  24  /  ALT  23  /  AlkPhos  78  08-14    Gluc: x / Ketone: Negative  / Bili: Negative / Urobili: Negative   Blood: x / Protein: 30 mg/dL / Nitrite: Negative   Leuk Esterase: Negative / RBC: 0-2 /HPF / WBC 0-2 /HPF   Sq Epi: x / Non Sq Epi: x / Bacteria: x      CAPILLARY BLOOD GLUCOSE          RADIOLOGY & ADDITIONAL TESTS:  < from: Transthoracic Echocardiogram (04.11.18 @ 11:05) >  CONCLUSIONS:  1. Mitral annular calcification, and densely calcified  mitral leaflets with normal diastolic opening. Mild mitral  regurgitation.  2. Calcified trileaflet aortic valve with decreased  opening. Peak transaortic valve gradient equals 34.1 mm Hg,  estimated aortic valve area equals 1.1 sqcm (by continuity  equation), consistent with moderate aortic stenosis.  Moderate aortic insufficiency.  AR PHT equals 464 sec.  3. Normal left ventricular internal dimensions and wall  thicknesses.  4. Normal Left Ventricular Systolic Function.  LVEF 65%.  5. Grade I diastolic dysfunction (Impaired relaxation).  6. Normal right ventricular size and function.  7. Normal RV systolic pressure.  8. There is mild tricuspid regurgitation.    < end of copied text >

## 2018-08-16 NOTE — PROGRESS NOTE ADULT - PROBLEM SELECTOR PLAN 3
FANY on CKD stage 3   Likely pre-renal from dehydration and hypotension   Will give IVF ( mild )  f/u bmp FANY on CKD stage 3   Likely pre-renal from dehydration and hypotension   Will give IVF ( mild )  Cr improving   f/u bmp

## 2018-08-16 NOTE — PHYSICAL THERAPY INITIAL EVALUATION ADULT - ACTIVE RANGE OF MOTION EXAMINATION, REHAB EVAL
bilateral  lower extremity Active ROM was WFL (within functional limits)/bilateral upper extremity Active ROM was WFL (within functional limits)/grossly assessed WFL AROM

## 2018-08-16 NOTE — DISCHARGE NOTE ADULT - CARE PLAN
Principal Discharge DX:	Unsteady gait  Goal:	Fall precautions  Assessment and plan of treatment:	You came with unsteady gait. You have history of orthostatic hypotension and autonomic dysfunction. Your Blood pressure is low, likely from dehydration. You were treated with intravenous fluids.You are recommended lifestyle modifications such as sit for 2-3 minutes when you get up from lying position before you start to walk, always walk with help of walker/assisted person,keep yourself hydrated and be compliant with your medications and Follow up with your Primary Care Doctor in 1 week.  Secondary Diagnosis:	FANY (acute kidney injury)  Goal:	increase hydration  Assessment and plan of treatment:	You were diagnosed with FANY , likely from dehydration. Your serum creatinine level improved with intravenous fluids. Avoid taking NSAIDs, keep yourself hydrated and Follow up with your Primary Care Doctor in 1 week for repeat BMP.  Secondary Diagnosis:	Atrial fibrillation  Goal:	Rate control  Assessment and plan of treatment:	You had abnormal rhythm on ECG, most likely Old afibrillation  Secondary Diagnosis:	Diabetes  Secondary Diagnosis:	Hypothyroid Principal Discharge DX:	Unsteady gait  Goal:	Fall precautions  Assessment and plan of treatment:	You came with unsteady gait. You have history of orthostatic hypotension and autonomic dysfunction. Your Blood pressure is low, likely from dehydration. You were treated with intravenous fluids.You are recommended lifestyle modifications such as sit for 2-3 minutes when you get up from lying position before you start to walk, always walk with help of walker/assisted person,keep yourself hydrated and be compliant with your medications and Follow up with your Primary Care Doctor in 1 week.  Secondary Diagnosis:	FANY (acute kidney injury)  Goal:	increase hydration  Assessment and plan of treatment:	You were diagnosed with FANY , likely from dehydration. Your serum creatinine level improved with intravenous fluids. Avoid taking NSAIDs, keep yourself hydrated and Follow up with your Primary Care Doctor in 1 week for repeat BMP.  Secondary Diagnosis:	Atrial fibrillation  Goal:	Rate control  Assessment and plan of treatment:	You had abnormal rhythm on ECG, most likely Afibrillation . keep taking the prescribed medications and Follow up with your Primary Care Doctor in 1 week. Pacemaker( [employer]) interrogation is done and it is functioning normally  Secondary Diagnosis:	Diabetes  Goal:	Hba1 c<7%  Assessment and plan of treatment:	You have history of Diabetes. You HbA1c is 6.1% . keep taking the prescribed medications, low carbohydrate diet and Follow up with your Primary Care Doctor in 4 weeks  Secondary Diagnosis:	Hypothyroid  Goal:	symptom control  Assessment and plan of treatment:	You have history of Hypothroidism . keep taking the prescribed medication and Follow up with your Primary Care Doctor in 4 weeks Principal Discharge DX:	Unsteady gait  Goal:	Fall precautions  Assessment and plan of treatment:	You came with unsteady gait. You have history of orthostatic hypotension and autonomic dysfunction. Your Blood pressure is low, likely from dehydration. You were treated with intravenous fluids. You are recommended lifestyle modifications such as sit for 2-3 minutes when you get up from lying position before you start to walk, always walk with help of walker/assisted person, keep yourself hydrated and be compliant with your medications and Follow up with your Primary Care Doctor in 1 week.  Secondary Diagnosis:	FANY (acute kidney injury)  Goal:	increase hydration  Assessment and plan of treatment:	You were diagnosed with FANY , likely from dehydration. Your serum creatinine level improved with intravenous fluids. Avoid taking NSAIDs, keep yourself hydrated and Follow up with your Primary Care Doctor in 1 week for repeat BMP.  Secondary Diagnosis:	Atrial fibrillation  Goal:	Rate control  Assessment and plan of treatment:	You had abnormal rhythm on ECG, most likely Atrial fibrillation . keep taking the prescribed medications and Follow up with your Primary Care Doctor in 1 week. Pacemaker( [employer]) interrogation is done and it is functioning normally  Secondary Diagnosis:	Diabetes  Goal:	Hba1 c<7%  Assessment and plan of treatment:	You have history of Diabetes. You HbA1c is 6.1% . keep taking the prescribed medications, low carbohydrate diet and Follow up with your Primary Care Doctor in 4 weeks  Secondary Diagnosis:	Hypothyroid  Goal:	symptom control  Assessment and plan of treatment:	You have history of Hypothyroidism . keep taking the prescribed medication and Follow up with your Primary Care Doctor in 4 weeks

## 2018-08-16 NOTE — DISCHARGE NOTE ADULT - PLAN OF CARE
Fall precautions You came with unsteady gait. You have history of orthostatic hypotension and autonomic dysfunction. Your Blood pressure is low, likely from dehydration. You were treated with intravenous fluids.You are recommended lifestyle modifications such as sit for 2-3 minutes when you get up from lying position before you start to walk, always walk with help of walker/assisted person,keep yourself hydrated and be compliant with your medications and Follow up with your Primary Care Doctor in 1 week. increase hydration You were diagnosed with FANY , likely from dehydration. Your serum creatinine level improved with intravenous fluids. Avoid taking NSAIDs, keep yourself hydrated and Follow up with your Primary Care Doctor in 1 week for repeat BMP. Rate control You had abnormal rhythm on ECG, most likely Old afibrillation You had abnormal rhythm on ECG, most likely Afibrillation . keep taking the prescribed medications and Follow up with your Primary Care Doctor in 1 week. Pacemaker( [employer]) interrogation is done and it is functioning normally Hba1 c<7% You have history of Diabetes. You HbA1c is 6.1% . keep taking the prescribed medications, low carbohydrate diet and Follow up with your Primary Care Doctor in 4 weeks symptom control You have history of Hypothroidism . keep taking the prescribed medication and Follow up with your Primary Care Doctor in 4 weeks You came with unsteady gait. You have history of orthostatic hypotension and autonomic dysfunction. Your Blood pressure is low, likely from dehydration. You were treated with intravenous fluids. You are recommended lifestyle modifications such as sit for 2-3 minutes when you get up from lying position before you start to walk, always walk with help of walker/assisted person, keep yourself hydrated and be compliant with your medications and Follow up with your Primary Care Doctor in 1 week. You had abnormal rhythm on ECG, most likely Atrial fibrillation . keep taking the prescribed medications and Follow up with your Primary Care Doctor in 1 week. Pacemaker( [employer]) interrogation is done and it is functioning normally You have history of Hypothyroidism . keep taking the prescribed medication and Follow up with your Primary Care Doctor in 4 weeks

## 2018-08-16 NOTE — PROGRESS NOTE ADULT - ASSESSMENT
89 yo male with PMH of HTN , HLD , BPH , Hypothyroidism , DJD , CKD stage 3 , Autonomic dysfunction and Orthostatic hypotension ( admitted to [facility] in 2017 , discharged with PPM and started on Midodrine ) , comes in after h/o multiple falls. Patient reports of dizziness , almost like a room spinning sensation . Had a fall yesterday , where he hit his head , remembers the whole event. Patient did not sustain any other injuries. Denies any chest pain , sob , headache , loss of consciousness. Based on outpatient charts , patient has been following up with Cardio , neuro and PMD and has had multiple falls in last 1 year.     In Ed , BP : 113/ 71 mm hg , HR : 84 , Temp : 97. 6 F  cbc shows hb of 11.6 ( at baseline )  bmp shows K of 5.5 with K of 5.2 ; creatinine was 2.35 which improved to 2.26   Tsh wnl   T1 negative   Ct head wnl   Xray pelvis and CXR wnl   EKG shows Atrial fibrillation , no St- t wave changes     Will admit to telemetry for Multiple falls , new onset Atrial fibrillation. 87 yo male with PMH of HTN , HLD , BPH , Hypothyroidism , DJD , CKD stage 3 , Autonomic dysfunction and Orthostatic hypotension ( admitted to [facility] in 2017 , discharged with PPM and started on Midodrine ) , comes in after h/o multiple falls. Patient reports of dizziness , almost like a room spinning sensation . Had a fall yesterday , where he hit his head , remembers the whole event. Patient did not sustain any other injuries. Denies any chest pain , sob , headache , loss of consciousness. Based on outpatient charts , patient has been following up with Cardio , neuro and PMD and has had multiple falls in last 1 year.     In Ed , BP : 113/ 71 mm hg , HR : 84 , Temp : 97. 6 F  cbc shows hb of 11.6 ( at baseline )  bmp shows K of 5.5 with K of 5.2 ; creatinine was 2.35 which improved to 2.26   Tsh wnl   T1 negative   Ct head wnl   Xray pelvis and CXR wnl   EKG shows Atrial fibrillation , no St- t wave changes     admitted to telemetry for Multiple falls , new onset Atrial fibrillation.   Pt assessed at bedside . No new complaints

## 2018-08-16 NOTE — PROGRESS NOTE ADULT - PROBLEM SELECTOR PLAN 1
Patient presents with h/o frequent falls from Orthostatic hypotension and autonomic dysfunction  Patient was on midodrine and pyridostigmine in past , now only on midodrine   Based on outpatient charts , patient was considered to be started on fludrocortisone for possible adrenal insufficiency   Primary team to follow up for the same   Orthostatic vitals recorded by myself in ER:  lying down : 117/60 mm hg   Sitting up : 124/70 mm hg (cannot stand at the moment )  Will monitor Orthostatic q12   Continue midodrine and gentle hydration   will rule out causes of PN like b12 , d3 , folate , rpr   PT consult   Fall precautions   Ct head wnl   Xay pelvis wnl  Needs Pacemaker interrogation Patient presents with h/o frequent falls from Orthostatic hypotension and autonomic dysfunction  Orthostatic vitals:  lying down : 117/60 mm hg   Sitting up : 124/70 mm hg (cannot stand at the moment )   Continue midodrine and gentle hydration   Vit 12, vit D WNL.    f/u PT consult   Fall precautions   Ct head wnl   Xay pelvis wnl   Pacemaker interrogation Patient presents with h/o frequent falls from Orthostatic hypotension and autonomic dysfunction  Orthostatic vitals   lying down : 117/60 mm hg   Sitting up : 124/70 mm hg (cannot stand at the moment )   Continue midodrine and gentle hydration   Vit 12, vit D WNL.    f/u PT consult   Fall precautions   Ct head wnl   Xay pelvis wnl   Pacemaker interrogation done Patient presents with h/o frequent falls from Orthostatic hypotension and autonomic dysfunction  Orthostatic vitals   lying down : 117/60 mm hg   Sitting up : 124/70 mm hg (cannot stand at the moment )   Continue midodrine and gentle hydration   Vit 12, vit D WNL.     PT - [facility]   Fall precautions   Ct head wnl   Xay pelvis wnl   Pacemaker interrogation done Patient presents with h/o frequent falls from Orthostatic hypotension and autonomic dysfunction  Orthostatic vitals reviewed.  lying down : 117/60 mm hg   Sitting up : 124/70 mm hg (cannot stand at the moment )   Continue midodrine and gentle hydration   Vit 12, vit D WNL.     PT - [facility]   Fall precautions   Ct head wnl   Xay pelvis wnl   Pacemaker interrogation done

## 2018-08-16 NOTE — PROGRESS NOTE ADULT - PROBLEM SELECTOR PLAN 4
Continue with Synthyroid   f/u tsh Continue with Synthyroid   tsh wnl Continue with Synthroid   TSH wnl

## 2018-08-16 NOTE — DISCHARGE NOTE ADULT - CARE PROVIDER_API CALL
Chayito Foster (DO), Internal Medicine  [address]  [address] [place] [address]  Phone: [phone]  Fax: [phone]

## 2018-08-17 LAB
ANION GAP SERPL CALC-SCNC: 9 MMOL/L — SIGNIFICANT CHANGE UP (ref 5–17)
BUN SERPL-MCNC: 38 MG/DL — HIGH (ref 7–18)
CALCIUM SERPL-MCNC: 9 MG/DL — SIGNIFICANT CHANGE UP (ref 8.4–10.5)
CHLORIDE SERPL-SCNC: 109 MMOL/L — HIGH (ref 96–108)
CO2 SERPL-SCNC: 26 MMOL/L — SIGNIFICANT CHANGE UP (ref 22–31)
CREAT SERPL-MCNC: 1.49 MG/DL — HIGH (ref 0.5–1.3)
GLUCOSE SERPL-MCNC: 90 MG/DL — SIGNIFICANT CHANGE UP (ref 70–99)
POTASSIUM SERPL-MCNC: 4.2 MMOL/L — SIGNIFICANT CHANGE UP (ref 3.5–5.3)
POTASSIUM SERPL-SCNC: 4.2 MMOL/L — SIGNIFICANT CHANGE UP (ref 3.5–5.3)
SODIUM SERPL-SCNC: 144 MMOL/L — SIGNIFICANT CHANGE UP (ref 135–145)

## 2018-08-17 PROCEDURE — 99232 SBSQ HOSP IP/OBS MODERATE 35: CPT | Mod: GC

## 2018-08-17 RX ADMIN — Medication 100 MILLIGRAM(S): at 05:29

## 2018-08-17 RX ADMIN — HEPARIN SODIUM 5000 UNIT(S): 5000 INJECTION INTRAVENOUS; SUBCUTANEOUS at 05:30

## 2018-08-17 RX ADMIN — HEPARIN SODIUM 5000 UNIT(S): 5000 INJECTION INTRAVENOUS; SUBCUTANEOUS at 21:27

## 2018-08-17 RX ADMIN — MIDODRINE HYDROCHLORIDE 5 MILLIGRAM(S): 2.5 TABLET ORAL at 21:27

## 2018-08-17 RX ADMIN — Medication 25 MICROGRAM(S): at 05:29

## 2018-08-17 RX ADMIN — SENNA PLUS 2 TABLET(S): 8.6 TABLET ORAL at 21:27

## 2018-08-17 RX ADMIN — ATORVASTATIN CALCIUM 20 MILLIGRAM(S): 80 TABLET, FILM COATED ORAL at 21:27

## 2018-08-17 RX ADMIN — MIDODRINE HYDROCHLORIDE 5 MILLIGRAM(S): 2.5 TABLET ORAL at 13:17

## 2018-08-17 RX ADMIN — MIDODRINE HYDROCHLORIDE 5 MILLIGRAM(S): 2.5 TABLET ORAL at 05:29

## 2018-08-17 RX ADMIN — Medication 100 MILLIGRAM(S): at 17:11

## 2018-08-17 RX ADMIN — Medication 81 MILLIGRAM(S): at 12:09

## 2018-08-17 RX ADMIN — HEPARIN SODIUM 5000 UNIT(S): 5000 INJECTION INTRAVENOUS; SUBCUTANEOUS at 13:17

## 2018-08-17 RX ADMIN — FINASTERIDE 5 MILLIGRAM(S): 5 TABLET, FILM COATED ORAL at 12:09

## 2018-08-17 NOTE — PROGRESS NOTE ADULT - PROBLEM SELECTOR PLAN 2
likely old Afib  Trop -ve  TSH wnl  Echocardiogram eF 65% ( 4/2018)  PPM interrogation done   CHADS Vasc is 1 ( age ) ,

## 2018-08-17 NOTE — PROGRESS NOTE ADULT - ASSESSMENT
87 yo male with PMH of HTN , HLD , BPH , Hypothyroidism , DJD , CKD stage 3 , Autonomic dysfunction and Orthostatic hypotension ( admitted to [facility] in 2017 , discharged with PPM and started on Midodrine ) , comes in after h/o multiple falls. Patient reports of dizziness , almost like a room spinning sensation . Had a fall yesterday , where he hit his head , remembers the whole event. Patient did not sustain any other injuries. Denies any chest pain , sob , headache , loss of consciousness. Based on outpatient charts , patient has been following up with Cardio , neuro and PMD and has had multiple falls in last 1 year.     In Ed , BP : 113/ 71 mm hg , HR : 84 , Temp : 97. 6 F  cbc shows hb of 11.6 ( at baseline )  bmp shows K of 5.5 with K of 5.2 ; creatinine was 2.35 which improved to 2.26   Tsh wnl   T1 negative   Ct head wnl   Xray pelvis and CXR wnl   EKG shows Atrial fibrillation , no St- t wave changes     admitted to telemetry for Multiple falls , new onset Atrial fibrillation.   Pt assessed at bedside . No new complaints. waiting for placement in [facility]

## 2018-08-17 NOTE — PROGRESS NOTE ADULT - SUBJECTIVE AND OBJECTIVE BOX
Patient is a 88y old  Male who presents with a chief complaint of dizziness and fall (16 Aug 2018 18:15)      INTERVAL HPI/OVERNIGHT EVENTS: no overnight events    MEDICATIONS  (STANDING):  aspirin  chewable 81 milliGRAM(s) Oral daily  atorvastatin 20 milliGRAM(s) Oral at bedtime  docusate sodium 100 milliGRAM(s) Oral two times a day  finasteride 5 milliGRAM(s) Oral daily  heparin  Injectable 5000 Unit(s) SubCutaneous every 8 hours  levothyroxine 25 MICROGram(s) Oral daily  midodrine 5 milliGRAM(s) Oral three times a day  senna 2 Tablet(s) Oral at bedtime  sodium chloride 0.9%. 1000 milliLiter(s) (60 mL/Hr) IV Continuous <Continuous>    MEDICATIONS  (PRN):      Allergies    No Known Allergies    Intolerances        REVIEW OF SYSTEMS:  CONSTITUTIONAL: No fever, weight loss, or fatigue  RESPIRATORY: No cough, wheezing, chills or hemoptysis; No shortness of breath  CARDIOVASCULAR: No chest pain, palpitations, dizziness, or leg swelling  GASTROINTESTINAL: No abdominal or epigastric pain. No nausea, vomiting, or hematemesis; No diarrhea or constipation. No melena or hematochezia.  NEUROLOGICAL: No headaches, memory loss, loss of strength, numbness, or tremors  SKIN: No itching, burning, rashes, or lesions     Vital Signs Last 24 Hrs  T(C): 37.1 (17 Aug 2018 11:10), Max: 37.1 (17 Aug 2018 11:10)  T(F): 98.7 (17 Aug 2018 11:10), Max: 98.7 (17 Aug 2018 11:10)  HR: 64 (17 Aug 2018 11:10) (64 - 83)  BP: 125/42 (17 Aug 2018 11:10) (125/42 - 153/51)  BP(mean): --  RR: 18 (17 Aug 2018 11:10) (17 - 18)  SpO2: 98% (17 Aug 2018 11:10) (94% - 100%)    PHYSICAL EXAM:  GENERAL: NAD,  HEAD:  Atraumatic, Normocephalic  EYES: EOMI, PERRLA, conjunctiva and sclera clear  NECK: Supple, No JVD, Normal thyroid  CHEST/LUNG: Clear to percussion bilaterally; No rales, rhonchi, wheezing, or rubs  HEART: Regular rate and rhythm; No murmurs, rubs, or gallops  ABDOMEN: Soft, Nontender, Nondistended; Bowel sounds present  NERVOUS SYSTEM:  Alert & Oriented X3, Good concentration; Motor Strength 5/5 B/L   EXTREMITIES:  2+ Peripheral Pulses, No clubbing, cyanosis, or edema  SKIN;    LABS:                        10.5   7.4   )-----------( 170      ( 16 Aug 2018 07:51 )             31.9     08-17    144  |  109<H>  |  38<H>  ----------------------------<  90  4.2   |  26  |  1.49<H>    Ca    9.0      17 Aug 2018 07:07          CAPILLARY BLOOD GLUCOSE      POCT Blood Glucose.: 225 mg/dL (17 Aug 2018 11:41)  POCT Blood Glucose.: 93 mg/dL (17 Aug 2018 07:55)      RADIOLOGY & ADDITIONAL TESTS:

## 2018-08-17 NOTE — PROGRESS NOTE ADULT - PROBLEM SELECTOR PLAN 1
Patient presents with h/o frequent falls from Orthostatic hypotension and autonomic dysfunction  Orthostatic vitals reviewed.   Continue midodrine and gentle hydration   Vit 12, vit D WNL.     PT - PEPE   Fall precautions   Ct head wnl   Xay pelvis wnl   Pacemaker interrogation done: working fine , need some adjustments, technician will come today

## 2018-08-18 PROCEDURE — 99232 SBSQ HOSP IP/OBS MODERATE 35: CPT | Mod: GC

## 2018-08-18 RX ADMIN — Medication 100 MILLIGRAM(S): at 17:24

## 2018-08-18 RX ADMIN — Medication 100 MILLIGRAM(S): at 05:07

## 2018-08-18 RX ADMIN — Medication 25 MICROGRAM(S): at 05:07

## 2018-08-18 RX ADMIN — ATORVASTATIN CALCIUM 20 MILLIGRAM(S): 80 TABLET, FILM COATED ORAL at 21:19

## 2018-08-18 RX ADMIN — MIDODRINE HYDROCHLORIDE 5 MILLIGRAM(S): 2.5 TABLET ORAL at 13:07

## 2018-08-18 RX ADMIN — HEPARIN SODIUM 5000 UNIT(S): 5000 INJECTION INTRAVENOUS; SUBCUTANEOUS at 05:07

## 2018-08-18 RX ADMIN — HEPARIN SODIUM 5000 UNIT(S): 5000 INJECTION INTRAVENOUS; SUBCUTANEOUS at 21:20

## 2018-08-18 RX ADMIN — FINASTERIDE 5 MILLIGRAM(S): 5 TABLET, FILM COATED ORAL at 13:07

## 2018-08-18 RX ADMIN — Medication 81 MILLIGRAM(S): at 13:07

## 2018-08-18 RX ADMIN — HEPARIN SODIUM 5000 UNIT(S): 5000 INJECTION INTRAVENOUS; SUBCUTANEOUS at 13:07

## 2018-08-18 RX ADMIN — SENNA PLUS 2 TABLET(S): 8.6 TABLET ORAL at 21:20

## 2018-08-18 NOTE — PROGRESS NOTE ADULT - SUBJECTIVE AND OBJECTIVE BOX
Patient is a 88y old  Male who presents with a chief complaint of dizziness and fall (16 Aug 2018 18:15)      INTERVAL HPI/OVERNIGHT EVENTS: no overnight events    MEDICATIONS  (STANDING):  aspirin  chewable 81 milliGRAM(s) Oral daily  atorvastatin 20 milliGRAM(s) Oral at bedtime  docusate sodium 100 milliGRAM(s) Oral two times a day  finasteride 5 milliGRAM(s) Oral daily  heparin  Injectable 5000 Unit(s) SubCutaneous every 8 hours  levothyroxine 25 MICROGram(s) Oral daily  midodrine 5 milliGRAM(s) Oral three times a day  senna 2 Tablet(s) Oral at bedtime  sodium chloride 0.9%. 1000 milliLiter(s) (60 mL/Hr) IV Continuous <Continuous>    MEDICATIONS  (PRN):  Allergies    No Known Allergies    Intolerances        REVIEW OF SYSTEMS:  CONSTITUTIONAL: No fever, weight loss, or fatigue  RESPIRATORY: No cough, wheezing, chills or hemoptysis; No shortness of breath  CARDIOVASCULAR: No chest pain, palpitations, dizziness, or leg swelling  GASTROINTESTINAL: No abdominal or epigastric pain. No nausea, vomiting, or hematemesis; No diarrhea or constipation. No melena or hematochezia.  NEUROLOGICAL: No headaches, memory loss, loss of strength, numbness, or tremors  SKIN: No itching, burning, rashes, or lesions     Vital Signs Last 24 Hrs  T(C): 36.2 (18 Aug 2018 13:08), Max: 36.9 (18 Aug 2018 08:25)  T(F): 97.2 (18 Aug 2018 13:08), Max: 98.4 (18 Aug 2018 08:25)  HR: 72 (18 Aug 2018 13:08) (59 - 72)  BP: 140/59 (18 Aug 2018 13:08) (133/53 - 171/57)  BP(mean): --  RR: 16 (18 Aug 2018 13:08) (16 - 18)  SpO2: 100% (18 Aug 2018 13:08) (96% - 100%)    PHYSICAL EXAM:  GENERAL: NAD,  HEAD:  Atraumatic, Normocephalic  EYES: EOMI, PERRLA, conjunctiva and sclera clear  NECK: Supple, No JVD, Normal thyroid  CHEST/LUNG: Clear to percussion bilaterally; No rales, rhonchi, wheezing, or rubs  HEART: Regular rate and rhythm; No murmurs, rubs, or gallops  ABDOMEN: Soft, Nontender, Nondistended; Bowel sounds present  NERVOUS SYSTEM:  Alert & Oriented X3, Good concentration; Motor Strength 5/5 B/L   EXTREMITIES:  2+ Peripheral Pulses, No clubbing, cyanosis, or edema  SKIN;    LABS:                        10.5   7.4   )-----------( 170      ( 16 Aug 2018 07:51 )             31.9     08-17    144  |  109<H>  |  38<H>  ----------------------------<  90  4.2   |  26  |  1.49<H>    Ca    9.0      17 Aug 2018 07:07          CAPILLARY BLOOD GLUCOSE      POCT Blood Glucose.: 121 mg/dL (18 Aug 2018 11:56)  POCT Blood Glucose.: 92 mg/dL (18 Aug 2018 08:13)  POCT Blood Glucose.: 99 mg/dL (17 Aug 2018 21:45)  POCT Blood Glucose.: 96 mg/dL (17 Aug 2018 16:37)    RADIOLOGY & ADDITIONAL TESTS: Patient is a 88y old  Male who presents with a chief complaint of dizziness and fall (16 Aug 2018 18:15)      INTERVAL HPI/OVERNIGHT EVENTS: no overnight events    MEDICATIONS  (STANDING):  aspirin  chewable 81 milliGRAM(s) Oral daily  atorvastatin 20 milliGRAM(s) Oral at bedtime  docusate sodium 100 milliGRAM(s) Oral two times a day  finasteride 5 milliGRAM(s) Oral daily  heparin  Injectable 5000 Unit(s) SubCutaneous every 8 hours  levothyroxine 25 MICROGram(s) Oral daily  midodrine 5 milliGRAM(s) Oral three times a day  senna 2 Tablet(s) Oral at bedtime  sodium chloride 0.9%. 1000 milliLiter(s) (60 mL/Hr) IV Continuous <Continuous>    MEDICATIONS  (PRN):  Allergies    No Known Allergies    Intolerances      REVIEW OF SYSTEMS:  CONSTITUTIONAL: No fever, weight loss, or fatigue  RESPIRATORY: No cough, wheezing, chills or hemoptysis; No shortness of breath  CARDIOVASCULAR: No chest pain, palpitations, dizziness, or leg swelling  GASTROINTESTINAL: No abdominal or epigastric pain. No nausea, vomiting, or hematemesis; No diarrhea or constipation. No melena or hematochezia.  NEUROLOGICAL: No headaches, memory loss, loss of strength, numbness, or tremors  SKIN: No itching, burning, rashes, or lesions     Vital Signs Last 24 Hrs  T(C): 36.2 (18 Aug 2018 13:08), Max: 36.9 (18 Aug 2018 08:25)  T(F): 97.2 (18 Aug 2018 13:08), Max: 98.4 (18 Aug 2018 08:25)  HR: 72 (18 Aug 2018 13:08) (59 - 72)  BP: 140/59 (18 Aug 2018 13:08) (133/53 - 171/57)  BP(mean): --  RR: 16 (18 Aug 2018 13:08) (16 - 18)  SpO2: 100% (18 Aug 2018 13:08) (96% - 100%)    PHYSICAL EXAM:    GENERAL: NAD,  HEAD:  Atraumatic, Normocephalic  EYES: EOMI, PERRLA, conjunctiva and sclera clear  NECK: Supple, No JVD, Normal thyroid  CHEST/LUNG: Clear to percussion bilaterally  HEART: Regular rate and rhythm  ABDOMEN: Soft, Nontender, Nondistended; Bowel sounds present  NERVOUS SYSTEM:  Alert & Oriented X3, Good concentration; Motor Strength 5/5 B/L   EXTREMITIES:  2+ Peripheral Pulses, No clubbing, cyanosis, or edema      LABS:                        10.5   7.4   )-----------( 170      ( 16 Aug 2018 07:51 )             31.9     08-17    144  |  109<H>  |  38<H>  ----------------------------<  90  4.2   |  26  |  1.49<H>    Ca    9.0      17 Aug 2018 07:07      CAPILLARY BLOOD GLUCOSE      POCT Blood Glucose.: 121 mg/dL (18 Aug 2018 11:56)  POCT Blood Glucose.: 92 mg/dL (18 Aug 2018 08:13)  POCT Blood Glucose.: 99 mg/dL (17 Aug 2018 21:45)  POCT Blood Glucose.: 96 mg/dL (17 Aug 2018 16:37)    RADIOLOGY & ADDITIONAL TESTS:

## 2018-08-18 NOTE — PROGRESS NOTE ADULT - PROBLEM SELECTOR PLAN 2
likely old Afib  Trop -ve  TSH wnl  Echocardiogram eF 65% ( 4/2018)  PPM interrogation done   CHADS Vasc is 1 ( age ) , chronic AFIB   Not candidate for anticoagulation due to recurrent falls and high risk of complication with anticoagulation  Echocardiogram eF 65% ( 4/2018)  CHADS Vasc is 1 ( age ) ,

## 2018-08-18 NOTE — PROGRESS NOTE ADULT - PROBLEM SELECTOR PLAN 1
Patient presents with h/o frequent falls from Orthostatic hypotension and autonomic dysfunction  Orthostatic vitals reviewed.   Continue midodrine and gentle hydration   Vit 12, vit D WNL.     PT - PEPE   Fall precautions   Ct head wnl   Xay pelvis wnl   Pacemaker interrogation done: working fine , Continue midodrine and gentle hydration   awaiting rehab   Maintain Fall precautions    Pacemaker interrogation done- working fine

## 2018-08-18 NOTE — PROGRESS NOTE ADULT - ASSESSMENT
89 yo male with PMH of HTN , HLD , BPH , Hypothyroidism , DJD , CKD stage 3 , Autonomic dysfunction and Orthostatic hypotension ( [facility] , discharged with PPM and started on Midodrine ) , comes in after h/o multiple falls. Patient reports of dizziness , almost like a room spinning sensation . Had a fall yesterday , where he hit his head , remembers the whole event. Patient did not sustain any other injuries. Denies any chest pain , sob , headache , loss of consciousness. Based on outpatient charts , patient has been following up with Cardio , neuro and PMD and has had multiple falls in last 1 year.     In Ed , BP : 113/ 71 mm hg , HR : 84 , Temp : 97. 6 F  cbc shows hb of 11.6 ( at baseline )  bmp shows K of 5.5 with K of 5.2 ; creatinine was 2.35 which improved to 2.26   Tsh wnl   T1 negative   Ct head wnl   Xray pelvis and CXR wnl   EKG shows Atrial fibrillation , no St- t wave changes     admitted to telemetry for Multiple falls , new onset Atrial fibrillation.   Pt assessed at bedside . No new complaints. waiting for placement in [place] on monday 87 yo male with PMH of HTN , HLD , BPH , Hypothyroidism , DJD , CKD stage 3 , Autonomic dysfunction and Orthostatic hypotension ( admitted to [facility] in 2017 , discharged with PPM and started on Midodrine ) , comes in after h/o multiple falls. Patient reports of dizziness , almost like a room spinning sensation .   Admitted for s/p  fall     admitted to telemetry for Multiple falls , new onset Atrial fibrillation.   Pt assessed at bedside . No new complaints. Waiting for placement in [facility] [place],

## 2018-08-19 PROCEDURE — 99233 SBSQ HOSP IP/OBS HIGH 50: CPT | Mod: GC

## 2018-08-19 RX ORDER — SODIUM CHLORIDE 9 MG/ML
1000 INJECTION INTRAMUSCULAR; INTRAVENOUS; SUBCUTANEOUS
Qty: 0 | Refills: 0 | Status: DISCONTINUED | OUTPATIENT
Start: 2018-08-19 | End: 2018-08-21

## 2018-08-19 RX ADMIN — Medication 81 MILLIGRAM(S): at 11:30

## 2018-08-19 RX ADMIN — MIDODRINE HYDROCHLORIDE 5 MILLIGRAM(S): 2.5 TABLET ORAL at 08:21

## 2018-08-19 RX ADMIN — Medication 25 MICROGRAM(S): at 08:21

## 2018-08-19 RX ADMIN — ATORVASTATIN CALCIUM 20 MILLIGRAM(S): 80 TABLET, FILM COATED ORAL at 21:27

## 2018-08-19 RX ADMIN — SENNA PLUS 2 TABLET(S): 8.6 TABLET ORAL at 21:27

## 2018-08-19 RX ADMIN — Medication 100 MILLIGRAM(S): at 08:21

## 2018-08-19 RX ADMIN — MIDODRINE HYDROCHLORIDE 5 MILLIGRAM(S): 2.5 TABLET ORAL at 21:28

## 2018-08-19 RX ADMIN — Medication 100 MILLIGRAM(S): at 17:00

## 2018-08-19 RX ADMIN — HEPARIN SODIUM 5000 UNIT(S): 5000 INJECTION INTRAVENOUS; SUBCUTANEOUS at 21:28

## 2018-08-19 RX ADMIN — FINASTERIDE 5 MILLIGRAM(S): 5 TABLET, FILM COATED ORAL at 11:30

## 2018-08-19 RX ADMIN — SODIUM CHLORIDE 60 MILLILITER(S): 9 INJECTION INTRAMUSCULAR; INTRAVENOUS; SUBCUTANEOUS at 17:00

## 2018-08-19 RX ADMIN — HEPARIN SODIUM 5000 UNIT(S): 5000 INJECTION INTRAVENOUS; SUBCUTANEOUS at 06:21

## 2018-08-19 RX ADMIN — HEPARIN SODIUM 5000 UNIT(S): 5000 INJECTION INTRAVENOUS; SUBCUTANEOUS at 13:42

## 2018-08-19 NOTE — PROGRESS NOTE ADULT - PROBLEM SELECTOR PLAN 1
most likely secondary to orthostatic positive.  repeat orthostasis today, continue midodrine.  last orthostatic: 8/16, increase HR from 64 to 84,   A1c: 6, b12, TSH wnl  ? autonomic neuropathy secondary to underlying diabetes.   PT evaluation appreciated, pending rehab placement.

## 2018-08-19 NOTE — PROGRESS NOTE ADULT - ASSESSMENT
HPI:  89 yo male with PMH of HTN , HLD , BPH , Hypothyroidism , DJD , CKD stage 3 , Autonomic dysfunction and Orthostatic hypotension ( admitted to [facility] in 2017 , discharged with PPM and started on Midodrine ) , comes in after h/o multiple falls. Patient reports of dizziness , almost like a room spinning sensation . Had a fall yesterday , where he hit his head , remembers the whole event. Patient did not sustain any other injuries. Denies any chest pain , sob , headache , loss of consciousness. Based on outpatient charts , patient has been following up with Cardio , neuro and PMD and has had multiple falls in last 1 year.     In Ed , BP : 113/ 71 mm hg , HR : 84 , Temp : 97. 6 F  cbc shows hb of 11.6 ( at baseline )  bmp shows K of 5.5 with K of 5.2 ; creatinine was 2.35 which improved to 2.26   Tsh wnl   T1 negative   Ct head wnl   Xray pelvis and CXR wnl (14 Aug 2018 20:08)

## 2018-08-19 NOTE — PROGRESS NOTE ADULT - SUBJECTIVE AND OBJECTIVE BOX
Patient is a 88y old  Male who presents with a chief complaint of dizziness and fall (16 Aug 2018 18:15)    INTERVAL HPI/OVERNIGHT EVENTS:  Patient was seen and examined at bedside, feeling well  He is stating that he has multiple falls whenever he is walking.    Allergies  No Known Allergies  Intolerances    REVIEW OF SYSTEMS:  CONSTITUTIONAL: No fever, weight loss, or fatigue  EYES: No eye pain, visual disturbances, or discharge  RESPIRATORY: No cough, wheezing or shortness of breath  CARDIOVASCULAR: No chest pain, feeling of heart beats  GASTROINTESTINAL: No abdominal or epigastric pain. No nausea, vomiting; No diarrhea or constipation.  GENITOURINARY: No dysuria, frequency, hematuria  NEUROLOGICAL: No headaches  MUSCULOSKELETAL: No joint pain  PSYCHIATRIC: No depression or anxiety  HEME/LYMPH: No easy bruising, or bleeding gums  ALLERGY AND IMMUNOLOGIC: No hives or eczema    Medications:  aspirin  chewable 81 milliGRAM(s) Oral daily  atorvastatin 20 milliGRAM(s) Oral at bedtime  docusate sodium 100 milliGRAM(s) Oral two times a day  finasteride 5 milliGRAM(s) Oral daily  heparin  Injectable 5000 Unit(s) SubCutaneous every 8 hours  levothyroxine 25 MICROGram(s) Oral daily  midodrine 5 milliGRAM(s) Oral three times a day  senna 2 Tablet(s) Oral at bedtime  sodium chloride 0.9%. 1000 milliLiter(s) IV Continuous <Continuous>      PHYSICAL EXAM:  Vital Signs Last 24 Hrs  T(C): 36.8 (19 Aug 2018 12:37), Max: 36.9 (18 Aug 2018 19:52)  T(F): 98.3 (19 Aug 2018 12:37), Max: 98.4 (18 Aug 2018 19:52)  HR: 72 (19 Aug 2018 12:37) (53 - 72)  BP: 121/55 (19 Aug 2018 12:37) (103/57 - 169/58)  BP(mean): --  RR: 17 (19 Aug 2018 12:37) (17 - 18)  SpO2: 98% (19 Aug 2018 12:37) (96% - 99%)    GENERAL: NAD  HEAD:  Atraumatic, Normocephalic  EYES: EOMI, PERRLA, conjunctiva and sclera clear  ENT: moist mucous membranes  NECK: Supple, No JVD, Normal thyroid  NERVOUS SYSTEM:  Alert & Oriented X3, Good concentration;   CHEST/LUNG: No rales, rhonchi, wheezing, or rubs  HEART: Regular rate and rhythm; No murmurs, rubs, or gallops  ABDOMEN: Soft, Nontender, Nondistended; Bowel sounds present  EXTREMITIES:  2+ Peripheral Pulses, No clubbing, cyanosis, or edema  scap of a prior fall on the right knee.   SKIN: No rashes or lesions    LABS:    Culture & Sensitivity:   CAPILLARY BLOOD GLUCOSE    POCT Blood Glucose.: 154 mg/dL (19 Aug 2018 11:46)  POCT Blood Glucose.: 91 mg/dL (19 Aug 2018 08:22)  POCT Blood Glucose.: 119 mg/dL (18 Aug 2018 21:19)  POCT Blood Glucose.: 96 mg/dL (18 Aug 2018 16:34)      08-18 @ 07:01  -  08-19 @ 07:00  --------------------------------------------------------  IN: 250 mL / OUT: 300 mL / NET: -50 mL        RADIOLOGY & ADDITIONAL TESTS:  Radiology testing independently reviewed:     Consultant(s) Notes Reviewed:  [ x] YES  [ ] NO    Care Discussed with Consultants/Other Providers [ x] YES  [ ] NO

## 2018-08-20 ENCOUNTER — APPOINTMENT (OUTPATIENT)
Dept: INTERNAL MEDICINE | Facility: CLINIC | Age: 83
End: 2018-08-20

## 2018-08-20 PROCEDURE — 99232 SBSQ HOSP IP/OBS MODERATE 35: CPT | Mod: GC

## 2018-08-20 RX ORDER — SODIUM CHLORIDE 9 MG/ML
1000 INJECTION INTRAMUSCULAR; INTRAVENOUS; SUBCUTANEOUS
Qty: 0 | Refills: 0 | Status: DISCONTINUED | OUTPATIENT
Start: 2018-08-20 | End: 2018-08-21

## 2018-08-20 RX ADMIN — HEPARIN SODIUM 5000 UNIT(S): 5000 INJECTION INTRAVENOUS; SUBCUTANEOUS at 21:56

## 2018-08-20 RX ADMIN — ATORVASTATIN CALCIUM 20 MILLIGRAM(S): 80 TABLET, FILM COATED ORAL at 21:55

## 2018-08-20 RX ADMIN — Medication 100 MILLIGRAM(S): at 17:37

## 2018-08-20 RX ADMIN — MIDODRINE HYDROCHLORIDE 5 MILLIGRAM(S): 2.5 TABLET ORAL at 21:55

## 2018-08-20 RX ADMIN — FINASTERIDE 5 MILLIGRAM(S): 5 TABLET, FILM COATED ORAL at 11:55

## 2018-08-20 RX ADMIN — Medication 81 MILLIGRAM(S): at 11:55

## 2018-08-20 RX ADMIN — HEPARIN SODIUM 5000 UNIT(S): 5000 INJECTION INTRAVENOUS; SUBCUTANEOUS at 05:37

## 2018-08-20 RX ADMIN — Medication 25 MICROGRAM(S): at 05:36

## 2018-08-20 RX ADMIN — MIDODRINE HYDROCHLORIDE 5 MILLIGRAM(S): 2.5 TABLET ORAL at 05:37

## 2018-08-20 RX ADMIN — MIDODRINE HYDROCHLORIDE 5 MILLIGRAM(S): 2.5 TABLET ORAL at 16:02

## 2018-08-20 RX ADMIN — HEPARIN SODIUM 5000 UNIT(S): 5000 INJECTION INTRAVENOUS; SUBCUTANEOUS at 16:01

## 2018-08-20 RX ADMIN — SENNA PLUS 2 TABLET(S): 8.6 TABLET ORAL at 21:55

## 2018-08-20 RX ADMIN — SODIUM CHLORIDE 60 MILLILITER(S): 9 INJECTION INTRAMUSCULAR; INTRAVENOUS; SUBCUTANEOUS at 17:42

## 2018-08-20 RX ADMIN — Medication 100 MILLIGRAM(S): at 05:37

## 2018-08-20 NOTE — DIETITIAN INITIAL EVALUATION ADULT. - OTHER INFO
nutrition assessment for length of stay; lives home PTA; skin intact; Limited intake/wt change history data available at present, tolerating 75 to 85% meals per flow sheets and observation

## 2018-08-20 NOTE — PROGRESS NOTE ADULT - PROBLEM SELECTOR PLAN 2
chronic AFIB   Not candidate for anticoagulation due to recurrent falls and high risk of complication with anticoagulation  Echocardiogram eF 65% ( 4/2018)  CHADS Vasc is 1 ( age ) ,

## 2018-08-20 NOTE — PROGRESS NOTE ADULT - SUBJECTIVE AND OBJECTIVE BOX
Patient is a 88y old  Male who presents with a chief complaint of dizziness and fall (16 Aug 2018 18:15)      INTERVAL HPI/OVERNIGHT EVENTS: no overnight events    MEDICATIONS  (STANDING):  aspirin  chewable 81 milliGRAM(s) Oral daily  atorvastatin 20 milliGRAM(s) Oral at bedtime  docusate sodium 100 milliGRAM(s) Oral two times a day  finasteride 5 milliGRAM(s) Oral daily  heparin  Injectable 5000 Unit(s) SubCutaneous every 8 hours  levothyroxine 25 MICROGram(s) Oral daily  midodrine 5 milliGRAM(s) Oral three times a day  senna 2 Tablet(s) Oral at bedtime  sodium chloride 0.9%. 1000 milliLiter(s) (60 mL/Hr) IV Continuous <Continuous>    MEDICATIONS  (PRN):  Allergies    No Known Allergies    Intolerances      REVIEW OF SYSTEMS:  CONSTITUTIONAL: No fever, weight loss, or fatigue  RESPIRATORY: No cough, wheezing, chills or hemoptysis; No shortness of breath  CARDIOVASCULAR: No chest pain, palpitations, dizziness, or leg swelling  GASTROINTESTINAL: No abdominal or epigastric pain. No nausea, vomiting, or hematemesis; No diarrhea or constipation. No melena or hematochezia.  NEUROLOGICAL: No headaches, memory loss, loss of strength, numbness, or tremors  SKIN: No itching, burning, rashes, or lesions     Vital Signs Last 24 Hrs  T(C): 36.6 (20 Aug 2018 15:07), Max: 37 (20 Aug 2018 05:07)  T(F): 97.8 (20 Aug 2018 15:07), Max: 98.6 (20 Aug 2018 05:07)  HR: 76 (20 Aug 2018 15:07) (65 - 76)  BP: 158/70 (20 Aug 2018 15:07) (100/72 - 158/70)  BP(mean): --  RR: 18 (20 Aug 2018 15:07) (18 - 19)  SpO2: 98% (20 Aug 2018 15:07) (98% - 99%)    PHYSICAL EXAM:    GENERAL: NAD,  HEAD:  Atraumatic, Normocephalic  EYES: EOMI, PERRLA, conjunctiva and sclera clear  NECK: Supple, No JVD, Normal thyroid  CHEST/LUNG: Clear to percussion bilaterally  HEART: Regular rate and rhythm  ABDOMEN: Soft, Nontender, Nondistended; Bowel sounds present  NERVOUS SYSTEM:  Alert & Oriented X3, Good concentration; Motor Strength 5/5 B/L   EXTREMITIES:  2+ Peripheral Pulses, No clubbing, cyanosis, or edema      LABS:                CAPILLARY BLOOD GLUCOSE      POCT Blood Glucose.: 133 mg/dL (20 Aug 2018 11:24)  POCT Blood Glucose.: 90 mg/dL (20 Aug 2018 07:57)  POCT Blood Glucose.: 112 mg/dL (19 Aug 2018 21:14)      RADIOLOGY & ADDITIONAL TESTS:

## 2018-08-20 NOTE — PROGRESS NOTE ADULT - PROBLEM SELECTOR PLAN 1
Continue midodrine and gentle hydration   awaiting rehab   Maintain Fall precautions    Pacemaker interrogation done- working fine

## 2018-08-20 NOTE — PROGRESS NOTE ADULT - ASSESSMENT
89 yo male with PMH of HTN , HLD , BPH , Hypothyroidism , DJD , CKD stage 3 , Autonomic dysfunction and Orthostatic hypotension ( admitted to [facility] in 2017 , discharged with PPM and started on Midodrine ) , comes in after h/o multiple falls. Patient reports of dizziness , almost like a room spinning sensation .   Admitted for s/p  fall     admitted to telemetry for Multiple falls , new onset Atrial fibrillation.   Pt assessed at bedside . No new complaints. Waiting for placement in [facility],

## 2018-08-20 NOTE — DIETITIAN INITIAL EVALUATION ADULT. - NS AS NUTRI INTERV MEALS SNACK
General/healthful diet/Composition of meals/snacks/continue diet Rx as ordered Composition of meals/snacks/General/healthful diet/change diet to Consistent CHO DASH diet as medically feasible

## 2018-08-20 NOTE — DIETITIAN INITIAL EVALUATION ADULT. - NS FNS WEIGHT USED FOR CALC
current/Ht=5' 8" ??  previous admission Ht=5' 3"   ZLG=103 lb   Current hq=883.1 lb  8/16/18-->120.1 lb 8/19/18   BMI=21.3

## 2018-08-21 VITALS
RESPIRATION RATE: 18 BRPM | TEMPERATURE: 98 F | OXYGEN SATURATION: 100 % | SYSTOLIC BLOOD PRESSURE: 169 MMHG | HEART RATE: 62 BPM | DIASTOLIC BLOOD PRESSURE: 62 MMHG

## 2018-08-21 PROCEDURE — 97530 THERAPEUTIC ACTIVITIES: CPT

## 2018-08-21 PROCEDURE — 93306 TTE W/DOPPLER COMPLETE: CPT

## 2018-08-21 PROCEDURE — 82550 ASSAY OF CK (CPK): CPT

## 2018-08-21 PROCEDURE — 81001 URINALYSIS AUTO W/SCOPE: CPT

## 2018-08-21 PROCEDURE — 82962 GLUCOSE BLOOD TEST: CPT

## 2018-08-21 PROCEDURE — 97110 THERAPEUTIC EXERCISES: CPT

## 2018-08-21 PROCEDURE — 82607 VITAMIN B-12: CPT

## 2018-08-21 PROCEDURE — 72170 X-RAY EXAM OF PELVIS: CPT

## 2018-08-21 PROCEDURE — 86780 TREPONEMA PALLIDUM: CPT

## 2018-08-21 PROCEDURE — 93005 ELECTROCARDIOGRAM TRACING: CPT

## 2018-08-21 PROCEDURE — 82306 VITAMIN D 25 HYDROXY: CPT

## 2018-08-21 PROCEDURE — 84100 ASSAY OF PHOSPHORUS: CPT

## 2018-08-21 PROCEDURE — 80048 BASIC METABOLIC PNL TOTAL CA: CPT

## 2018-08-21 PROCEDURE — 70450 CT HEAD/BRAIN W/O DYE: CPT

## 2018-08-21 PROCEDURE — 99285 EMERGENCY DEPT VISIT HI MDM: CPT | Mod: 25

## 2018-08-21 PROCEDURE — 83735 ASSAY OF MAGNESIUM: CPT

## 2018-08-21 PROCEDURE — 83690 ASSAY OF LIPASE: CPT

## 2018-08-21 PROCEDURE — 80061 LIPID PANEL: CPT

## 2018-08-21 PROCEDURE — 85027 COMPLETE CBC AUTOMATED: CPT

## 2018-08-21 PROCEDURE — 84443 ASSAY THYROID STIM HORMONE: CPT

## 2018-08-21 PROCEDURE — 80053 COMPREHEN METABOLIC PANEL: CPT

## 2018-08-21 PROCEDURE — 82553 CREATINE MB FRACTION: CPT

## 2018-08-21 PROCEDURE — 97116 GAIT TRAINING THERAPY: CPT

## 2018-08-21 PROCEDURE — 84484 ASSAY OF TROPONIN QUANT: CPT

## 2018-08-21 PROCEDURE — 83036 HEMOGLOBIN GLYCOSYLATED A1C: CPT

## 2018-08-21 PROCEDURE — 99239 HOSP IP/OBS DSCHRG MGMT >30: CPT

## 2018-08-21 PROCEDURE — 71045 X-RAY EXAM CHEST 1 VIEW: CPT

## 2018-08-21 RX ADMIN — Medication 25 MICROGRAM(S): at 06:29

## 2018-08-21 RX ADMIN — FINASTERIDE 5 MILLIGRAM(S): 5 TABLET, FILM COATED ORAL at 12:46

## 2018-08-21 RX ADMIN — HEPARIN SODIUM 5000 UNIT(S): 5000 INJECTION INTRAVENOUS; SUBCUTANEOUS at 06:29

## 2018-08-21 RX ADMIN — Medication 81 MILLIGRAM(S): at 12:46

## 2018-08-21 RX ADMIN — MIDODRINE HYDROCHLORIDE 5 MILLIGRAM(S): 2.5 TABLET ORAL at 06:29

## 2018-08-21 RX ADMIN — Medication 100 MILLIGRAM(S): at 06:29

## 2018-08-21 NOTE — PROGRESS NOTE ADULT - SUBJECTIVE AND OBJECTIVE BOX
MEDICAL ATTENDING NOTE- LATE ENTRY    Patient is a 88y old  Male who presents with a chief complaint of dizziness and fall (16 Aug 2018 18:15)      INTERVAL HPI/OVERNIGHT EVENTS: no new complaints    MEDICATIONS  (STANDING):  aspirin  chewable 81 milliGRAM(s) Oral daily  atorvastatin 20 milliGRAM(s) Oral at bedtime  docusate sodium 100 milliGRAM(s) Oral two times a day  finasteride 5 milliGRAM(s) Oral daily  heparin  Injectable 5000 Unit(s) SubCutaneous every 8 hours  levothyroxine 25 MICROGram(s) Oral daily  midodrine 5 milliGRAM(s) Oral three times a day  senna 2 Tablet(s) Oral at bedtime  sodium chloride 0.9%. 1000 milliLiter(s) (60 mL/Hr) IV Continuous <Continuous>  sodium chloride 0.9%. 1000 milliLiter(s) (60 mL/Hr) IV Continuous <Continuous>    MEDICATIONS  (PRN):      __________________________________________________  ----------------------------------------------------------------------------------  REVIEW OF SYSTEMS: no fever, no SOB, No Chest pain; feels well      Vital Signs Last 24 Hrs  T(C): 36.9 (21 Aug 2018 07:35), Max: 36.9 (21 Aug 2018 07:35)  T(F): 98.4 (21 Aug 2018 07:35), Max: 98.4 (21 Aug 2018 07:35)  HR: 62 (21 Aug 2018 07:35) (61 - 82)  BP: 169/62 (21 Aug 2018 07:35) (123/51 - 169/62)  BP(mean): --  RR: 18 (21 Aug 2018 07:35) (18 - 18)  SpO2: 100% (21 Aug 2018 07:35) (98% - 100%)    _________________  PHYSICAL EXAM:  ---------------------------   NAD; Normocephalic;   LUNGS - no wheezing  HEART: S1 S2+   ABDOMEN: Soft, Nontender, non distended; BS+  EXTREMITIES: no cyanosis; no edema  NERVOUS SYSTEM:  Awake and alert; no new deficits    _________________________________________________  LABS:              CAPILLARY BLOOD GLUCOSE      POCT Blood Glucose.: 148 mg/dL (21 Aug 2018 11:36)  POCT Blood Glucose.: 97 mg/dL (21 Aug 2018 07:46)            Care Discussed with Consultants :     Plan of care was discussed with patient ; all questions and concerns were addressed and care was aligned with patient's wishes.  Patient has been advised to follow up with PMD upon discharge from the hospital.  Discharge plans discussed with nursing staff , case manger and .

## 2018-08-21 NOTE — PROGRESS NOTE ADULT - PROBLEM SELECTOR PROBLEM 3
FANY (acute kidney injury)

## 2018-08-21 NOTE — PROGRESS NOTE ADULT - PROBLEM SELECTOR PLAN 5
Continue with finasteride

## 2018-08-21 NOTE — PROGRESS NOTE ADULT - ATTENDING COMMENTS
Plan discussed with patient and family ( spouse ) in details at bedside and all their questions / concerns were addressed
Patient was seen and examined by myself. Case was discussed with house staff in details. I have reviewed and agree with the plan as outlined above with edits where appropriate.
Patient was seen and examined by myself. Case was discussed with house staff in details. I have reviewed and agree with the plan as outlined above with edits where appropriate.    Remains clinically stable.  Await discharge pending rehab availability
Patient was seen and examined by myself. Case was discussed with house staff in details. I have reviewed and agree with the plan as outlined above with edits where appropriate.
Patient was seen and examined by myself. Case was discussed with house staff in details. I have reviewed and agree with the plan as outlined above with edits where appropriate.    Elderly M with   1. Unsteady gait  2. Recurrent falls.  3. Paroxysmal AFIB  4. FANY  5. BPH    - Continue fluids  - Fall precautions  - Rate controlled  - Fluid support  - Continue maintenance meds  Patient not candidate for anticoagulation due to multiple falls and risk of complications.

## 2018-08-21 NOTE — PROGRESS NOTE ADULT - ASSESSMENT
87 yo male with PMH of HTN , HLD , BPH , Hypothyroidism , DJD , CKD stage 3 , Autonomic dysfunction and Orthostatic hypotension ( admitted to [facility] in 2017 , discharged with PPM and started on Midodrine ) , comes in after h/o multiple falls. Patient reports of dizziness , almost like a room spinning sensation .   Admitted for s/p  fall     admitted to telemetry for Multiple falls , new onset Atrial fibrillation.

## 2018-08-22 ENCOUNTER — OTHER (OUTPATIENT)
Age: 83
End: 2018-08-22

## 2018-08-23 ENCOUNTER — OTHER (OUTPATIENT)
Age: 83
End: 2018-08-23

## 2018-08-24 ENCOUNTER — OTHER (OUTPATIENT)
Age: 83
End: 2018-08-24

## 2018-09-04 ENCOUNTER — MEDICATION RENEWAL (OUTPATIENT)
Age: 83
End: 2018-09-04

## 2018-09-07 ENCOUNTER — RX RENEWAL (OUTPATIENT)
Age: 83
End: 2018-09-07

## 2018-09-28 ENCOUNTER — APPOINTMENT (OUTPATIENT)
Dept: INTERNAL MEDICINE | Facility: CLINIC | Age: 83
End: 2018-09-28
Payer: MEDICARE

## 2018-09-28 VITALS
DIASTOLIC BLOOD PRESSURE: 41 MMHG | TEMPERATURE: 97.6 F | WEIGHT: 108 LBS | OXYGEN SATURATION: 98 % | HEART RATE: 104 BPM | RESPIRATION RATE: 16 BRPM | SYSTOLIC BLOOD PRESSURE: 61 MMHG | BODY MASS INDEX: 21.81 KG/M2

## 2018-09-28 DIAGNOSIS — Z87.898 PERSONAL HISTORY OF OTHER SPECIFIED CONDITIONS: ICD-10-CM

## 2018-09-28 DIAGNOSIS — R53.1 WEAKNESS: ICD-10-CM

## 2018-09-28 PROCEDURE — 99214 OFFICE O/P EST MOD 30 MIN: CPT

## 2018-09-28 RX ORDER — SENNOSIDES 8.6 MG/1
8.6 TABLET ORAL
Qty: 60 | Refills: 0 | Status: DISCONTINUED | COMMUNITY
Start: 2017-06-23 | End: 2018-09-28

## 2018-10-01 NOTE — ASSESSMENT
[FreeTextEntry1] : 89 year-old man with history of autonomic dysfunction, low BP, remaining hx as noted.  Incr dose of Midodrine.  Diet and lifestyle modifications as counseled. Encouraged staying well hydrated.  F/u with Cardiology, Neurology.  Prior results of the blood tests reviewed and discussed with the patient during today's examination.  Reviewed concerning symptoms for which he should seek immediate medical attention. The plan as ordered.

## 2018-10-01 NOTE — PHYSICAL EXAM
[Well Developed] : well developed [Normal Sclera/Conjunctiva] : normal sclera/conjunctiva [Normal Outer Ear/Nose] : the outer ears and nose were normal in appearance [Supple] : supple [Clear to Auscultation] : lungs were clear to auscultation bilaterally [Normal Rate] : normal rate  [Regular Rhythm] : with a regular rhythm [Normal S1, S2] : normal S1 and S2 [No Edema] : there was no peripheral edema [Soft] : abdomen soft [Non Tender] : non-tender [Non-distended] : non-distended [Normal Bowel Sounds] : normal bowel sounds [Normal Posterior Cervical Nodes] : no posterior cervical lymphadenopathy [Normal Anterior Cervical Nodes] : no anterior cervical lymphadenopathy [Grossly Normal Strength/Tone] : grossly normal strength/tone [No Focal Deficits] : no focal deficits [Normal Affect] : the affect was normal [Normal Mood] : the mood was normal [de-identified] : frail-appearing [de-identified] : fair inspiratory effort [de-identified] : ambulates with a cane, requires assistance with transfers

## 2018-10-01 NOTE — HISTORY OF PRESENT ILLNESS
[de-identified] : 89 yo male with PMH of HTN , HLD , BPH , Hypothyroidism , DJD , CKD stage 3 , Autonomic dysfunction and Orthostatic hypotension ( admitted to [facility] license of [facility] in 2017 , discharged with PPM and started on Midodrine presents for medical follow-up.  Patient was contacted several times to f/u on his hospitalization 4 weeks ago.  He was hospitalized at [facility] 8/14/18 - 8/21/18 where he presented to the ER  after h/o multiple falls. Patient reported dizziness , almost like a room spinning sensation . Had a fall day prior to ER visit, where he hit his head , remembers the whole event. Patient did not sustain any other injuries. Denied any chest pain , sob , headache , loss of consciousness. Patient followed outpatient by Cardio , Neuro and PMD and has had multiple falls in last 1 year.  In ED , BP : 113/ 71 mm hg , HR : 84 , Temp : 97. 6 F,cbc shows hb of 11.6 ( at baseline ),bmp shows K of 5.5 which improved to 5.2 ; creatinine was 2.35 which improved to 2.26 ,Tsh wnl ,Ct head wnl ,Xray pelvis and CXR wnl. \par \par Pt admitted to floor for workup of falls. pt's bp usually runs low so pt was treated with IVF as pt seemed dehydrated. Creatinine trended down with IVF. Pacemaker ( [employer]) interrogation was done and it was working fine. CE were negative , ECHO noted for Ef and\par Orthostatic were + , PT was recommended to [facility] for strength training given his generalized weakness which patient declined to go, opting instead to go home.  \par \par Patient presents today accompanied by his son and wife.  He c/o dizziness, low BP, generalized weakness.  He denies chest pain, SOB, palpitations, lightheadedness or syncope.  He reports compliance with taking his medications. His son was not aware he had seen Cardiology and Neurology.   \par

## 2018-10-03 ENCOUNTER — OUTPATIENT (OUTPATIENT)
Dept: OUTPATIENT SERVICES | Facility: HOSPITAL | Age: 83
LOS: 1 days | End: 2018-10-03
Payer: COMMERCIAL

## 2018-10-03 ENCOUNTER — APPOINTMENT (OUTPATIENT)
Dept: CARDIOLOGY | Facility: CLINIC | Age: 83
End: 2018-10-03
Payer: MEDICARE

## 2018-10-03 VITALS
OXYGEN SATURATION: 99 % | BODY MASS INDEX: 21.6 KG/M2 | HEART RATE: 102 BPM | SYSTOLIC BLOOD PRESSURE: 78 MMHG | RESPIRATION RATE: 16 BRPM | DIASTOLIC BLOOD PRESSURE: 52 MMHG | HEIGHT: 60 IN | WEIGHT: 110 LBS

## 2018-10-03 DIAGNOSIS — Z00.8 ENCOUNTER FOR OTHER GENERAL EXAMINATION: ICD-10-CM

## 2018-10-03 PROCEDURE — G0463: CPT | Mod: 25

## 2018-10-03 PROCEDURE — ZZZZZ: CPT

## 2018-10-09 DIAGNOSIS — I95.9 HYPOTENSION, UNSPECIFIED: ICD-10-CM

## 2018-10-09 DIAGNOSIS — E78.5 HYPERLIPIDEMIA, UNSPECIFIED: ICD-10-CM

## 2018-10-09 DIAGNOSIS — R55 SYNCOPE AND COLLAPSE: ICD-10-CM

## 2018-10-11 ENCOUNTER — APPOINTMENT (OUTPATIENT)
Dept: INTERNAL MEDICINE | Facility: CLINIC | Age: 83
End: 2018-10-11

## 2018-10-11 RX ORDER — INFANT FORMULA, IRON/DHA/ARA 2.07G/1
POWDER (GRAM) ORAL
Qty: 1 | Refills: 5 | Status: ACTIVE | COMMUNITY
Start: 2018-10-11 | End: 1900-01-01

## 2018-11-08 ENCOUNTER — APPOINTMENT (OUTPATIENT)
Dept: INTERNAL MEDICINE | Facility: CLINIC | Age: 83
End: 2018-11-08

## 2018-11-14 ENCOUNTER — APPOINTMENT (OUTPATIENT)
Dept: INTERNAL MEDICINE | Facility: CLINIC | Age: 83
End: 2018-11-14
Payer: MEDICARE

## 2018-11-14 VITALS
SYSTOLIC BLOOD PRESSURE: 66 MMHG | TEMPERATURE: 97.2 F | DIASTOLIC BLOOD PRESSURE: 40 MMHG | HEART RATE: 83 BPM | OXYGEN SATURATION: 96 % | RESPIRATION RATE: 16 BRPM

## 2018-11-14 DIAGNOSIS — R09.89 OTHER SPECIFIED SYMPTOMS AND SIGNS INVOLVING THE CIRCULATORY AND RESPIRATORY SYSTEMS: ICD-10-CM

## 2018-11-14 DIAGNOSIS — R63.0 ANOREXIA: ICD-10-CM

## 2018-11-14 DIAGNOSIS — Z23 ENCOUNTER FOR IMMUNIZATION: ICD-10-CM

## 2018-11-14 PROCEDURE — 90662 IIV NO PRSV INCREASED AG IM: CPT

## 2018-11-14 PROCEDURE — G0008: CPT

## 2018-11-14 PROCEDURE — 99214 OFFICE O/P EST MOD 30 MIN: CPT | Mod: 25

## 2018-11-14 NOTE — HISTORY OF PRESENT ILLNESS
[FreeTextEntry1] : f/u hypotension, Flu vaccine [de-identified] : 89 yo male with PMH of HTN , HLD , BPH , Hypothyroidism , DJD , CKD stage 3 , Autonomic dysfunction and Orthostatic hypotension presents for medical follow-up.  He is accompanied by his son who provides supplemental history.  Patient was started on Fludrocortisone 0.1mg daily, completed yesterday.  Missed his follow-up in the office to check his blood pressure, his son reports they checked in the pharmacy and the BP was "low".  His appetite remains decreased and he is drinking just one cup of water despite encouragemenet from the family.  He does not want to go to the bathroom to urinate. \par \par He continues to experience mild dizziness, weakness - denies chest pain, SOB, palpitations, lightheadedness or syncope.

## 2018-11-14 NOTE — PHYSICAL EXAM
[Well Developed] : well developed [Normal Sclera/Conjunctiva] : normal sclera/conjunctiva [Normal Outer Ear/Nose] : the outer ears and nose were normal in appearance [Supple] : supple [Clear to Auscultation] : lungs were clear to auscultation bilaterally [Normal Rate] : normal rate  [Regular Rhythm] : with a regular rhythm [Normal S1, S2] : normal S1 and S2 [No Edema] : there was no peripheral edema [Soft] : abdomen soft [Non Tender] : non-tender [Non-distended] : non-distended [Normal Bowel Sounds] : normal bowel sounds [Normal Posterior Cervical Nodes] : no posterior cervical lymphadenopathy [Normal Anterior Cervical Nodes] : no anterior cervical lymphadenopathy [Grossly Normal Strength/Tone] : grossly normal strength/tone [No Focal Deficits] : no focal deficits [Normal Affect] : the affect was normal [Normal Mood] : the mood was normal [de-identified] : frail-appearing [de-identified] : fair inspiratory effort [de-identified] : ambulates with a cane, requires assistance with transfers

## 2018-11-14 NOTE — ASSESSMENT
[FreeTextEntry1] : 89 year-old man with history of  HTN , HLD , BPH , Hypothyroidism , DJD , CKD stage 3 , Autonomic dysfunction and Orthostatic hypotension presents for medical follow-up.  His BP remains low, increase dose of Fludrocortisone from 0.1 mg daily to 0.2 mg daily.  F/u 2 weeks for BP monitoring.  Encouraged fluids, salt intake.  F/u with Neurology in Jan.   Reviewed concerning symptoms for which he should seek immediate medical attention. The plan as ordered.

## 2018-12-07 ENCOUNTER — APPOINTMENT (OUTPATIENT)
Dept: NEUROLOGY | Facility: CLINIC | Age: 83
End: 2018-12-07
Payer: MEDICARE

## 2018-12-07 VITALS
OXYGEN SATURATION: 90 % | TEMPERATURE: 97.6 F | WEIGHT: 111 LBS | SYSTOLIC BLOOD PRESSURE: 76 MMHG | HEIGHT: 60 IN | DIASTOLIC BLOOD PRESSURE: 51 MMHG | HEART RATE: 72 BPM | BODY MASS INDEX: 21.79 KG/M2

## 2018-12-07 PROCEDURE — 99215 OFFICE O/P EST HI 40 MIN: CPT

## 2018-12-12 ENCOUNTER — APPOINTMENT (OUTPATIENT)
Dept: INTERNAL MEDICINE | Facility: CLINIC | Age: 83
End: 2018-12-12

## 2018-12-12 LAB
FOLATE SERPL-MCNC: 6.9 NG/ML
HBA1C MFR BLD HPLC: 6.1 %
T PALLIDUM AB SER QL IA: NEGATIVE
T3 SERPL-MCNC: 70 NG/DL
T4 SERPL-MCNC: 7.7 UG/DL
TSH SERPL-ACNC: 4.12 UIU/ML
VIT B12 SERPL-MCNC: 483 PG/ML

## 2018-12-14 LAB — METHYLMALONATE SERPL-SCNC: 305 NMOL/L

## 2018-12-19 ENCOUNTER — APPOINTMENT (OUTPATIENT)
Dept: CARDIOLOGY | Facility: CLINIC | Age: 83
End: 2018-12-19
Payer: MEDICARE

## 2018-12-19 ENCOUNTER — OUTPATIENT (OUTPATIENT)
Dept: OUTPATIENT SERVICES | Facility: HOSPITAL | Age: 83
LOS: 1 days | End: 2018-12-19
Payer: COMMERCIAL

## 2018-12-19 VITALS
DIASTOLIC BLOOD PRESSURE: 48 MMHG | RESPIRATION RATE: 14 BRPM | HEART RATE: 110 BPM | WEIGHT: 111 LBS | HEIGHT: 60 IN | BODY MASS INDEX: 21.79 KG/M2 | SYSTOLIC BLOOD PRESSURE: 65 MMHG | OXYGEN SATURATION: 98 %

## 2018-12-19 DIAGNOSIS — Z00.8 ENCOUNTER FOR OTHER GENERAL EXAMINATION: ICD-10-CM

## 2018-12-19 PROCEDURE — G0463: CPT | Mod: 25

## 2018-12-19 PROCEDURE — ZZZZZ: CPT

## 2018-12-19 NOTE — REASON FOR VISIT
[FreeTextEntry1] : Dear Dr. Foster:\par \par I had the pleasure of evaluating your patient, Mr. Benjamin Connelly, in the office today for follow-up cardiovascular evaluation in the office.  Since his last visit with me in October 2018, he has remained relatively stable with regards to his multiple chronic conditions.  He appears to have lost additional weight since his last visit.  Per family, he is eating very little and only taking Ensure occasionally.  He still has intermittent complaints of dizziness, and his BPs are markedly low, but around his baseline.  \par \par Echocardiogram performed in April 2018 demonstrated normal biventricular systolic function, with mild diastolic dysfunction, He has moderate AS with moderate AI.  Carotid artery US demonstrates minimal bilateral atherosclerotic disease in both ICAs.\par \par Of note, his SBPs have been low.  His SBP today in the office was in the 70s mmHg.  HIs BP was 61/41 mmHg on his last visit with you.  He has been on midodrine 10 mg TID.  Will defer to you whether he would benefit from trial of steroids.\par \par Luanne, thank you again for entrusting his care with me.  I will see him again in 1-2 months.\par \par Warmest regards,\par Yan Haines

## 2018-12-19 NOTE — REVIEW OF SYSTEMS
[Feeling Fatigued] : feeling fatigued [Recent Weight Loss (___ Lbs)] : recent [unfilled] ~Ulb weight loss [Dizziness] : dizziness [Tremor] : no tremor was seen [Numbness (Hypesthesia)] : no numbness [Convulsions] : no convulsions [Tingling (Paresthesia)] : no tingling [Negative] : Heme/Lymph

## 2018-12-19 NOTE — PHYSICAL EXAM
[General Appearance - Well Developed] : well developed [Normal Appearance] : normal appearance [Well Groomed] : well groomed [General Appearance - Well Nourished] : well nourished [No Deformities] : no deformities [General Appearance - In No Acute Distress] : no acute distress [FreeTextEntry1] : Elderly man, chronically-ill appearing, emaciated/thin in appearance [Normal Conjunctiva] : the conjunctiva exhibited no abnormalities [Eyelids - No Xanthelasma] : the eyelids demonstrated no xanthelasmas [Normal Oral Mucosa] : normal oral mucosa [No Oral Pallor] : no oral pallor [No Oral Cyanosis] : no oral cyanosis [Normal Jugular Venous A Waves Present] : normal jugular venous A waves present [Normal Jugular Venous V Waves Present] : normal jugular venous V waves present [No Jugular Venous Granados A Waves] : no jugular venous granados A waves [Respiration, Rhythm And Depth] : normal respiratory rhythm and effort [Exaggerated Use Of Accessory Muscles For Inspiration] : no accessory muscle use [Auscultation Breath Sounds / Voice Sounds] : lungs were clear to auscultation bilaterally [Heart Rate And Rhythm] : heart rate and rhythm were normal [Heart Sounds] : normal S1 and S2 [Murmurs] : no murmurs present [Abdomen Soft] : soft [Abdomen Tenderness] : non-tender [Abdomen Mass (___ Cm)] : no abdominal mass palpated [Abnormal Walk] : normal gait [Gait - Sufficient For Exercise Testing] : the gait was sufficient for exercise testing [Nail Clubbing] : no clubbing of the fingernails [Cyanosis, Localized] : no localized cyanosis [Petechial Hemorrhages (___cm)] : no petechial hemorrhages [Skin Color & Pigmentation] : normal skin color and pigmentation [] : no rash [No Venous Stasis] : no venous stasis [Skin Lesions] : no skin lesions [No Skin Ulcers] : no skin ulcer [No Xanthoma] : no  xanthoma was observed [Oriented To Time, Place, And Person] : oriented to person, place, and time [Affect] : the affect was normal [Mood] : the mood was normal [No Anxiety] : not feeling anxious

## 2018-12-21 ENCOUNTER — APPOINTMENT (OUTPATIENT)
Dept: MRI IMAGING | Facility: HOSPITAL | Age: 83
End: 2018-12-21

## 2018-12-27 DIAGNOSIS — I95.9 HYPOTENSION, UNSPECIFIED: ICD-10-CM

## 2018-12-27 DIAGNOSIS — G90.9 DISORDER OF THE AUTONOMIC NERVOUS SYSTEM, UNSPECIFIED: ICD-10-CM

## 2018-12-27 DIAGNOSIS — R55 SYNCOPE AND COLLAPSE: ICD-10-CM

## 2018-12-27 DIAGNOSIS — E78.5 HYPERLIPIDEMIA, UNSPECIFIED: ICD-10-CM

## 2018-12-28 ENCOUNTER — RX RENEWAL (OUTPATIENT)
Age: 83
End: 2018-12-28

## 2019-02-05 ENCOUNTER — INPATIENT (INPATIENT)
Facility: HOSPITAL | Age: 84
LOS: 2 days | Discharge: HOME HEALTH | DRG: 202 | End: 2019-02-08
Attending: STUDENT IN AN ORGANIZED HEALTH CARE EDUCATION/TRAINING PROGRAM | Admitting: STUDENT IN AN ORGANIZED HEALTH CARE EDUCATION/TRAINING PROGRAM
Payer: MEDICARE

## 2019-02-05 VITALS
HEART RATE: 100 BPM | OXYGEN SATURATION: 97 % | TEMPERATURE: 98 F | DIASTOLIC BLOOD PRESSURE: 58 MMHG | RESPIRATION RATE: 18 BRPM | HEIGHT: 63 IN | SYSTOLIC BLOOD PRESSURE: 102 MMHG | WEIGHT: 115.08 LBS

## 2019-02-05 DIAGNOSIS — N18.3 CHRONIC KIDNEY DISEASE, STAGE 3 (MODERATE): ICD-10-CM

## 2019-02-05 DIAGNOSIS — R26.2 DIFFICULTY IN WALKING, NOT ELSEWHERE CLASSIFIED: ICD-10-CM
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
## 2019-02-05 DIAGNOSIS — Z29.9 ENCOUNTER FOR PROPHYLACTIC MEASURES, UNSPECIFIED: ICD-10-CM

## 2019-02-05 DIAGNOSIS — E43 UNSPECIFIED SEVERE PROTEIN-CALORIE MALNUTRITION: ICD-10-CM

## 2019-02-05 DIAGNOSIS — I48.91 UNSPECIFIED ATRIAL FIBRILLATION: ICD-10-CM

## 2019-02-05 DIAGNOSIS — E03.9 HYPOTHYROIDISM, UNSPECIFIED: ICD-10-CM

## 2019-02-05 DIAGNOSIS — R62.7 ADULT FAILURE TO THRIVE: ICD-10-CM

## 2019-02-05 DIAGNOSIS — J18.9 PNEUMONIA, UNSPECIFIED ORGANISM: ICD-10-CM

## 2019-02-05 DIAGNOSIS — R94.31 ABNORMAL ELECTROCARDIOGRAM [ECG] [EKG]: ICD-10-CM

## 2019-02-05 DIAGNOSIS — E86.0 DEHYDRATION: ICD-10-CM

## 2019-02-05 DIAGNOSIS — R05 COUGH: ICD-10-CM

## 2019-02-05 DIAGNOSIS — E11.9 TYPE 2 DIABETES MELLITUS WITHOUT COMPLICATIONS: ICD-10-CM

## 2019-02-05 DIAGNOSIS — I10 ESSENTIAL (PRIMARY) HYPERTENSION: ICD-10-CM

## 2019-02-05 LAB
ALBUMIN SERPL ELPH-MCNC: 2.6 G/DL — LOW (ref 3.5–5)
ALP SERPL-CCNC: 79 U/L — SIGNIFICANT CHANGE UP (ref 40–120)
ALT FLD-CCNC: 11 U/L DA — SIGNIFICANT CHANGE UP (ref 10–60)
ANION GAP SERPL CALC-SCNC: 7 MMOL/L — SIGNIFICANT CHANGE UP (ref 5–17)
APTT BLD: 28.6 SEC — SIGNIFICANT CHANGE UP (ref 27.5–36.3)
AST SERPL-CCNC: 13 U/L — SIGNIFICANT CHANGE UP (ref 10–40)
BASOPHILS # BLD AUTO: 0.1 K/UL — SIGNIFICANT CHANGE UP (ref 0–0.2)
BASOPHILS NFR BLD AUTO: 0.6 % — SIGNIFICANT CHANGE UP (ref 0–2)
BILIRUB SERPL-MCNC: 0.5 MG/DL — SIGNIFICANT CHANGE UP (ref 0.2–1.2)
BUN SERPL-MCNC: 33 MG/DL — HIGH (ref 7–18)
CALCIUM SERPL-MCNC: 9.2 MG/DL — SIGNIFICANT CHANGE UP (ref 8.4–10.5)
CHLORIDE SERPL-SCNC: 107 MMOL/L — SIGNIFICANT CHANGE UP (ref 96–108)
CK SERPL-CCNC: 59 U/L — SIGNIFICANT CHANGE UP (ref 35–232)
CO2 SERPL-SCNC: 26 MMOL/L — SIGNIFICANT CHANGE UP (ref 22–31)
CREAT SERPL-MCNC: 1.65 MG/DL — HIGH (ref 0.5–1.3)
EOSINOPHIL # BLD AUTO: 0.4 K/UL — SIGNIFICANT CHANGE UP (ref 0–0.5)
EOSINOPHIL NFR BLD AUTO: 3.3 % — SIGNIFICANT CHANGE UP (ref 0–6)
FLU A RESULT: SIGNIFICANT CHANGE UP
FLU A RESULT: SIGNIFICANT CHANGE UP
FLUAV AG NPH QL: SIGNIFICANT CHANGE UP
FLUBV AG NPH QL: SIGNIFICANT CHANGE UP
GLUCOSE SERPL-MCNC: 120 MG/DL — HIGH (ref 70–99)
HCT VFR BLD CALC: 37 % — LOW (ref 39–50)
HGB BLD-MCNC: 11.8 G/DL — LOW (ref 13–17)
INR BLD: 1.1 RATIO — SIGNIFICANT CHANGE UP (ref 0.88–1.16)
LYMPHOCYTES # BLD AUTO: 1.1 K/UL — SIGNIFICANT CHANGE UP (ref 1–3.3)
LYMPHOCYTES # BLD AUTO: 10 % — LOW (ref 13–44)
MCHC RBC-ENTMCNC: 30.5 PG — SIGNIFICANT CHANGE UP (ref 27–34)
MCHC RBC-ENTMCNC: 31.9 GM/DL — LOW (ref 32–36)
MCV RBC AUTO: 95.4 FL — SIGNIFICANT CHANGE UP (ref 80–100)
MONOCYTES # BLD AUTO: 0.7 K/UL — SIGNIFICANT CHANGE UP (ref 0–0.9)
MONOCYTES NFR BLD AUTO: 6.6 % — SIGNIFICANT CHANGE UP (ref 2–14)
NEUTROPHILS # BLD AUTO: 8.8 K/UL — HIGH (ref 1.8–7.4)
NEUTROPHILS NFR BLD AUTO: 79.4 % — HIGH (ref 43–77)
PLATELET # BLD AUTO: 264 K/UL — SIGNIFICANT CHANGE UP (ref 150–400)
POTASSIUM SERPL-MCNC: 4.6 MMOL/L — SIGNIFICANT CHANGE UP (ref 3.5–5.3)
POTASSIUM SERPL-SCNC: 4.6 MMOL/L — SIGNIFICANT CHANGE UP (ref 3.5–5.3)
PROT SERPL-MCNC: 7.1 G/DL — SIGNIFICANT CHANGE UP (ref 6–8.3)
PROTHROM AB SERPL-ACNC: 12.2 SEC — SIGNIFICANT CHANGE UP (ref 10–12.9)
RBC # BLD: 3.87 M/UL — LOW (ref 4.2–5.8)
RBC # FLD: 12.8 % — SIGNIFICANT CHANGE UP (ref 10.3–14.5)
RSV RESULT: SIGNIFICANT CHANGE UP
RSV RNA RESP QL NAA+PROBE: SIGNIFICANT CHANGE UP
SODIUM SERPL-SCNC: 140 MMOL/L — SIGNIFICANT CHANGE UP (ref 135–145)
TROPONIN I SERPL-MCNC: <0.015 NG/ML — SIGNIFICANT CHANGE UP (ref 0–0.04)
WBC # BLD: 11.1 K/UL — HIGH (ref 3.8–10.5)
WBC # FLD AUTO: 11.1 K/UL — HIGH (ref 3.8–10.5)

## 2019-02-05 PROCEDURE — 71045 X-RAY EXAM CHEST 1 VIEW: CPT | Mod: 26

## 2019-02-05 PROCEDURE — 99223 1ST HOSP IP/OBS HIGH 75: CPT | Mod: GC

## 2019-02-05 PROCEDURE — 99285 EMERGENCY DEPT VISIT HI MDM: CPT

## 2019-02-05 RX ORDER — SODIUM CHLORIDE 9 MG/ML
1000 INJECTION INTRAMUSCULAR; INTRAVENOUS; SUBCUTANEOUS ONCE
Qty: 0 | Refills: 0 | Status: COMPLETED | OUTPATIENT
Start: 2019-02-05 | End: 2019-02-05

## 2019-02-05 RX ORDER — ASPIRIN/CALCIUM CARB/MAGNESIUM 324 MG
81 TABLET ORAL DAILY
Qty: 0 | Refills: 0 | Status: DISCONTINUED | OUTPATIENT
Start: 2019-02-05 | End: 2019-02-08

## 2019-02-05 RX ORDER — DOCUSATE SODIUM 100 MG
100 CAPSULE ORAL DAILY
Qty: 0 | Refills: 0 | Status: DISCONTINUED | OUTPATIENT
Start: 2019-02-05 | End: 2019-02-08

## 2019-02-05 RX ORDER — AZITHROMYCIN 500 MG/1
500 TABLET, FILM COATED ORAL ONCE
Qty: 0 | Refills: 0 | Status: COMPLETED | OUTPATIENT
Start: 2019-02-05 | End: 2019-02-05

## 2019-02-05 RX ORDER — FINASTERIDE 5 MG/1
5 TABLET, FILM COATED ORAL DAILY
Qty: 0 | Refills: 0 | Status: DISCONTINUED | OUTPATIENT
Start: 2019-02-05 | End: 2019-02-08

## 2019-02-05 RX ORDER — HEPARIN SODIUM 5000 [USP'U]/ML
5000 INJECTION INTRAVENOUS; SUBCUTANEOUS EVERY 8 HOURS
Qty: 0 | Refills: 0 | Status: DISCONTINUED | OUTPATIENT
Start: 2019-02-05 | End: 2019-02-08

## 2019-02-05 RX ORDER — LEVOTHYROXINE SODIUM 125 MCG
25 TABLET ORAL DAILY
Qty: 0 | Refills: 0 | Status: DISCONTINUED | OUTPATIENT
Start: 2019-02-05 | End: 2019-02-08

## 2019-02-05 RX ORDER — SODIUM CHLORIDE 9 MG/ML
1000 INJECTION INTRAMUSCULAR; INTRAVENOUS; SUBCUTANEOUS ONCE
Qty: 0 | Refills: 0 | Status: DISCONTINUED | OUTPATIENT
Start: 2019-02-05 | End: 2019-02-05

## 2019-02-05 RX ORDER — SENNA PLUS 8.6 MG/1
2 TABLET ORAL AT BEDTIME
Qty: 0 | Refills: 0 | Status: DISCONTINUED | OUTPATIENT
Start: 2019-02-05 | End: 2019-02-08

## 2019-02-05 RX ORDER — CEFTRIAXONE 500 MG/1
1 INJECTION, POWDER, FOR SOLUTION INTRAMUSCULAR; INTRAVENOUS ONCE
Qty: 0 | Refills: 0 | Status: COMPLETED | OUTPATIENT
Start: 2019-02-05 | End: 2019-02-05

## 2019-02-05 RX ORDER — PANTOPRAZOLE SODIUM 20 MG/1
40 TABLET, DELAYED RELEASE ORAL
Qty: 0 | Refills: 0 | Status: DISCONTINUED | OUTPATIENT
Start: 2019-02-05 | End: 2019-02-08

## 2019-02-05 RX ORDER — ERGOCALCIFEROL 1.25 MG/1
1 CAPSULE ORAL
Qty: 0 | Refills: 0 | COMMUNITY

## 2019-02-05 RX ORDER — SODIUM CHLORIDE 9 MG/ML
1000 INJECTION INTRAMUSCULAR; INTRAVENOUS; SUBCUTANEOUS
Qty: 0 | Refills: 0 | Status: DISCONTINUED | OUTPATIENT
Start: 2019-02-05 | End: 2019-02-06

## 2019-02-05 RX ORDER — MIDODRINE HYDROCHLORIDE 2.5 MG/1
10 TABLET ORAL THREE TIMES A DAY
Qty: 0 | Refills: 0 | Status: DISCONTINUED | OUTPATIENT
Start: 2019-02-05 | End: 2019-02-08

## 2019-02-05 RX ADMIN — SODIUM CHLORIDE 2000 MILLILITER(S): 9 INJECTION INTRAMUSCULAR; INTRAVENOUS; SUBCUTANEOUS at 12:51

## 2019-02-05 RX ADMIN — AZITHROMYCIN 250 MILLIGRAM(S): 500 TABLET, FILM COATED ORAL at 15:54

## 2019-02-05 RX ADMIN — CEFTRIAXONE 100 GRAM(S): 500 INJECTION, POWDER, FOR SOLUTION INTRAMUSCULAR; INTRAVENOUS at 15:54

## 2019-02-05 NOTE — H&P ADULT - ATTENDING COMMENTS
Patient seen/evaluated at bedside 2/5/2019 at 7:45 pm. I agree with the resident progress note/outlined plan of care. My independent findings and conclusions are documented.    Vital Signs Last 24 Hrs  T(C): 36.9 (05 Feb 2019 22:27), Max: 36.9 (05 Feb 2019 22:27)  T(F): 98.4 (05 Feb 2019 22:27), Max: 98.4 (05 Feb 2019 22:27)  HR: 95 (05 Feb 2019 22:27) (95 - 101)  BP: 127/66 (05 Feb 2019 22:27) (100/57 - 127/66)  RR: 18 (05 Feb 2019 22:27) (16 - 18)  SpO2: 97% (05 Feb 2019 22:27) (97% - 99%)    Full attending note to follow Patient seen/evaluated at bedside 2/5/2019 at 7:45 pm. I agree with the resident progress note/outlined plan of care. My independent findings and conclusions are documented.     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  pt tells me he has been roaming the streets x 2 days and has not eaten in that span of time. States that the current year is 1936. claims he fell and struck his knees and his head. History at this time appears unreliable.  He is unable to provide historical detail regarding hemoptysis, weight loss, sweats, fevers/chills.  He informed another provide that he had abdominal pain but denied this to me    Vital Signs Last 24 Hrs  T(C): 36.9 (05 Feb 2019 22:27), Max: 36.9 (05 Feb 2019 22:27)  T(F): 98.4 (05 Feb 2019 22:27), Max: 98.4 (05 Feb 2019 22:27)  HR: 95 (05 Feb 2019 22:27) (95 - 101)  BP: 127/66 (05 Feb 2019 22:27) (100/57 - 127/66)  RR: 18 (05 Feb 2019 22:27) (16 - 18)  SpO2: 97% (05 Feb 2019 22:27) (97% - 99%)    AAOx1 NAD   neck supple  dry mucous membranes  neck supple  S1S2 RRR  soft, NT, ND, + BS  no LE edema/cyanosis/clubbing  moves all extremities symmetrically though w/ generalized weakness  abrasions at b/l  knees    1. ambulatory dysfunction w/ fall  2. severe protein calorie malnutrition  3. encephalopathy  4. chronic cough x 4 months  5. abnormal EKG  6. fraility/debility  7. leukocytosis  8. dehydration    fall precautions, neurologic checks  s/p 2 Liters IVF in the ED  check orthostatics  repeat ekg in am, cycle cardiac biomarkers  CT head, q routine neuro checks  nutrition consult  abd xray, abd ultrasound, LFTs  previously known to Dr. Zhu  PMD is Dr. Chayito Foster  PT, social work and case management consult  need to corroborate with spouse  dvt ppx, not candidate for full dose AC due to history of recurrent falls  rest of plan as above

## 2019-02-05 NOTE — H&P ADULT - PROBLEM SELECTOR PLAN 9
IMPROVE VTE Individual Risk Assessment    RISK                                                          Points  [] Previous VTE                                          3  [] Thrombophilia                                         2  [] Lower limb paralysis                                2   [] Current Cancer                                        2   [x] Immobilization > 24 hrs                          1  [] ICU/CCU stay > 24 hours                          1  [x] Age > 60                                                1    Improve Score 2.  HSQ for DVT ppx.

## 2019-02-05 NOTE — H&P ADULT - PROBLEM SELECTOR PLAN 5
History of afib dx in August  - No evidence of afib on EKG on admission  - Admit to tele  - Cardio Dr Zhu

## 2019-02-05 NOTE — H&P ADULT - PROBLEM SELECTOR PLAN 1
P/w ambulatory dysfunction associated with fall  - P/w ambulatory dysfunction associated with fall  - May be secondary to orthostatic hypotension, arrhythmia, or mechanical fall (as per patient he slipped)  - Admit to tele  - F/u orthostatics  - F/u cardiac enzymes x3  - PT eval  - Cardio Dr Zhu P/w ambulatory dysfunction associated with fall  - May be secondary to orthostatic hypotension, arrhythmia, or mechanical fall (as per patient he slipped)  - Admit to tele  - C/w midodrine 10  - F/u orthostatics  - F/u cardiac enzymes x3  - PT eval  - Cardio Dr Zhu

## 2019-02-05 NOTE — H&P ADULT - NEGATIVE CARDIOVASCULAR SYMPTOMS
no dyspnea on exertion/no palpitations/no peripheral edema/no chest pain/no orthopnea/no paroxysmal nocturnal dyspnea

## 2019-02-05 NOTE — ED PROVIDER NOTE - NS ED ROS FT
Eyes:  No visual changes, eye pain or discharge.  ENMT:  No hearing changes, pain, no sore throat or runny nose, no difficulty swallowing  Cardiac:  No chest pain, SOB or edema. No chest pain with exertion.  Respiratory: + cough  GI:  No nausea, vomiting, diarrhea or abdominal pain. + poor appetite   :  No dysuria, frequency or burning.  MS:  + myalgia, + functional decline   Neuro:  No headache or weakness.  No LOC.  Skin:  No skin rash.   Endocrine: No history of thyroid disease or diabetes.

## 2019-02-05 NOTE — H&P ADULT - RS GEN PE MLT RESP DETAILS PC
clear to auscultation bilaterally/no wheezes/mild left sided chest wall tenderness to palpation/no rhonchi/no rales/chest wall tenderness

## 2019-02-05 NOTE — ED ADULT NURSE REASSESSMENT NOTE - NS ED NURSE REASSESS COMMENT FT1
pt awake alert  not in distress,with saline lock intact blood drawn for troponi.hooked to cardWysiwyg monitor.

## 2019-02-05 NOTE — ED PROVIDER NOTE - OBJECTIVE STATEMENT
89 yo M w hx of BPH, thryoid problems , CAD, brought in for functional decline, weakness and loss of appetite. 1 week of non productive cough NO fever or chills.  No n/v/d.

## 2019-02-05 NOTE — H&P ADULT - HISTORY OF PRESENT ILLNESS
This is an 89 y/o male, from home lives with wife, with PMH of HTN, HLD, stage 3 CKD, degenerative joint disease, orthostatic hypotension s/p PPM in 2017 presents s/p fall. He reports that he was walking in the street this morning and slipped on something, and reports this being a frequent occurrence for him. He reports that he got some abrasions on his right knee but denies LOC, hitting his head, headache, dizziness, or symptoms before fall. He also reports that he has been having a nonproductive cough for 4 months associated with some chest pain. He also has intermittent constipation with mild RLQ abd pain, as well as loss of appetite, but denies nausea, vomiting, diarrhea, fevers, chills, dizziness, headaches, weakness, numbness, SOB, dysuria. He denies smoking and alcohol use past and present.

## 2019-02-05 NOTE — ED PROVIDER NOTE - PHYSICAL EXAMINATION
CONSTITUTIONAL: Well-developed; well-nourished; in no acute distress.   SKIN: dry, no rash  HEAD: Normocephalic; atraumatic.  EYES: PERRL, EOMI, no conjunctival erythema  ENT: No nasal discharge; airway clear.  NECK: Supple; non tender.  CARD: S1, S2 normal; no murmurs, gallops, or rubs. Regular rate and rhythm.   RESP: No wheezes, rales or rhonchi.  ABD: soft ntnd  EXT: upper and lower ext contracted,  ROM limited due to contracute   LYMPH: No acute cervical adenopathy.  NEURO: Alert, oriented, grossly unremarkable  PSYCH: Cooperative, appropriate.

## 2019-02-05 NOTE — ED PROVIDER NOTE - MEDICAL DECISION MAKING DETAILS
89 yo M w weakness and progressive functional decline as well as poor appetite will check labs, cxr, ekg, urine, [facility]

## 2019-02-05 NOTE — ED ADULT TRIAGE NOTE - CHIEF COMPLAINT QUOTE
via wheelchair brought by wife for cough , runny nose  x 1 month , occasional fevers , not sleeping for days , decreased PO intake

## 2019-02-05 NOTE — H&P ADULT - PROBLEM SELECTOR PLAN 2
EKG showing 1st degree AV block and incomplete RBBB, as well as possible septal subendocardial injury  - Admit to tele  - F/u CE x3  - Cardio Dr Zhu

## 2019-02-05 NOTE — H&P ADULT - PROBLEM SELECTOR PLAN 4
P/w dry cough for 4 months  - CXR mild left basilar process  - RVP negative  - S/p azithro and rocephin in ED  - F/u CT chest

## 2019-02-05 NOTE — H&P ADULT - ASSESSMENT
This is an 88 y/o male, from home lives with wife, with PMH of HTN, HLD, stage 3 CKD, degenerative joint disease, orthostatic hypotension s/p PPM in 2017 presents s/p fall. He reports that he was walking in the street this morning and slipped on something, and reports this being a frequent occurrence for him. He reports that he got some abrasions on his right knee but denies LOC, hitting his head, headache, dizziness, or symptoms before fall. He also reports that he has been having a nonproductive cough for 4 months associated with some chest pain. He also has intermittent constipation with mild RLQ abd pain, as well as loss of appetite, but denies nausea, vomiting, diarrhea, fevers, chills, dizziness, headaches, weakness, numbness, SOB, dysuria. He denies smoking and alcohol use past and present.

## 2019-02-05 NOTE — H&P ADULT - PROBLEM SELECTOR PLAN 6
Cr elevated to 1.65 on admission, may be baseline based on previous visit  - Avoid nephrotoxic drugs

## 2019-02-05 NOTE — H&P ADULT - PROBLEM SELECTOR PLAN 3
Appears dry on exam, labs appear hemoconcentrated  - S/p 2L NS bolus in ED  - C/w gentle hydration   - Monitor for fluid overload  - F/u AM labs

## 2019-02-06 ENCOUNTER — MEDICATION RENEWAL (OUTPATIENT)
Age: 84
End: 2019-02-06

## 2019-02-06 DIAGNOSIS — I95.9 HYPOTENSION, UNSPECIFIED: ICD-10-CM

## 2019-02-06 DIAGNOSIS — G93.40 ENCEPHALOPATHY, UNSPECIFIED: ICD-10-CM

## 2019-02-06 LAB
24R-OH-CALCIDIOL SERPL-MCNC: 88 NG/ML — HIGH (ref 30–80)
ANION GAP SERPL CALC-SCNC: 9 MMOL/L — SIGNIFICANT CHANGE UP (ref 5–17)
APPEARANCE UR: CLEAR — SIGNIFICANT CHANGE UP
BILIRUB UR-MCNC: NEGATIVE — SIGNIFICANT CHANGE UP
BUN SERPL-MCNC: 27 MG/DL — HIGH (ref 7–18)
CALCIUM SERPL-MCNC: 9.2 MG/DL — SIGNIFICANT CHANGE UP (ref 8.4–10.5)
CHLORIDE SERPL-SCNC: 107 MMOL/L — SIGNIFICANT CHANGE UP (ref 96–108)
CHOLEST SERPL-MCNC: 115 MG/DL — SIGNIFICANT CHANGE UP (ref 10–199)
CK MB BLD-MCNC: 2.5 % — SIGNIFICANT CHANGE UP (ref 0–3.5)
CK MB BLD-MCNC: 2.7 % — SIGNIFICANT CHANGE UP (ref 0–3.5)
CK MB CFR SERPL CALC: 2.9 NG/ML — SIGNIFICANT CHANGE UP (ref 0–3.6)
CK MB CFR SERPL CALC: 3.1 NG/ML — SIGNIFICANT CHANGE UP (ref 0–3.6)
CK SERPL-CCNC: 107 U/L — SIGNIFICANT CHANGE UP (ref 35–232)
CK SERPL-CCNC: 122 U/L — SIGNIFICANT CHANGE UP (ref 35–232)
CO2 SERPL-SCNC: 24 MMOL/L — SIGNIFICANT CHANGE UP (ref 22–31)
COLOR SPEC: YELLOW — SIGNIFICANT CHANGE UP
CREAT SERPL-MCNC: 1.17 MG/DL — SIGNIFICANT CHANGE UP (ref 0.5–1.3)
DIFF PNL FLD: ABNORMAL
GLUCOSE SERPL-MCNC: 82 MG/DL — SIGNIFICANT CHANGE UP (ref 70–99)
GLUCOSE UR QL: NEGATIVE — SIGNIFICANT CHANGE UP
HBA1C BLD-MCNC: 5.8 % — HIGH (ref 4–5.6)
HCT VFR BLD CALC: 35.7 % — LOW (ref 39–50)
HDLC SERPL-MCNC: 56 MG/DL — SIGNIFICANT CHANGE UP
HGB BLD-MCNC: 11.3 G/DL — LOW (ref 13–17)
KETONES UR-MCNC: ABNORMAL
LEUKOCYTE ESTERASE UR-ACNC: NEGATIVE — SIGNIFICANT CHANGE UP
LIPID PNL WITH DIRECT LDL SERPL: 47 MG/DL — SIGNIFICANT CHANGE UP
MAGNESIUM SERPL-MCNC: 1.8 MG/DL — SIGNIFICANT CHANGE UP (ref 1.6–2.6)
MCHC RBC-ENTMCNC: 30 PG — SIGNIFICANT CHANGE UP (ref 27–34)
MCHC RBC-ENTMCNC: 31.7 GM/DL — LOW (ref 32–36)
MCV RBC AUTO: 94.7 FL — SIGNIFICANT CHANGE UP (ref 80–100)
NITRITE UR-MCNC: NEGATIVE — SIGNIFICANT CHANGE UP
PH UR: 5 — SIGNIFICANT CHANGE UP (ref 5–8)
PHOSPHATE SERPL-MCNC: 3.3 MG/DL — SIGNIFICANT CHANGE UP (ref 2.5–4.5)
PLATELET # BLD AUTO: 269 K/UL — SIGNIFICANT CHANGE UP (ref 150–400)
POTASSIUM SERPL-MCNC: 4 MMOL/L — SIGNIFICANT CHANGE UP (ref 3.5–5.3)
POTASSIUM SERPL-SCNC: 4 MMOL/L — SIGNIFICANT CHANGE UP (ref 3.5–5.3)
PROT UR-MCNC: 30 MG/DL
RBC # BLD: 3.77 M/UL — LOW (ref 4.2–5.8)
RBC # FLD: 12.6 % — SIGNIFICANT CHANGE UP (ref 10.3–14.5)
SODIUM SERPL-SCNC: 140 MMOL/L — SIGNIFICANT CHANGE UP (ref 135–145)
SP GR SPEC: 1.01 — SIGNIFICANT CHANGE UP (ref 1.01–1.02)
TOTAL CHOLESTEROL/HDL RATIO MEASUREMENT: 2.1 RATIO — LOW (ref 3.4–9.6)
TRIGL SERPL-MCNC: 62 MG/DL — SIGNIFICANT CHANGE UP (ref 10–149)
TROPONIN I SERPL-MCNC: <0.015 NG/ML — SIGNIFICANT CHANGE UP (ref 0–0.04)
TROPONIN I SERPL-MCNC: <0.015 NG/ML — SIGNIFICANT CHANGE UP (ref 0–0.04)
TSH SERPL-MCNC: 2 UU/ML — SIGNIFICANT CHANGE UP (ref 0.34–4.82)
UROBILINOGEN FLD QL: NEGATIVE — SIGNIFICANT CHANGE UP
WBC # BLD: 8.7 K/UL — SIGNIFICANT CHANGE UP (ref 3.8–10.5)
WBC # FLD AUTO: 8.7 K/UL — SIGNIFICANT CHANGE UP (ref 3.8–10.5)

## 2019-02-06 PROCEDURE — 93970 EXTREMITY STUDY: CPT | Mod: 26

## 2019-02-06 PROCEDURE — 99232 SBSQ HOSP IP/OBS MODERATE 35: CPT | Mod: GC

## 2019-02-06 RX ORDER — CEFTRIAXONE 500 MG/1
1 INJECTION, POWDER, FOR SOLUTION INTRAMUSCULAR; INTRAVENOUS EVERY 24 HOURS
Qty: 0 | Refills: 0 | Status: DISCONTINUED | OUTPATIENT
Start: 2019-02-07 | End: 2019-02-08

## 2019-02-06 RX ORDER — AZITHROMYCIN 500 MG/1
500 TABLET, FILM COATED ORAL EVERY 24 HOURS
Qty: 0 | Refills: 0 | Status: DISCONTINUED | OUTPATIENT
Start: 2019-02-07 | End: 2019-02-08

## 2019-02-06 RX ORDER — SODIUM CHLORIDE 9 MG/ML
1000 INJECTION INTRAMUSCULAR; INTRAVENOUS; SUBCUTANEOUS
Qty: 0 | Refills: 0 | Status: DISCONTINUED | OUTPATIENT
Start: 2019-02-06 | End: 2019-02-07

## 2019-02-06 RX ORDER — CEFTRIAXONE 500 MG/1
1 INJECTION, POWDER, FOR SOLUTION INTRAMUSCULAR; INTRAVENOUS ONCE
Qty: 0 | Refills: 0 | Status: COMPLETED | OUTPATIENT
Start: 2019-02-06 | End: 2019-02-06

## 2019-02-06 RX ORDER — CEFTRIAXONE 500 MG/1
INJECTION, POWDER, FOR SOLUTION INTRAMUSCULAR; INTRAVENOUS
Qty: 0 | Refills: 0 | Status: DISCONTINUED | OUTPATIENT
Start: 2019-02-06 | End: 2019-02-08

## 2019-02-06 RX ORDER — AZITHROMYCIN 500 MG/1
500 TABLET, FILM COATED ORAL ONCE
Qty: 0 | Refills: 0 | Status: COMPLETED | OUTPATIENT
Start: 2019-02-06 | End: 2019-02-06

## 2019-02-06 RX ORDER — AZITHROMYCIN 500 MG/1
TABLET, FILM COATED ORAL
Qty: 0 | Refills: 0 | Status: DISCONTINUED | OUTPATIENT
Start: 2019-02-06 | End: 2019-02-08

## 2019-02-06 RX ADMIN — HEPARIN SODIUM 5000 UNIT(S): 5000 INJECTION INTRAVENOUS; SUBCUTANEOUS at 02:19

## 2019-02-06 RX ADMIN — Medication 1 DROP(S): at 17:27

## 2019-02-06 RX ADMIN — SENNA PLUS 2 TABLET(S): 8.6 TABLET ORAL at 22:03

## 2019-02-06 RX ADMIN — HEPARIN SODIUM 5000 UNIT(S): 5000 INJECTION INTRAVENOUS; SUBCUTANEOUS at 22:03

## 2019-02-06 RX ADMIN — Medication 1 DROP(S): at 13:12

## 2019-02-06 RX ADMIN — CEFTRIAXONE 100 GRAM(S): 500 INJECTION, POWDER, FOR SOLUTION INTRAMUSCULAR; INTRAVENOUS at 10:21

## 2019-02-06 RX ADMIN — Medication 100 MILLIGRAM(S): at 11:04

## 2019-02-06 RX ADMIN — SENNA PLUS 2 TABLET(S): 8.6 TABLET ORAL at 02:19

## 2019-02-06 RX ADMIN — MIDODRINE HYDROCHLORIDE 10 MILLIGRAM(S): 2.5 TABLET ORAL at 02:18

## 2019-02-06 RX ADMIN — SODIUM CHLORIDE 60 MILLILITER(S): 9 INJECTION INTRAMUSCULAR; INTRAVENOUS; SUBCUTANEOUS at 22:04

## 2019-02-06 RX ADMIN — FINASTERIDE 5 MILLIGRAM(S): 5 TABLET, FILM COATED ORAL at 11:04

## 2019-02-06 RX ADMIN — Medication 1 DROP(S): at 10:21

## 2019-02-06 RX ADMIN — HEPARIN SODIUM 5000 UNIT(S): 5000 INJECTION INTRAVENOUS; SUBCUTANEOUS at 13:12

## 2019-02-06 RX ADMIN — Medication 1 DROP(S): at 06:32

## 2019-02-06 RX ADMIN — MIDODRINE HYDROCHLORIDE 10 MILLIGRAM(S): 2.5 TABLET ORAL at 22:03

## 2019-02-06 RX ADMIN — SODIUM CHLORIDE 50 MILLILITER(S): 9 INJECTION INTRAMUSCULAR; INTRAVENOUS; SUBCUTANEOUS at 06:33

## 2019-02-06 RX ADMIN — MIDODRINE HYDROCHLORIDE 10 MILLIGRAM(S): 2.5 TABLET ORAL at 11:23

## 2019-02-06 RX ADMIN — Medication 1 DROP(S): at 22:03

## 2019-02-06 RX ADMIN — Medication 1 DROP(S): at 02:14

## 2019-02-06 RX ADMIN — Medication 81 MILLIGRAM(S): at 11:05

## 2019-02-06 RX ADMIN — AZITHROMYCIN 250 MILLIGRAM(S): 500 TABLET, FILM COATED ORAL at 11:05

## 2019-02-06 RX ADMIN — HEPARIN SODIUM 5000 UNIT(S): 5000 INJECTION INTRAVENOUS; SUBCUTANEOUS at 06:32

## 2019-02-06 RX ADMIN — SODIUM CHLORIDE 60 MILLILITER(S): 9 INJECTION INTRAMUSCULAR; INTRAVENOUS; SUBCUTANEOUS at 18:31

## 2019-02-06 NOTE — CONSULT NOTE ADULT - SUBJECTIVE AND OBJECTIVE BOX
CHIEF COMPLAINT:Patient is a 89y old  Male who presents with a chief complaint of s/p fall this AM (05 Feb 2019 17:21)      HPI:  This is an 88 y/o male, from home lives with wife, with PMHX of HTN, HLD, DM, stage 3 CKD, degenerative joint disease, orthostatic hypotension s/p PPM in 2017 presents s/p fall. He reports that he was walking in the street this morning and slipped on something, and reports this being a frequent occurrence for him. He reports that he got some abrasions on his right knee but denies LOC, hitting his head, headache, dizziness, or symptoms before fall. He also reports that he has been having a nonproductive cough for 4 months associated with some chest pain. He also has intermittent constipation with mild RLQ abd pain, as well as loss of appetite, but denies nausea, vomiting, diarrhea, fevers, chills, dizziness, headaches, weakness, numbness, SOB, dysuria. He denies smoking and alcohol use past and present. (05 Feb 2019 17:21)      PAST MEDICAL & SURGICAL HISTORY:  Degenerative joint disease  Hyperlipidemia  HTN (hypertension)  Hypothyroid  Diabetes        MEDICATIONS  (STANDING):  artificial  tears Solution 1 Drop(s) Both EYES every 4 hours  aspirin  chewable 81 milliGRAM(s) Oral daily  docusate sodium 100 milliGRAM(s) Oral daily  finasteride 5 milliGRAM(s) Oral daily  heparin  Injectable 5000 Unit(s) SubCutaneous every 8 hours  levothyroxine 25 MICROGram(s) Oral daily  midodrine 10 milliGRAM(s) Oral three times a day  pantoprazole    Tablet 40 milliGRAM(s) Oral before breakfast  senna 2 Tablet(s) Oral at bedtime  sodium chloride 0.9%. 1000 milliLiter(s) (50 mL/Hr) IV Continuous <Continuous>    MEDICATIONS  (PRN):      FAMILY HISTORY:  No pertinent family history      SOCIAL HISTORY:    [x ] Non-smoker    [x ] Alcohol-denies    Allergies    No Known Allergies    Intolerances    	    REVIEW OF SYSTEMS:  CONSTITUTIONAL: No fever, weight loss, or fatigue  EYES: No eye pain, visual disturbances, or discharge  ENT:  No difficulty hearing, tinnitus, vertigo; No sinus or throat pain  NECK: No pain or stiffness  RESPIRATORY: No cough, wheezing, chills or hemoptysis; No Shortness of Breath  CARDIOVASCULAR: No chest pain, palpitations, passing out, dizziness, or leg swelling  GASTROINTESTINAL: No abdominal or epigastric pain. No nausea, vomiting, or hematemesis; No diarrhea or constipation. No melena or hematochezia.  GENITOURINARY: No dysuria, frequency, hematuria, or incontinence  NEUROLOGICAL: No headaches, memory loss, loss of strength, numbness, or tremors  SKIN: No itching, burning, rashes, or lesions   LYMPH Nodes: No enlarged glands  ENDOCRINE: No heat or cold intolerance; No hair loss  MUSCULOSKELETAL: No joint pain or swelling; No muscle, back, or extremity pain  PSYCHIATRIC: No depression, anxiety, mood swings, or difficulty sleeping  HEME/LYMPH: No easy bruising, or bleeding gums  ALLERGY AND IMMUNOLOGIC: No hives or eczema	      PHYSICAL EXAM:  T(C): 36.7 (02-06-19 @ 07:54), Max: 37 (02-06-19 @ 04:29)  HR: 99 (02-06-19 @ 07:54) (74 - 101)  BP: 117/70 (02-06-19 @ 07:54) (100/57 - 161/83)  RR: 18 (02-06-19 @ 07:54) (16 - 18)  SpO2: 95% (02-06-19 @ 07:54) (95% - 100%)  Wt(kg): --  I&O's Summary      Appearance: Normal	  HEENT:   Normal oral mucosa, PERRL, EOMI	  Lymphatic: No lymphadenopathy  Cardiovascular: Normal S1 S2, No JVD, No murmurs, No edema  Respiratory: Dec BS at bases  Psychiatry: A & O x 3, Mood & affect appropriate  Gastrointestinal:  Soft, Non-tender, + BS	  Skin: No rashes, No ecchymoses, No cyanosis	  Neurologic: Non-focal  Extremities: Normal range of motion, No clubbing, cyanosis or edema  Vascular: Peripheral pulses palpable 2+ bilaterally        ECG:  	nsr,irbbb,st-t depression lateral leads    LABS:	 	    CARDIAC MARKERS:  CARDIAC MARKERS ( 06 Feb 2019 02:42 )  <0.015 ng/mL / x     / 122 U/L / x     / 3.1 ng/mL  CARDIAC MARKERS ( 06 Feb 2019 00:22 )  <0.015 ng/mL / x     / 107 U/L / x     / 2.9 ng/mL  CARDIAC MARKERS ( 05 Feb 2019 12:38 )  <0.015 ng/mL / x     / 59 U/L / x     / x                            11.3   8.7   )-----------( 269      ( 06 Feb 2019 08:14 )             35.7     02-06    140  |  107  |  27<H>  ----------------------------<  82  4.0   |  24  |  1.17    Ca    9.2      06 Feb 2019 08:14  Phos  3.3     02-06  Mg     1.8     02-06    TPro  7.1  /  Alb  2.6<L>  /  TBili  0.5  /  DBili  x   /  AST  13  /  ALT  11  /  AlkPhos  79  02-05      Lipid Profile: Cholesterol 115  LDL 47  HDL 56  TG 62      TSH: Thyroid Stimulating Hormone, Serum: 2.00 uU/mL (02-06 @ 08:14)    EXAM:  XR CHEST AP OR PA 1V                            PROCEDURE DATE:  02/05/2019          INTERPRETATION:  AP chest on February 5, 2019 at 11:54 AM. Patient has   cough.    Heart is magnified by technique. Left-sided pacemaker again noted.    Theabove findings are similar to August 14, 2018.    The present film shows a retrocardiac atelectatic process.    There is another minimal process at right base laterally.    IMPRESSION: Basilar lung processes left greater than right new since   August2018. Pacemaker again noted.        Echo-2018:    OBSERVATIONS:  Mitral Valve: Mitral valve not well visualized, probably  normal. No mitral regurgitation is noted.  Aortic Root: Normal aortic root.  Aortic Valve: Calcified aortic valve not well seen. Peak  transaortic valvegradient equals 25 mm Hg, mean  transaortic valve gradient equals 10 mm Hg, estimated  aortic valve area equals 1.4 sqcm (by continuity equation),  dimensonless index 0.48, consistent with moderate aortic  stenosis. Trace aortic regurgitation.  Left Atrium: Left atrium not well visualized.  Left Ventricle: Endocardium not well visualized; grossly  normal left ventricular systolic function. Not all LV wall  segments were seen. Normal left ventricular internal  dimensions and wall thicknesses. Diastolic function  incompletely assessed.  Right Heart: Right atrium not well visualized. Right  ventricle not well visualized. Tricuspid valve not well  seen. Trace tricuspid regurgitation. Pulmonic valve not  well seen.  Pericardium/PleuraNo pericardial effusion.  Hemodynamic: Insufficient tricuspid regurgitation jet to  allow calculation of RVSP.        IMPRESSIONS:Normal Study  * Negative ECG evidence of ischemia after IV of Lexiscan.  * Review of raw data shows: The study is of good technical  quality.  * The left ventricle was normal in size. Normal myocardial  perfusion scan, with no evidence of infarction or  inducible ischemia.  * Post-stress resting myocardial perfusion gated SPECT  imaging was performed (LVEF > 70%)    ------------------------------------------------------------------------      ------------------------------------------------------------------------    Confirmed on  3/29/2017 - 13:31:40 at [place] by  Belinda Zhu MD

## 2019-02-06 NOTE — CONSULT NOTE ADULT - ASSESSMENT
This is an 88 y/o male, from home lives with wife, with PMHX of HTN, HLD, DM, stage 3 CKD, degenerative joint disease, orthostatic hypotension s/p PPM in 2017 presents s/p fall,FANY,?Pneumonia.  1.Tele monitoring.  2.FANY-resolved, d/c IVF.  3.CT chest-p.  4.Check PPM.  5.Hypothyroidism-synthroid.  6.ABX.  7.Orthostatic BP q shift.  8.Hypotension-cont midodrine.  9.Check am cortisol.  10.DM-Insulin.  11.BPH-proscar.  12.GI and DVT prophylaxis.

## 2019-02-07 DIAGNOSIS — I80.9 PHLEBITIS AND THROMBOPHLEBITIS OF UNSPECIFIED SITE: ICD-10-CM

## 2019-02-07 LAB
ANION GAP SERPL CALC-SCNC: 9 MMOL/L — SIGNIFICANT CHANGE UP (ref 5–17)
BUN SERPL-MCNC: 35 MG/DL — HIGH (ref 7–18)
CALCIUM SERPL-MCNC: 8.7 MG/DL — SIGNIFICANT CHANGE UP (ref 8.4–10.5)
CHLORIDE SERPL-SCNC: 110 MMOL/L — HIGH (ref 96–108)
CO2 SERPL-SCNC: 22 MMOL/L — SIGNIFICANT CHANGE UP (ref 22–31)
CORTIS AM PEAK SERPL-MCNC: 14.4 UG/DL — SIGNIFICANT CHANGE UP (ref 6–18.4)
CREAT SERPL-MCNC: 1.31 MG/DL — HIGH (ref 0.5–1.3)
D DIMER BLD IA.RAPID-MCNC: 638 NG/ML DDU — HIGH
GLUCOSE SERPL-MCNC: 183 MG/DL — HIGH (ref 70–99)
HCT VFR BLD CALC: 32.3 % — LOW (ref 39–50)
HCT VFR BLD CALC: 32.8 % — LOW (ref 39–50)
HGB BLD-MCNC: 10.1 G/DL — LOW (ref 13–17)
HGB BLD-MCNC: 10.3 G/DL — LOW (ref 13–17)
MAGNESIUM SERPL-MCNC: 1.7 MG/DL — SIGNIFICANT CHANGE UP (ref 1.6–2.6)
MAGNESIUM SERPL-MCNC: 1.9 MG/DL — SIGNIFICANT CHANGE UP (ref 1.6–2.6)
MCHC RBC-ENTMCNC: 29.6 PG — SIGNIFICANT CHANGE UP (ref 27–34)
MCHC RBC-ENTMCNC: 30.2 PG — SIGNIFICANT CHANGE UP (ref 27–34)
MCHC RBC-ENTMCNC: 31.3 GM/DL — LOW (ref 32–36)
MCHC RBC-ENTMCNC: 31.4 GM/DL — LOW (ref 32–36)
MCV RBC AUTO: 94.2 FL — SIGNIFICANT CHANGE UP (ref 80–100)
MCV RBC AUTO: 96.5 FL — SIGNIFICANT CHANGE UP (ref 80–100)
PHOSPHATE SERPL-MCNC: 2.9 MG/DL — SIGNIFICANT CHANGE UP (ref 2.5–4.5)
PHOSPHATE SERPL-MCNC: 3.4 MG/DL — SIGNIFICANT CHANGE UP (ref 2.5–4.5)
PLATELET # BLD AUTO: 269 K/UL — SIGNIFICANT CHANGE UP (ref 150–400)
PLATELET # BLD AUTO: 299 K/UL — SIGNIFICANT CHANGE UP (ref 150–400)
POTASSIUM SERPL-MCNC: 3.9 MMOL/L — SIGNIFICANT CHANGE UP (ref 3.5–5.3)
POTASSIUM SERPL-SCNC: 3.9 MMOL/L — SIGNIFICANT CHANGE UP (ref 3.5–5.3)
PROCALCITONIN SERPL-MCNC: 0.09 NG/ML — SIGNIFICANT CHANGE UP (ref 0.02–0.1)
RBC # BLD: 3.35 M/UL — LOW (ref 4.2–5.8)
RBC # BLD: 3.48 M/UL — LOW (ref 4.2–5.8)
RBC # FLD: 12.4 % — SIGNIFICANT CHANGE UP (ref 10.3–14.5)
RBC # FLD: 12.4 % — SIGNIFICANT CHANGE UP (ref 10.3–14.5)
SODIUM SERPL-SCNC: 141 MMOL/L — SIGNIFICANT CHANGE UP (ref 135–145)
VIT B12 SERPL-MCNC: 515 PG/ML — SIGNIFICANT CHANGE UP (ref 232–1245)
WBC # BLD: 8.2 K/UL — SIGNIFICANT CHANGE UP (ref 3.8–10.5)
WBC # BLD: 8.8 K/UL — SIGNIFICANT CHANGE UP (ref 3.8–10.5)
WBC # FLD AUTO: 8.2 K/UL — SIGNIFICANT CHANGE UP (ref 3.8–10.5)
WBC # FLD AUTO: 8.8 K/UL — SIGNIFICANT CHANGE UP (ref 3.8–10.5)

## 2019-02-07 PROCEDURE — 74018 RADEX ABDOMEN 1 VIEW: CPT | Mod: 26

## 2019-02-07 PROCEDURE — 70450 CT HEAD/BRAIN W/O DYE: CPT | Mod: 26

## 2019-02-07 PROCEDURE — 76705 ECHO EXAM OF ABDOMEN: CPT | Mod: 26

## 2019-02-07 PROCEDURE — 99232 SBSQ HOSP IP/OBS MODERATE 35: CPT | Mod: GC

## 2019-02-07 PROCEDURE — 71250 CT THORAX DX C-: CPT | Mod: 26

## 2019-02-07 RX ORDER — ACETAMINOPHEN 500 MG
650 TABLET ORAL EVERY 6 HOURS
Qty: 0 | Refills: 0 | Status: DISCONTINUED | OUTPATIENT
Start: 2019-02-07 | End: 2019-02-08

## 2019-02-07 RX ORDER — VANCOMYCIN HCL 1 G
750 VIAL (EA) INTRAVENOUS ONCE
Qty: 0 | Refills: 0 | Status: COMPLETED | OUTPATIENT
Start: 2019-02-07 | End: 2019-02-07

## 2019-02-07 RX ORDER — ACETAMINOPHEN 500 MG
650 TABLET ORAL EVERY 6 HOURS
Qty: 0 | Refills: 0 | Status: DISCONTINUED | OUTPATIENT
Start: 2019-02-07 | End: 2019-02-07

## 2019-02-07 RX ADMIN — Medication 1 DROP(S): at 13:12

## 2019-02-07 RX ADMIN — Medication 25 MICROGRAM(S): at 05:48

## 2019-02-07 RX ADMIN — Medication 1 DROP(S): at 05:52

## 2019-02-07 RX ADMIN — MIDODRINE HYDROCHLORIDE 10 MILLIGRAM(S): 2.5 TABLET ORAL at 05:48

## 2019-02-07 RX ADMIN — Medication 1 DROP(S): at 09:51

## 2019-02-07 RX ADMIN — HEPARIN SODIUM 5000 UNIT(S): 5000 INJECTION INTRAVENOUS; SUBCUTANEOUS at 05:48

## 2019-02-07 RX ADMIN — Medication 250 MILLIGRAM(S): at 16:30

## 2019-02-07 RX ADMIN — Medication 100 MILLIGRAM(S): at 11:55

## 2019-02-07 RX ADMIN — PANTOPRAZOLE SODIUM 40 MILLIGRAM(S): 20 TABLET, DELAYED RELEASE ORAL at 05:48

## 2019-02-07 RX ADMIN — Medication 81 MILLIGRAM(S): at 11:55

## 2019-02-07 RX ADMIN — HEPARIN SODIUM 5000 UNIT(S): 5000 INJECTION INTRAVENOUS; SUBCUTANEOUS at 13:12

## 2019-02-07 RX ADMIN — CEFTRIAXONE 100 GRAM(S): 500 INJECTION, POWDER, FOR SOLUTION INTRAMUSCULAR; INTRAVENOUS at 09:51

## 2019-02-07 RX ADMIN — MIDODRINE HYDROCHLORIDE 10 MILLIGRAM(S): 2.5 TABLET ORAL at 13:12

## 2019-02-07 RX ADMIN — Medication 1 DROP(S): at 17:26

## 2019-02-07 RX ADMIN — AZITHROMYCIN 250 MILLIGRAM(S): 500 TABLET, FILM COATED ORAL at 09:52

## 2019-02-07 RX ADMIN — FINASTERIDE 5 MILLIGRAM(S): 5 TABLET, FILM COATED ORAL at 11:55

## 2019-02-07 NOTE — PHYSICAL THERAPY INITIAL EVALUATION ADULT - MANUAL MUSCLE TESTING RESULTS, REHAB EVAL
At least 3-/5 on B UE and B LE.Assessed functionally as pt. was extremely anxious secondary to pain and fear of falling.

## 2019-02-07 NOTE — PROGRESS NOTE ADULT - PROBLEM SELECTOR PLAN 9
Pt has swelling and redness on right antecubital area, likely phlebitis.  S/P 1 dose of vancomycin.  pain management with tylenol

## 2019-02-07 NOTE — PHYSICAL THERAPY INITIAL EVALUATION ADULT - PLANNED THERAPY INTERVENTIONS, PT EVAL
postural re-education/ROM/balance training/bed mobility training/gait training/neuromuscular re-education/strengthening

## 2019-02-07 NOTE — PHYSICAL THERAPY INITIAL EVALUATION ADULT - IMPAIRMENTS FOUND, PT EVAL
muscle strength/ROM/cognitive impairment/posture/gross motor/gait, locomotion, and balance/poor safety awareness/tone/ergonomics and body mechanics

## 2019-02-07 NOTE — PHYSICAL THERAPY INITIAL EVALUATION ADULT - LEVEL OF INDEPENDENCE: SIT/STAND, REHAB EVAL
Pt deferred secondary to pain and fear of falling, despite PT encouragement Pt deferred secondary to pain and fear of falling, despite PT and PT encouragement

## 2019-02-07 NOTE — PHYSICAL THERAPY INITIAL EVALUATION ADULT - LIVES WITH, PROFILE
Pt. is a poor historian. Pt. states he lives with wife and son in private house. Unclear if stairs present.

## 2019-02-07 NOTE — PHYSICAL THERAPY INITIAL EVALUATION ADULT - ADDITIONAL COMMENTS
Pt. is poor historian. Pt. appears moderately confused. Pt. reports that he fell multiple times inside his house. Pt reports that he was ambulatory but was unable to verbalize if he used any type of device.

## 2019-02-07 NOTE — PHYSICAL THERAPY INITIAL EVALUATION ADULT - GENERAL OBSERVATIONS, REHAB EVAL
Consult received, chart reviewed. Patient received supine in bed, NAD, + tele, +foam heel lifts. Patient agreed to EVALUATION from Physical Therapist.

## 2019-02-07 NOTE — PHYSICAL THERAPY INITIAL EVALUATION ADULT - CRITERIA FOR SKILLED THERAPEUTIC INTERVENTIONS
impairments found/risk reduction/prevention/functional limitations in following categories/predicted duration of therapy intervention/rehab potential/anticipated discharge recommendation/therapy frequency

## 2019-02-08 ENCOUNTER — TRANSCRIPTION ENCOUNTER (OUTPATIENT)
Age: 84
End: 2019-02-08

## 2019-02-08 VITALS
SYSTOLIC BLOOD PRESSURE: 119 MMHG | RESPIRATION RATE: 17 BRPM | TEMPERATURE: 98 F | HEART RATE: 82 BPM | OXYGEN SATURATION: 97 % | DIASTOLIC BLOOD PRESSURE: 61 MMHG

## 2019-02-08 DIAGNOSIS — F03.90 UNSPECIFIED DEMENTIA WITHOUT BEHAVIORAL DISTURBANCE: ICD-10-CM

## 2019-02-08 LAB
ANION GAP SERPL CALC-SCNC: 7 MMOL/L — SIGNIFICANT CHANGE UP (ref 5–17)
BUN SERPL-MCNC: 27 MG/DL — HIGH (ref 7–18)
CALCIUM SERPL-MCNC: 9.1 MG/DL — SIGNIFICANT CHANGE UP (ref 8.4–10.5)
CHLORIDE SERPL-SCNC: 110 MMOL/L — HIGH (ref 96–108)
CO2 SERPL-SCNC: 25 MMOL/L — SIGNIFICANT CHANGE UP (ref 22–31)
CREAT SERPL-MCNC: 1.17 MG/DL — SIGNIFICANT CHANGE UP (ref 0.5–1.3)
GLUCOSE SERPL-MCNC: 82 MG/DL — SIGNIFICANT CHANGE UP (ref 70–99)
HCT VFR BLD CALC: 35.6 % — LOW (ref 39–50)
HGB BLD-MCNC: 11.3 G/DL — LOW (ref 13–17)
MCHC RBC-ENTMCNC: 30.4 PG — SIGNIFICANT CHANGE UP (ref 27–34)
MCHC RBC-ENTMCNC: 31.7 GM/DL — LOW (ref 32–36)
MCV RBC AUTO: 95.8 FL — SIGNIFICANT CHANGE UP (ref 80–100)
PLATELET # BLD AUTO: 288 K/UL — SIGNIFICANT CHANGE UP (ref 150–400)
POTASSIUM SERPL-MCNC: 4 MMOL/L — SIGNIFICANT CHANGE UP (ref 3.5–5.3)
POTASSIUM SERPL-SCNC: 4 MMOL/L — SIGNIFICANT CHANGE UP (ref 3.5–5.3)
RBC # BLD: 3.72 M/UL — LOW (ref 4.2–5.8)
RBC # FLD: 12.6 % — SIGNIFICANT CHANGE UP (ref 10.3–14.5)
SODIUM SERPL-SCNC: 142 MMOL/L — SIGNIFICANT CHANGE UP (ref 135–145)
WBC # BLD: 8.3 K/UL — SIGNIFICANT CHANGE UP (ref 3.8–10.5)
WBC # FLD AUTO: 8.3 K/UL — SIGNIFICANT CHANGE UP (ref 3.8–10.5)

## 2019-02-08 PROCEDURE — 93005 ELECTROCARDIOGRAM TRACING: CPT

## 2019-02-08 PROCEDURE — 36415 COLL VENOUS BLD VENIPUNCTURE: CPT

## 2019-02-08 PROCEDURE — 83735 ASSAY OF MAGNESIUM: CPT

## 2019-02-08 PROCEDURE — 99285 EMERGENCY DEPT VISIT HI MDM: CPT | Mod: 25

## 2019-02-08 PROCEDURE — 85610 PROTHROMBIN TIME: CPT

## 2019-02-08 PROCEDURE — 71250 CT THORAX DX C-: CPT

## 2019-02-08 PROCEDURE — 76705 ECHO EXAM OF ABDOMEN: CPT

## 2019-02-08 PROCEDURE — 74018 RADEX ABDOMEN 1 VIEW: CPT

## 2019-02-08 PROCEDURE — 81001 URINALYSIS AUTO W/SCOPE: CPT

## 2019-02-08 PROCEDURE — 84443 ASSAY THYROID STIM HORMONE: CPT

## 2019-02-08 PROCEDURE — 82533 TOTAL CORTISOL: CPT

## 2019-02-08 PROCEDURE — 87631 RESP VIRUS 3-5 TARGETS: CPT

## 2019-02-08 PROCEDURE — 83036 HEMOGLOBIN GLYCOSYLATED A1C: CPT

## 2019-02-08 PROCEDURE — 85379 FIBRIN DEGRADATION QUANT: CPT

## 2019-02-08 PROCEDURE — 70450 CT HEAD/BRAIN W/O DYE: CPT

## 2019-02-08 PROCEDURE — 99238 HOSP IP/OBS DSCHRG MGMT 30/<: CPT

## 2019-02-08 PROCEDURE — 84484 ASSAY OF TROPONIN QUANT: CPT

## 2019-02-08 PROCEDURE — 82553 CREATINE MB FRACTION: CPT

## 2019-02-08 PROCEDURE — 80061 LIPID PANEL: CPT

## 2019-02-08 PROCEDURE — 80048 BASIC METABOLIC PNL TOTAL CA: CPT

## 2019-02-08 PROCEDURE — 82550 ASSAY OF CK (CPK): CPT

## 2019-02-08 PROCEDURE — 93970 EXTREMITY STUDY: CPT

## 2019-02-08 PROCEDURE — 84100 ASSAY OF PHOSPHORUS: CPT

## 2019-02-08 PROCEDURE — 71045 X-RAY EXAM CHEST 1 VIEW: CPT

## 2019-02-08 PROCEDURE — 85730 THROMBOPLASTIN TIME PARTIAL: CPT

## 2019-02-08 PROCEDURE — 84145 PROCALCITONIN (PCT): CPT

## 2019-02-08 PROCEDURE — 97162 PT EVAL MOD COMPLEX 30 MIN: CPT

## 2019-02-08 PROCEDURE — 82962 GLUCOSE BLOOD TEST: CPT

## 2019-02-08 PROCEDURE — 82306 VITAMIN D 25 HYDROXY: CPT

## 2019-02-08 PROCEDURE — 82607 VITAMIN B-12: CPT

## 2019-02-08 PROCEDURE — 80053 COMPREHEN METABOLIC PANEL: CPT

## 2019-02-08 PROCEDURE — 85027 COMPLETE CBC AUTOMATED: CPT

## 2019-02-08 RX ORDER — DOCUSATE SODIUM 100 MG
1 CAPSULE ORAL
Qty: 0 | Refills: 0 | COMMUNITY

## 2019-02-08 RX ORDER — AZITHROMYCIN 500 MG/1
1 TABLET, FILM COATED ORAL
Qty: 4 | Refills: 0
Start: 2019-02-08 | End: 2019-02-11

## 2019-02-08 RX ORDER — MIRTAZAPINE 45 MG/1
7.5 TABLET, ORALLY DISINTEGRATING ORAL AT BEDTIME
Qty: 0 | Refills: 0 | Status: DISCONTINUED | OUTPATIENT
Start: 2019-02-08 | End: 2019-02-08

## 2019-02-08 RX ORDER — MIRTAZAPINE 45 MG/1
2 TABLET, ORALLY DISINTEGRATING ORAL
Qty: 0 | Refills: 0 | DISCHARGE
Start: 2019-02-08 | End: 2019-03-09

## 2019-02-08 RX ORDER — MIRTAZAPINE 45 MG/1
1 TABLET, ORALLY DISINTEGRATING ORAL
Qty: 30 | Refills: 0
Start: 2019-02-08 | End: 2019-03-09

## 2019-02-08 RX ORDER — CEFUROXIME AXETIL 250 MG
1 TABLET ORAL
Qty: 14 | Refills: 0
Start: 2019-02-08 | End: 2019-02-14

## 2019-02-08 RX ORDER — LEVOTHYROXINE SODIUM 125 MCG
1 TABLET ORAL
Qty: 20 | Refills: 0
Start: 2019-02-08 | End: 2019-02-27

## 2019-02-08 RX ORDER — MIDODRINE HYDROCHLORIDE 2.5 MG/1
1 TABLET ORAL
Qty: 60 | Refills: 0 | OUTPATIENT
Start: 2019-02-08 | End: 2019-02-27

## 2019-02-08 RX ORDER — ASPIRIN/CALCIUM CARB/MAGNESIUM 324 MG
1 TABLET ORAL
Qty: 30 | Refills: 0 | OUTPATIENT
Start: 2019-02-08 | End: 2019-03-09

## 2019-02-08 RX ADMIN — Medication 1 DROP(S): at 17:46

## 2019-02-08 RX ADMIN — CEFTRIAXONE 100 GRAM(S): 500 INJECTION, POWDER, FOR SOLUTION INTRAMUSCULAR; INTRAVENOUS at 13:30

## 2019-02-08 RX ADMIN — AZITHROMYCIN 250 MILLIGRAM(S): 500 TABLET, FILM COATED ORAL at 11:45

## 2019-02-08 RX ADMIN — Medication 1 DROP(S): at 11:50

## 2019-02-08 NOTE — PROGRESS NOTE ADULT - ASSESSMENT
This is an 88 y/o male, from home lives with wife, with PMHX of HTN, HLD, DM, stage 3 CKD, degenerative joint disease, orthostatic hypotension s/p PPM in 2017 presents s/p fall,FANY,?Pneumonia.  1.Tele monitoring.  2.FANY-resolved.  3.CT chest-p.  4.Check PPM.  5.Hypothyroidism-synthroid.  6.ABX.  7.Hypotension-cont midodrine.  8.Check am cortisol.  9.DM-Insulin.  10.BPH-proscar.  11.GI and DVT prophylaxis.
This is an 88 y/o male, from home lives with wife, with PMH of HTN, HLD, stage 3 CKD, degenerative joint disease, orthostatic hypotension s/p PPM in 2017 presents s/p fall.  Pt is  admitted for ambulatory dysfunction , abnormal EKG.
This is an 89 y/o male, from home lives with wife, with PMH of HTN, HLD, stage 3 CKD, degenerative joint disease, orthostatic hypotension s/p PPM in 2017 presents s/p fall.  Pt is  admitted for ambulatory dysfunction , abnormal EKG.
This is an 89 y/o male, from home lives with wife, with PMHX of HTN, HLD, DM, stage 3 CKD, degenerative joint disease, orthostatic hypotension s/p PPM in 2017 presents s/p fall,FANY,bronchitis.  1.D/C Tele monitoring.  2.FANY-resolved.  3.Psych eval.  4.Hypothyroidism-synthroid.  5.ABX.  6.Hypotension-cont midodrine.  7..DM-Insulin.  8.BPH-proscar.  9.GI and DVT prophylaxis.
This is an 89 y/o male, from home lives with wife, with PMH of HTN, HLD, stage 3 CKD, degenerative joint disease, orthostatic hypotension s/p PPM in 2017 presents s/p fall.  Pt is  admitted for ambulatory dysfunction , abnormal EKG.

## 2019-02-08 NOTE — PROGRESS NOTE ADULT - SUBJECTIVE AND OBJECTIVE BOX
CHIEF COMPLAINT:Patient is a 89y old  Male who presents with a chief complaint of s/p fall this AM.Pt appears comfortable.    	  REVIEW OF SYSTEMS:  CONSTITUTIONAL: No fever, weight loss, or fatigue  EYES: No eye pain, visual disturbances, or discharge  ENT:  No difficulty hearing, tinnitus, vertigo; No sinus or throat pain  NECK: No pain or stiffness  RESPIRATORY: No cough, wheezing, chills or hemoptysis; No Shortness of Breath  CARDIOVASCULAR: No chest pain, palpitations, passing out, dizziness, or leg swelling  GASTROINTESTINAL: No abdominal or epigastric pain. No nausea, vomiting, or hematemesis; No diarrhea or constipation. No melena or hematochezia.  GENITOURINARY: No dysuria, frequency, hematuria, or incontinence  NEUROLOGICAL: No headaches, memory loss, loss of strength, numbness, or tremors  SKIN: No itching, burning, rashes, or lesions   LYMPH Nodes: No enlarged glands  ENDOCRINE: No heat or cold intolerance; No hair loss  MUSCULOSKELETAL: No joint pain or swelling; No muscle, back, or extremity pain  PSYCHIATRIC: No depression, anxiety, mood swings, or difficulty sleeping  HEME/LYMPH: No easy bruising, or bleeding gums  ALLERGY AND IMMUNOLOGIC: No hives or eczema	      PHYSICAL EXAM:  T(C): 36.7 (02-07-19 @ 07:15), Max: 37 (02-06-19 @ 23:43)  HR: 66 (02-07-19 @ 04:59) (66 - 102)  BP: 136/56 (02-07-19 @ 04:59) (85/59 - 140/69)  RR: 18 (02-07-19 @ 04:59) (18 - 18)  SpO2: 97% (02-07-19 @ 07:15) (97% - 98%)  Wt(kg): --  I&O's Summary    06 Feb 2019 07:01  -  07 Feb 2019 07:00  --------------------------------------------------------  IN: 296 mL / OUT: 0 mL / NET: 296 mL        Appearance: Normal	  HEENT:   Normal oral mucosa, PERRL, EOMI	  Lymphatic: No lymphadenopathy  Cardiovascular: Normal S1 S2, No JVD, No murmurs, No edema  Respiratory: Lungs clear to auscultation	  Psychiatry: A & O x 3, Mood & affect appropriate  Gastrointestinal:  Soft, Non-tender, + BS	  Skin: No rashes, No ecchymoses, No cyanosis	  Neurologic: Non-focal  Extremities: Normal range of motion, No clubbing, cyanosis or edema  Vascular: Peripheral pulses palpable 2+ bilaterally    MEDICATIONS  (STANDING):  artificial  tears Solution 1 Drop(s) Both EYES every 4 hours  aspirin  chewable 81 milliGRAM(s) Oral daily  azithromycin  IVPB      azithromycin  IVPB 500 milliGRAM(s) IV Intermittent every 24 hours  cefTRIAXone   IVPB 1 Gram(s) IV Intermittent every 24 hours  cefTRIAXone   IVPB      docusate sodium 100 milliGRAM(s) Oral daily  finasteride 5 milliGRAM(s) Oral daily  heparin  Injectable 5000 Unit(s) SubCutaneous every 8 hours  levothyroxine 25 MICROGram(s) Oral daily  midodrine 10 milliGRAM(s) Oral three times a day  pantoprazole    Tablet 40 milliGRAM(s) Oral before breakfast  senna 2 Tablet(s) Oral at bedtime      TELEMETRY: 	nsr intermittent av paced      	  LABS:	 	    CARDIAC MARKERS:  CARDIAC MARKERS ( 06 Feb 2019 02:42 )  <0.015 ng/mL / x     / 122 U/L / x     / 3.1 ng/mL  CARDIAC MARKERS ( 06 Feb 2019 00:22 )  <0.015 ng/mL / x     / 107 U/L / x     / 2.9 ng/mL  CARDIAC MARKERS ( 05 Feb 2019 12:38 )  <0.015 ng/mL / x     / 59 U/L / x     / x                            10.1   8.8   )-----------( 269      ( 07 Feb 2019 06:52 )             32.3     02-06    140  |  107  |  27<H>  ----------------------------<  82  4.0   |  24  |  1.17    Ca    9.2      06 Feb 2019 08:14  Phos  3.4     02-07  Mg     1.9     02-07    TPro  7.1  /  Alb  2.6<L>  /  TBili  0.5  /  DBili  x   /  AST  13  /  ALT  11  /  AlkPhos  79  02-05      Lipid Profile: Cholesterol 115  LDL 47  HDL 56  TG 62    HgA1c: Hemoglobin A1C, Whole Blood: 5.8 % (02-06 @ 14:03)    TSH: Thyroid Stimulating Hormone, Serum: 2.00 uU/mL (02-06 @ 08:14)      	    EXAM:  US DPLX LWR EXT VEINS COMPL BI                            PROCEDURE DATE:  02/06/2019          INTERPRETATION:  CLINICAL STATEMENT: Unwitnessed fall    TECHNIQUE: Ultrasound of bilateral lower extremity deep venous system.    COMPARISON: None.    FINDINGS:    There is color and spectral flow, compression and augmentation of the   common femoral, superficial femoral and popliteal veins.    There is flow seen in the calf veins.    IMPRESSION:    No evidence of DVT.
CHIEF COMPLAINT:Patient is a 89y old  Male who presents with a chief complaint of s/p fall this AM .Pt has been refusing medication.    	  REVIEW OF SYSTEMS:  CONSTITUTIONAL: No fever, weight loss, or fatigue  EYES: No eye pain, visual disturbances, or discharge  ENT:  No difficulty hearing, tinnitus, vertigo; No sinus or throat pain  NECK: No pain or stiffness  RESPIRATORY: No cough, wheezing, chills or hemoptysis; No Shortness of Breath  CARDIOVASCULAR: No chest pain, palpitations, passing out, dizziness, or leg swelling  GASTROINTESTINAL: No abdominal or epigastric pain. No nausea, vomiting, or hematemesis; No diarrhea or constipation. No melena or hematochezia.  GENITOURINARY: No dysuria, frequency, hematuria, or incontinence  NEUROLOGICAL: No headaches, memory loss, loss of strength, numbness, or tremors  SKIN: No itching, burning, rashes, or lesions   LYMPH Nodes: No enlarged glands  ENDOCRINE: No heat or cold intolerance; No hair loss  MUSCULOSKELETAL: No joint pain or swelling; No muscle, back, or extremity pain  PSYCHIATRIC: No depression, anxiety, mood swings, or difficulty sleeping  HEME/LYMPH: No easy bruising, or bleeding gums  ALLERGY AND IMMUNOLOGIC: No hives or eczema	      PHYSICAL EXAM:  T(C): 36.4 (02-08-19 @ 07:23), Max: 37 (02-08-19 @ 04:41)  HR: 90 (02-08-19 @ 07:23) (72 - 106)  BP: 107/63 (02-08-19 @ 07:23) (98/56 - 156/96)  RR: 18 (02-08-19 @ 07:23) (18 - 18)  SpO2: 98% (02-08-19 @ 07:23) (97% - 100%)  Wt(kg): --  I&O's Summary    07 Feb 2019 07:01  -  08 Feb 2019 07:00  --------------------------------------------------------  IN: 675 mL / OUT: 0 mL / NET: 675 mL    08 Feb 2019 07:01  -  08 Feb 2019 10:04  --------------------------------------------------------  IN: 118 mL / OUT: 0 mL / NET: 118 mL        Appearance: Normal	  HEENT:   Normal oral mucosa, PERRL, EOMI	  Lymphatic: No lymphadenopathy  Cardiovascular: Normal S1 S2, No JVD, No murmurs, No edema  Respiratory: Lungs clear to auscultation	    Gastrointestinal:  Soft, Non-tender, + BS	  Skin: No rashes, No ecchymoses, No cyanosis	  Extremities: Normal range of motion, No clubbing, cyanosis or edema  Vascular: Peripheral pulses palpable 2+ bilaterally    MEDICATIONS  (STANDING):  acetaminophen   Tablet .. 650 milliGRAM(s) Oral every 6 hours  artificial  tears Solution 1 Drop(s) Both EYES every 4 hours  aspirin  chewable 81 milliGRAM(s) Oral daily  azithromycin  IVPB 500 milliGRAM(s) IV Intermittent every 24 hours  azithromycin  IVPB      cefTRIAXone   IVPB 1 Gram(s) IV Intermittent every 24 hours  cefTRIAXone   IVPB      docusate sodium 100 milliGRAM(s) Oral daily  finasteride 5 milliGRAM(s) Oral daily  heparin  Injectable 5000 Unit(s) SubCutaneous every 8 hours  levothyroxine 25 MICROGram(s) Oral daily  midodrine 10 milliGRAM(s) Oral three times a day  pantoprazole    Tablet 40 milliGRAM(s) Oral before breakfast  senna 2 Tablet(s) Oral at bedtime      TELEMETRY: 	      	  LABS:	 	                        11.3   8.3   )-----------( 288      ( 08 Feb 2019 07:17 )             35.6     02-08    142  |  110<H>  |  27<H>  ----------------------------<  82  4.0   |  25  |  1.17    Ca    9.1      08 Feb 2019 07:17  Phos  2.9     02-07  Mg     1.7     02-07        Lipid Profile: Cholesterol 115  LDL 47  HDL 56  TG 62    HgA1c: Hemoglobin A1C, Whole Blood: 5.8 % (02-06 @ 14:03)    TSH: Thyroid Stimulating Hormone, Serum: 2.00 uU/mL (02-06 @ 08:14)      ppm-nl fx, battery 7.5yrs	      EXAM:  CT BRAIN                            PROCEDURE DATE:  02/07/2019          INTERPRETATION:  CLINICAL STATEMENT: Fall    TECHNIQUE: CT of the head was performed without IV contrast.    COMPARISON: CT head 8/14/2018    FINDINGS:  There is mild diffuse parenchymal volume loss. There are areas of low   attenuation in the periventricular white matter likely related to mild   chronic microvascular ischemic changes.    There is no acute intracranial hemorrhage, parenchymal mass, mass effect   or midline shift. There is no acute territorial infarct. There is no   hydrocephalus. Partial empty sella    The cranium is intact.     The visualized paranasal sinuses are   well-aerated.    IMPRESSION:  No acute intracranial hemorrhage      EXAM:  CT CHEST                            PROCEDURE DATE:  02/07/2019          INTERPRETATION:  Reason for Exam:  Chronic cough    CT of the chest was performed from the thoracic inlet to the level of the   adrenal glands without contrast injection.    Comparison: Chest x-ray dated February 5, 2019    Tubes/Lines:     Dual-chamber pacemaker noted with leads in the right   atrium and ventricle.    Mediastinum/Vessels/Heart:     There is multichamber cardiac enlargement.   Thyroid gland is unremarkable. No lymphadenopathy. No pericardial   effusion.    Lungs/Pleura/Airways: Diffuse bronchial wall thickening noted along with   areas of mucoid impaction in the left upper lobe as well as bilateral   lower lobes. This can be seen in the setting of bronchitis.    Visualized abdomen:     Unremarkable noncontrast appearance of the upper   abdomen    Bones and soft tissues:     No suspicious osseous lesions. Degenerative   changes noted throughout the spine.    IMPRESSION:    Findings suggestiveof bronchitis/small airways inflammation    Cortisol AM, Serum (02.07.19 @ 09:52)    Cortisol AM, Serum: 14.4: Note reference range change for CORTAM and CORTPM. ug/dL
PGY 1 Note discussed with supervising resident and primary attending    Patient is a 89y old  Male who presents with a chief complaint of s/p fall this AM (08 Feb 2019 10:04)      INTERVAL HPI/OVERNIGHT EVENTS: Pt seen and examined at bedside. pt has no new complains.    MEDICATIONS  (STANDING):  acetaminophen   Tablet .. 650 milliGRAM(s) Oral every 6 hours  artificial  tears Solution 1 Drop(s) Both EYES every 4 hours  aspirin  chewable 81 milliGRAM(s) Oral daily  azithromycin  IVPB 500 milliGRAM(s) IV Intermittent every 24 hours  azithromycin  IVPB      cefTRIAXone   IVPB 1 Gram(s) IV Intermittent every 24 hours  cefTRIAXone   IVPB      docusate sodium 100 milliGRAM(s) Oral daily  finasteride 5 milliGRAM(s) Oral daily  heparin  Injectable 5000 Unit(s) SubCutaneous every 8 hours  levothyroxine 25 MICROGram(s) Oral daily  midodrine 10 milliGRAM(s) Oral three times a day  pantoprazole    Tablet 40 milliGRAM(s) Oral before breakfast  senna 2 Tablet(s) Oral at bedtime    MEDICATIONS  (PRN):      __________________________________________________  REVIEW OF SYSTEMS:    CONSTITUTIONAL: No fever,   EYES: no acute visual disturbances  NECK: No pain or stiffness  RESPIRATORY: No cough; No shortness of breath  CARDIOVASCULAR: No chest pain, no palpitations  GASTROINTESTINAL: No pain. No nausea or vomiting; No diarrhea   NEUROLOGICAL: No headache or numbness, no tremors  MUSCULOSKELETAL: No joint pain, no muscle pain  GENITOURINARY: no dysuria, no frequency, no hesitancy  PSYCHIATRY: no depression , no anxiety  ALL OTHER  ROS negative        Vital Signs Last 24 Hrs  T(C): 36.8 (08 Feb 2019 11:00), Max: 37 (08 Feb 2019 04:41)  T(F): 98.2 (08 Feb 2019 11:00), Max: 98.6 (08 Feb 2019 04:41)  HR: 81 (08 Feb 2019 11:00) (72 - 106)  BP: 104/58 (08 Feb 2019 11:00) (98/56 - 156/96)  BP(mean): --  RR: 17 (08 Feb 2019 11:00) (17 - 18)  SpO2: 96% (08 Feb 2019 11:00) (96% - 100%)    ________________________________________________  PHYSICAL EXAM:  GENERAL: NAD  HEENT: Normocephalic;  conjunctivae and sclerae clear; moist mucous membranes;   NECK : supple  CHEST/LUNG: Clear to auscultation bilaterally with good air entry   HEART: S1 S2  regular; no murmurs, gallops or rubs  ABDOMEN: Soft, Nontender, Nondistended; Bowel sounds present  EXTREMITIES: no cyanosis; no edema; no calf tenderness  SKIN: warm and dry; no rash  NERVOUS SYSTEM:  Awake and alert; Oriented  to place, person and time ; no new deficits    _________________________________________________  LABS:                        11.3   8.3   )-----------( 288      ( 08 Feb 2019 07:17 )             35.6     02-08    142  |  110<H>  |  27<H>  ----------------------------<  82  4.0   |  25  |  1.17    Ca    9.1      08 Feb 2019 07:17  Phos  2.9     02-07  Mg     1.7     02-07          CAPILLARY BLOOD GLUCOSE            Plan of care was discussed with patient and /or primary care giver; all questions and concerns were addressed and care was aligned with patient's wishes.
PGY 1 Note discussed with supervising resident and primary attending    Patient is a 89y old  Male who presents with a chief complaint of s/p fall this AM (2019 09:27)      INTERVAL HPI/OVERNIGHT EVENTS:  pt seen and examined at bedside. Pt looks weak, confused at times. No acute events overnight.     MEDICATIONS  (STANDING):  artificial  tears Solution 1 Drop(s) Both EYES every 4 hours  aspirin  chewable 81 milliGRAM(s) Oral daily  azithromycin  IVPB      cefTRIAXone   IVPB      docusate sodium 100 milliGRAM(s) Oral daily  finasteride 5 milliGRAM(s) Oral daily  heparin  Injectable 5000 Unit(s) SubCutaneous every 8 hours  levothyroxine 25 MICROGram(s) Oral daily  midodrine 10 milliGRAM(s) Oral three times a day  pantoprazole    Tablet 40 milliGRAM(s) Oral before breakfast  senna 2 Tablet(s) Oral at bedtime    MEDICATIONS  (PRN):      __________________________________________________  REVIEW OF SYSTEMS:    CONSTITUTIONAL: No fever,   EYES: no acute visual disturbances  NECK: No pain or stiffness  RESPIRATORY: No cough; No shortness of breath  CARDIOVASCULAR: No chest pain, no palpitations  GASTROINTESTINAL: No pain. No nausea or vomiting; No diarrhea   NEUROLOGICAL: No headache or numbness, no tremors  MUSCULOSKELETAL: No joint pain, no muscle pain  GENITOURINARY: no dysuria, no frequency, no hesitancy  PSYCHIATRY: no depression , no anxiety  ALL OTHER  ROS negative        Vital Signs Last 24 Hrs  T(C): 36.7 (2019 11:11), Max: 37 (2019 04:29)  T(F): 98.1 (2019 11:11), Max: 98.6 (2019 04:29)  HR: 102 (2019 11:11) (74 - 102)  BP: 85/59 (2019 11:11) (85/59 - 161/83)  BP(mean): --  RR: 18 (2019 11:11) (16 - 18)  SpO2: 98% (2019 11:11) (95% - 100%)    ________________________________________________  PHYSICAL EXAM:  GENERAL: NAD  HEENT: Normocephalic;  conjunctivae and sclerae clear; moist mucous membranes;   NECK : supple  CHEST/LUNG: Clear to auscultation bilaterally with decreased air entry.  HEART: S1 S2  regular; no murmurs, gallops or rubs  ABDOMEN: Soft, Nontender, Nondistended; Bowel sounds present  EXTREMITIES: no cyanosis; no edema; no calf tenderness, superficial scratch skin lesions noted on rt. knee likely from fall  SKIN: warm and dry; no rash  NERVOUS SYSTEM:  Awake and alert x2  LABS:                        11.3   8.7   )-----------( 269      ( 2019 08:14 )             35.7     02-06    140  |  107  |  27<H>  ----------------------------<  82  4.0   |  24  |  1.17    Ca    9.2      2019 08:14  Phos  3.3     02-06  Mg     1.8     02-06    TPro  7.1  /  Alb  2.6<L>  /  TBili  0.5  /  DBili  x   /  AST  13  /  ALT  11  /  AlkPhos  79  02-05    PT/INR - ( 2019 12:38 )   PT: 12.2 sec;   INR: 1.10 ratio         PTT - ( 2019 12:38 )  PTT:28.6 sec  Urinalysis Basic - ( 2019 02:42 )    Color: Yellow / Appearance: Clear / S.015 / pH: x  Gluc: x / Ketone: Small  / Bili: Negative / Urobili: Negative   Blood: x / Protein: 30 mg/dL / Nitrite: Negative   Leuk Esterase: Negative / RBC: 2-5 /HPF / WBC 0-2 /HPF   Sq Epi: x / Non Sq Epi: Few /HPF / Bacteria: Trace /HPF      CAPILLARY BLOOD GLUCOSE                Plan of care was discussed with patient and /or primary care giver; all questions and concerns were addressed and care was aligned with patient's wishes.
PGY 1 Note discussed with supervising resident and primary attending    Patient is a 89y old  Male who presents with a chief complaint of s/p fall this AM (2019 09:56)      INTERVAL HPI/OVERNIGHT EVENTS: Pt seen and examined at bedside. Pt has no new complains.     MEDICATIONS  (STANDING):  artificial  tears Solution 1 Drop(s) Both EYES every 4 hours  aspirin  chewable 81 milliGRAM(s) Oral daily  azithromycin  IVPB 500 milliGRAM(s) IV Intermittent every 24 hours  azithromycin  IVPB      cefTRIAXone   IVPB 1 Gram(s) IV Intermittent every 24 hours  cefTRIAXone   IVPB      docusate sodium 100 milliGRAM(s) Oral daily  finasteride 5 milliGRAM(s) Oral daily  heparin  Injectable 5000 Unit(s) SubCutaneous every 8 hours  levothyroxine 25 MICROGram(s) Oral daily  midodrine 10 milliGRAM(s) Oral three times a day  pantoprazole    Tablet 40 milliGRAM(s) Oral before breakfast  senna 2 Tablet(s) Oral at bedtime  vancomycin  IVPB 750 milliGRAM(s) IV Intermittent once    MEDICATIONS  (PRN):  acetaminophen   Tablet .. 650 milliGRAM(s) Oral every 6 hours PRN Temp greater or equal to 38C (100.4F), Mild Pain (1 - 3)      __________________________________________________  REVIEW OF SYSTEMS:    CONSTITUTIONAL: No fever,   EYES: no acute visual disturbances  NECK: No pain or stiffness  RESPIRATORY: No cough; No shortness of breath  CARDIOVASCULAR: No chest pain, no palpitations  GASTROINTESTINAL: No pain. No nausea or vomiting; No diarrhea   NEUROLOGICAL: No headache or numbness, no tremors  MUSCULOSKELETAL: No joint pain, no muscle pain  GENITOURINARY: no dysuria, no frequency, no hesitancy  PSYCHIATRY: no depression , no anxiety  ALL OTHER  ROS negative        Vital Signs Last 24 Hrs  T(C): 36.7 (2019 10:55), Max: 37 (2019 23:43)  T(F): 98 (2019 10:55), Max: 98.6 (2019 23:43)  HR: 72 (2019 13:15) (66 - 96)  BP: 156/96 (2019 13:15) (91/56 - 156/96)  BP(mean): --  RR: 18 (2019 10:55) (18 - 18)  SpO2: 99% (2019 13:15) (97% - 99%)    ________________________________________________  PHYSICAL EXAM:  GENERAL: NAD  HEENT: Normocephalic;  conjunctivae and sclerae clear; moist mucous membranes;   NECK : supple  CHEST/LUNG: Clear to auscultation bilaterally with decreased air entry.  HEART: S1 S2  regular; no murmurs, gallops or rubs  ABDOMEN: Soft, Nontender, Nondistended; Bowel sounds present  EXTREMITIES: no cyanosis; no edema; no calf tenderness, superficial scratch skin lesions noted on rt. knee likely from fall  SKIN: warm and dry; no rash  NERVOUS SYSTEM:  Awake and alert x2  _________________________________________________  LABS:                        10.3   8.2   )-----------( 299      ( 2019 12:55 )             32.8     02-07    141  |  110<H>  |  35<H>  ----------------------------<  183<H>  3.9   |  22  |  1.31<H>    Ca    8.7      2019 12:55  Phos  2.9     02-07  Mg     1.7     02-07        Urinalysis Basic - ( 2019 02:42 )    Color: Yellow / Appearance: Clear / S.015 / pH: x  Gluc: x / Ketone: Small  / Bili: Negative / Urobili: Negative   Blood: x / Protein: 30 mg/dL / Nitrite: Negative   Leuk Esterase: Negative / RBC: 2-5 /HPF / WBC 0-2 /HPF   Sq Epi: x / Non Sq Epi: Few /HPF / Bacteria: Trace /HPF      CAPILLARY BLOOD GLUCOSE      POCT Blood Glucose.: 109 mg/dL (2019 21:36)  POCT Blood Glucose.: 127 mg/dL (2019 16:27)          Plan of care was discussed with patient and /or primary care giver; all questions and concerns were addressed and care was aligned with patient's wishes.

## 2019-02-08 NOTE — DIETITIAN INITIAL EVALUATION ADULT. - MD RECOMMEND
Add Ensure Pudding 120 ml x tid ( 510 kcal 12 g protein ) if medically feasible; Swallow evaluation as medically feasible

## 2019-02-08 NOTE — DIETITIAN INITIAL EVALUATION ADULT. - OTHER INFO
Nutrition consult requested for decreased intake x > 5d; lives home with family; skin tear noted; denied GI distress, chewing or swallowing problem at present, eating small amount of food at lunch today per family, willing to try Ensure supplement Nutrition consult requested for decreased intake x > 5d; lives home with family; skin tear noted; denied GI distress, chewing or swallowing problem at present, eating small amount of food at lunch today per family, willing to try Ensure supplement.

## 2019-02-08 NOTE — PROGRESS NOTE ADULT - PROBLEM SELECTOR PLAN 4
Telemonitoring  Continue with aspirin  Pt is not on anticoagulant due to recurrent fall

## 2019-02-08 NOTE — PROGRESS NOTE ADULT - PROBLEM SELECTOR PLAN 5
F/u Vitamin B12 and Vitamin D level  Nutrition consult

## 2019-02-08 NOTE — DIETITIAN INITIAL EVALUATION ADULT. - PHYSICAL APPEARANCE
debilitated/cachectic per MD; Nutrition focused physical exam ( NFPE ) conducted:    Subcutaneous fat loss: [ SEVERE ] Orbital fat pads region, [ SEVERE ]Buccal fat region, [ MODERATE ]Triceps region,  [  ]Ribs region.  Muscle wasting: [ SEVERE ]Temples region, [ SEVERE ]Clavicle region, [ SEVERE ]Shoulder region, [  ]Scapula region, [ SEVERE ]Interosseous region,  [  SEVERE ]thigh region, [  SEVERE ]Calf region

## 2019-02-08 NOTE — PROGRESS NOTE ADULT - PROBLEM SELECTOR PLAN 2
Pt seems confused.   Urinalysis is negative.  CT chest showed signs of bronchitis.  Continue Ceftriaxone and azithromycin  CT head was normal
Pt seems confused.   Urinalysis is negative.  CT chest showed signs of bronchitis.  Continue Ceftriaxone and azithromycin  CT head was normal  Psychiatry evaluation
Pt seems confused.   Urinalysis is negative.  F/u CT chest for possible pneumonia  Continue Ceftriaxone and azithromycin  F/u CT head

## 2019-02-08 NOTE — DIETITIAN INITIAL EVALUATION ADULT. - FACTORS AFF FOOD INTAKE
persistent lack of appetite/difficulty chewing/change in mental status/difficulty feeding self/difficulty with food procurement/preparation

## 2019-02-08 NOTE — PROGRESS NOTE ADULT - PROBLEM SELECTOR PLAN 7
EKG showed 1 st degree AV block and incomplete RBBB, as well as possible septal subendocardial injury  Telemonitoring  Serial cardiac enzymes were normal.
EKG showed 1 st degree AV block and incomplete RBBB, as well as possible septal subendocardial injury  Telemonitoring  Serial cardiac enzymes were normal.

## 2019-02-08 NOTE — CHART NOTE - NSCHARTNOTEFT_GEN_A_CORE
Upon Nutritional Assessment by the Registered Dietitian your patient was determined to meet criteria / has evidence of the following diagnosis/diagnoses:          [ ]  Mild Protein Calorie Malnutrition        [ ]  Moderate Protein Calorie Malnutrition        [ X ] Severe Protein Calorie Malnutrition        [ ] Unspecified Protein Calorie Malnutrition        [ ] Underweight / BMI <19        [ ] Morbid Obesity / BMI > 40      Findings as based on:  •  Comprehensive nutrition assessment and consultation  •  Calorie counts (nutrient intake analysis)  •  Food acceptance and intake status from observations by staff  •  Follow up  •  Patient education  •  Intervention secondary to interdisciplinary rounds  •   concerns      Treatment:    The following diet has been recommended: Add Ensure Pudding 120 ml x tid ( 510 kcal 12 g protein ) if medically feasible; Swallow evaluation as medically feasible       PROVIDER Section:     By signing this assessment you are acknowledging and agree with the diagnosis/diagnoses assigned by the Registered Dietitian    Comments:

## 2019-02-08 NOTE — DIETITIAN INITIAL EVALUATION ADULT. - NS AS NUTRI INTERV COLLABORAT
Collaboration with other providers/Referral to other providers/Discussed with MD/RN' Swallow evaluation as medically feasible

## 2019-02-08 NOTE — CHART NOTE - NSCHARTNOTEFT_GEN_A_CORE
Pt looks confused and AAO x1, he knows only his name. He is refusing all his meds yesterday and today. Nurse crushed the meds and  tried to give it in apple sauce. He was aggressive saying my doctor says no need of meds.

## 2019-02-08 NOTE — PROGRESS NOTE ADULT - ATTENDING COMMENTS
Patient seen/evaluated at bedside 2/6/2019. I agree with the resident progress note/outlined plan of care. My independent findings and conclusions are documented.     has difficulty understanding the patient. Mr. Connelly is currently only oriented to himself. Per RN ate small breakfast and no lunch/dinner. Had denied abd pain to me yesterday but today endorsed some discomfort during exam though does not report complaints spontaneously.  Not reportedy coughing.    AAOx1 NAD   neck supple  dry mucous membranes  neck supple  S1S2 RRR  soft, NT, ND, + BS  no LE edema/cyanosis/clubbing  moves all extremities symmetrically though w/ generalized weakness  abrasions at b/l  knees    1. ambulatory dysfunction w/ fall  2. severe protein calorie malnutrition  3. encephalopathy  4. chronic cough x 4 months  5. abnormal EKG  6. fraility/debility  7. leukocytosis  8. dehydration    fall precautions, neurologic checks  s/p 2 Liters IVF in the ED, now off  check orthostatics, on midodrine. appreciate cardiology input  repeat ekg in am, cycle cardiac biomarkers  CT head, q routine neuro checks  nutrition consult  abd xray, abd ultrasound, LFTs  CT chest is still pending, currently on empiric abx   PMD is Dr. Chayito Foster  PT, social work and case management consult  need to corroborate with spouse, address goals of care and disposition once stable  physical therapy consult  fall precautions  dvt ppx, not candidate for full dose AC due to history of recurrent falls  rest of plan as above .
Agree with all the above, except   patient was seen and examined at bedside together with PT team.  services was used  for communication.    He had no complains except being afraid to walk secondary to pain and falling. PT team was not able to get him out of the bed.     ROS as above  PE:   General; fragile elderly man, sitting on the bed  Cardio: irregular rate and rhythm   Pulm: clear breath sounds  GI: abdomen is soft  Extr; no edema   neuro: Awake, oriented x1, no focal weakness     Vital Signs Last 24 Hrs  T(C): 36.5 (07 Feb 2019 15:15), Max: 37 (06 Feb 2019 23:43)  T(F): 97.7 (07 Feb 2019 15:15), Max: 98.6 (06 Feb 2019 23:43)  HR: 106 (07 Feb 2019 15:15) (66 - 106)  BP: 98/56 (07 Feb 2019 15:15) (98/56 - 156/96)  BP(mean): --  RR: 18 (07 Feb 2019 15:15) (18 - 18)  SpO2: 97% (07 Feb 2019 15:15) (97% - 99%)    1. Frequent falls   2. Chronic afib   3. Acute bronchitis   4. Phlebitis   5. Dementia   6. DVT prophylaxis     Plan   On second day of antibiotics for acute bronchitis. Antibiotics not indicated, but given that patient already received for 2 days, c/w total of 5 days   Has chronic afib, rate controlled. Not on anticoagulation secondary to frequent falls. C/w Aspirin.   PT recommended PEPE. Spoke to patients son - Benjamin Connelly, he discussed with his mother and they decided to take patient home, but requested home PT and possible visiting nursing services.   Give one dose of Vancomycin for phlebitis of right arm along with heat packs   The rest of the plan as above   Discharge planning.
Seen and examined at bedside. Family; wife and son were present during examination.   patient had no complains, comfortably laying in bed, but afraid to stand up. Family reported that patient is chair bound most of the time. Support provided by wife and son.     ROS: unable to walk   PE:   General; fragile looking elderly man, laying in bed, appears to be comfortable   Cardio: S1, S2, no murmur   Pulm: clear breath sounds, no wheezing   GI: abdomen is soft, non tender  Extr: non edema   Neuro: AOx2, no focal weakness     Vital Signs Last 24 Hrs  T(C): 36.8 (08 Feb 2019 11:00), Max: 37 (08 Feb 2019 04:41)  T(F): 98.2 (08 Feb 2019 11:00), Max: 98.6 (08 Feb 2019 04:41)  HR: 81 (08 Feb 2019 11:00) (79 - 93)  BP: 104/58 (08 Feb 2019 11:00) (104/58 - 156/60)  BP(mean): --  RR: 17 (08 Feb 2019 11:00) (17 - 18)  SpO2: 96% (08 Feb 2019 11:00) (96% - 100%)    1. Encephalopathy,   2. Ambulatory disfunction: afraid to walk.   3. Chronic afib   4. Dementia   5. Debility and malnutrition     Seen by PT and recommended PEPE. Patient and family refused rehab. CM notified to make arrangements for home PT and home services   Rate controlled, not on anticoagulation secondary to falls.   On mirtazepine to increase appetite.    To continue with Azithromycin and Ceftin for 2 more days.   Medically stable for discharge.     Plan of discharge discussed with patient family in detail.

## 2019-02-08 NOTE — DISCHARGE NOTE ADULT - PLAN OF CARE
To prevent recurrence You presented with an episode of fall.   EKG showed 1st degree AV block and incomplete RBBB, as well as possible septal subendocardial injury. Serial cardiac enzymes were normal. CT  head did not show any acute hemorrhage or infarct. Physical therapy evaluation was done and subacute rehabilitation was recommended but you wanted to go home with home physical therapy. You should continue physical therapy. To limit progression You should continue your medication as above and follow up with psychiatry doctor as  outpatient. To prevent complication Your blood pressure was found to e low. You should continue your medication as above and follow up with primary care provider. You should continue your medication as above and follow up with primary care provider. You presented with dry cough. Chest x-ray showed mild left basilar process. We started IV antibiotics.   Chest CT was done which showed signs of bronchitis. you should continue your medication as above and follow up with primary care provider.

## 2019-02-08 NOTE — PROGRESS NOTE ADULT - PROBLEM SELECTOR PLAN 1
Pt presented with an episode of fall. Pt has h/o recurrent fall  Serial cardiac enzymes normal.  -Fall precaution  -CT head was normal  -PT evaluation was done and subacute rehabilitation was recommended
Pt presented with an episode of fall. Pt has h/o recurrent fall  Serial cardiac enzymes normal.  -Fall precaution  -CT head was normal  -PT evaluation was done and subacute rehabilitation was recommended
Pt presented with an episode of fall. Pt has h/o recurrent fall  Serial cardiac enzymes normal.  -Fall precaution  -F/u CT head  -PT evaluation

## 2019-02-08 NOTE — DISCHARGE NOTE ADULT - MEDICATION SUMMARY - MEDICATIONS TO TAKE
PROCEDURE:  Right Foot Radiographs.



HISTORY:

pain  



COMPARISON:

None.



FINDINGS:



BONES:

Normal. No fracture. 



JOINTS:

Normal. 



SOFT TISSUES:

Normal. 



OTHER FINDINGS:

None.



IMPRESSION:

Normal right foot radiographs. I will START or STAY ON the medications listed below when I get home from the hospital:    finasteride 5 mg oral tablet  -- 1 tab(s) by mouth once a day  -- Indication: For BPH    aspirin 81 mg oral tablet, chewable  -- 1 tab(s) by mouth once a day  -- Indication: For Prophylactic measure    mirtazapine 7.5 mg oral tablet  -- 1 tab(s) by mouth once a day (at bedtime)  -- Indication: For Dementia    cefuroxime 250 mg oral tablet  -- 1 tab(s) by mouth 2 times a day   -- Finish all this medication unless otherwise directed by prescriber.  Medication should be taken with plenty of water.  Take with food or milk.    -- Indication: For Acute Bronchitis    azithromycin 250 mg oral tablet  -- 1 tab(s) by mouth once a day   -- Do not take dairy products, antacids, or iron preparations within one hour of this medication.  Finish all this medication unless otherwise directed by prescriber.    -- Indication: For Acute Bronchitis    midodrine 10 mg oral tablet  -- 1 tab(s) by mouth 3 times a day  -- Indication: For Hypotension    Lubricant Eye Drops ophthalmic solution  -- 1 drop(s) to each affected eye every 4 hours  -- Indication: For Eye drop    omeprazole 40 mg oral delayed release capsule  -- 1 cap(s) by mouth once a day  -- Indication: For GERD    levothyroxine 25 mcg (0.025 mg) oral tablet  -- 1 tab(s) by mouth once a day  -- Indication: For Hypothyroid    Vitamin D2 50,000 intl units (1.25 mg) oral capsule  -- 1 cap(s) by mouth once a week  -- Indication: For Prophylactic measure

## 2019-02-08 NOTE — PROGRESS NOTE ADULT - PROBLEM SELECTOR PLAN 3
pt has dry cough since 4 months  Chest x-ray showed bibasilar lung processes  CT chest showed signs of bronchitis  Continue with IV ceftriaxone and IV azithromycin
pt has dry cough since 4 months  Chest x-ray showed bibasilar lung processes  CT chest showed signs of bronchitis  Continue with IV ceftriaxone and IV azithromycin
pt has dry cough since 4 months  Chest x-ray showed bibasilar lung processes  F/u CT chest  Continue with IV ceftriaxone and IV azithromycin

## 2019-02-08 NOTE — DISCHARGE NOTE ADULT - HOSPITAL COURSE
Patient  is an 89 y/o male, from home lives with wife, with PMH of HTN, HLD, stage 3 CKD, degenerative joint disease, orthostatic hypotension s/p PPM in 2017 who  presented after an episode of fall fall. He reported that he was walking in the street this morning and slipped on something, and reports this being a frequent occurrence for him. He was admitted for ambulatory dysfunction and abnormal EKG finding. EKG showed 1st degree AV block and incomplete RBBB, as well as possible septal subendocardial injury. Serial cardiac enzymes were normal. Chest X-ray showed mild left basilar process. CT  head did not show any acute hemorrhage or infarct. CT chest was done which showed signs of bronchitis. Physical therapy evaluation was done. Pt was recommended for subacute rehabilitation but family wanted to go home with home PT. Pace maker interrogation was done which was normal. Creatinine level was elevated at admission later resolved. Psychiatry was consulted for agitation. Patient do not have decision making capacity.   Pt is stable for discharge. Case has been discussed with the attending. This is just a summary of the case. For further information please refer to pt. chart document.

## 2019-02-08 NOTE — DISCHARGE NOTE ADULT - PATIENT PORTAL LINK FT
You can access the [facility] Patient Portal, offered by [facility], by registering with the following website: [email or web address]

## 2019-02-08 NOTE — BEHAVIORAL HEALTH ASSESSMENT NOTE - SUMMARY
This is a 89 y/o ,retired,lationo male from home with PMH of HTN,HLD,stage 3 CKD,OA was admitted with s/p fall and failure to thrive.  Patient was interviewed,chart was reviewed,c ase was discussed angel LONG.  Patient is unable to provide her PMH and the reason for current hospitalization.  Patient is a/o x1, thin,cachectic,minimally verbal, sad looking,behaviorally controlled.  Speech is low pitched goal directed.Mood-"Depressed".Denied s/h thought.Denied a/v hallucinations.  Memory and cognition-limited.I&J-limited.Poor appetite and poor night sleep.

## 2019-02-08 NOTE — DIETITIAN INITIAL EVALUATION ADULT. - PERTINENT MEDS FT
reviewed   MEDICATIONS  (STANDING):  acetaminophen   Tablet .. 650 milliGRAM(s) Oral every 6 hours  artificial  tears Solution 1 Drop(s) Both EYES every 4 hours  aspirin  chewable 81 milliGRAM(s) Oral daily  azithromycin  IVPB 500 milliGRAM(s) IV Intermittent every 24 hours  azithromycin  IVPB      cefTRIAXone   IVPB 1 Gram(s) IV Intermittent every 24 hours  cefTRIAXone   IVPB      docusate sodium 100 milliGRAM(s) Oral daily  finasteride 5 milliGRAM(s) Oral daily  heparin  Injectable 5000 Unit(s) SubCutaneous every 8 hours  levothyroxine 25 MICROGram(s) Oral daily  midodrine 10 milliGRAM(s) Oral three times a day  pantoprazole    Tablet 40 milliGRAM(s) Oral before breakfast  senna 2 Tablet(s) Oral at bedtime    MEDICATIONS  (PRN):

## 2019-02-08 NOTE — DISCHARGE NOTE ADULT - CARE PLAN
Principal Discharge DX:	Ambulatory dysfunction  Secondary Diagnosis:	Dementia  Secondary Diagnosis:	Hypotension  Secondary Diagnosis:	Hypothyroid Principal Discharge DX:	Ambulatory dysfunction  Goal:	To prevent recurrence  Assessment and plan of treatment:	You presented with an episode of fall.   EKG showed 1st degree AV block and incomplete RBBB, as well as possible septal subendocardial injury. Serial cardiac enzymes were normal. CT  head did not show any acute hemorrhage or infarct. Physical therapy evaluation was done and subacute rehabilitation was recommended but you wanted to go home with home physical therapy. You should continue physical therapy.  Secondary Diagnosis:	Dementia  Goal:	To limit progression  Assessment and plan of treatment:	You should continue your medication as above and follow up with psychiatry doctor as  outpatient.  Secondary Diagnosis:	Hypotension  Goal:	To prevent complication  Assessment and plan of treatment:	Your blood pressure was found to e low. You should continue your medication as above and follow up with primary care provider.  Secondary Diagnosis:	Hypothyroid  Goal:	To prevent complication  Assessment and plan of treatment:	You should continue your medication as above and follow up with primary care provider. Principal Discharge DX:	Ambulatory dysfunction  Goal:	To prevent recurrence  Assessment and plan of treatment:	You presented with an episode of fall.   EKG showed 1st degree AV block and incomplete RBBB, as well as possible septal subendocardial injury. Serial cardiac enzymes were normal. CT  head did not show any acute hemorrhage or infarct. Physical therapy evaluation was done and subacute rehabilitation was recommended but you wanted to go home with home physical therapy. You should continue physical therapy.  Secondary Diagnosis:	Dementia  Goal:	To limit progression  Assessment and plan of treatment:	You should continue your medication as above and follow up with psychiatry doctor as  outpatient.  Secondary Diagnosis:	Hypotension  Goal:	To prevent complication  Assessment and plan of treatment:	Your blood pressure was found to e low. You should continue your medication as above and follow up with primary care provider.  Secondary Diagnosis:	Hypothyroid  Goal:	To prevent complication  Assessment and plan of treatment:	You should continue your medication as above and follow up with primary care provider.  Secondary Diagnosis:	Acute bronchitis  Goal:	To prevent complication  Assessment and plan of treatment:	You presented with dry cough. Chest x-ray showed mild left basilar process. We started IV antibiotics.   Chest CT was done which showed signs of bronchitis. you should continue your medication as above and follow up with primary care provider.

## 2019-02-11 ENCOUNTER — OTHER (OUTPATIENT)
Age: 84
End: 2019-02-11

## 2019-02-19 ENCOUNTER — RX RENEWAL (OUTPATIENT)
Age: 84
End: 2019-02-19

## 2019-02-21 ENCOUNTER — RX RENEWAL (OUTPATIENT)
Age: 84
End: 2019-02-21

## 2019-02-21 RX ORDER — BENZOCAINE 20 G/100G
100 GEL, DENTIFRICE ORAL DAILY
Qty: 30 | Refills: 5 | Status: ACTIVE | COMMUNITY
Start: 2018-03-26 | End: 1900-01-01

## 2019-02-25 ENCOUNTER — APPOINTMENT (OUTPATIENT)
Dept: NEUROLOGY | Facility: CLINIC | Age: 84
End: 2019-02-25
Payer: MEDICARE

## 2019-02-25 VITALS
SYSTOLIC BLOOD PRESSURE: 90 MMHG | TEMPERATURE: 97.9 F | OXYGEN SATURATION: 95 % | DIASTOLIC BLOOD PRESSURE: 70 MMHG | HEART RATE: 90 BPM

## 2019-02-25 VITALS — WEIGHT: 111 LBS | BODY MASS INDEX: 21.79 KG/M2 | HEIGHT: 60 IN

## 2019-02-25 PROBLEM — M19.90 UNSPECIFIED OSTEOARTHRITIS, UNSPECIFIED SITE: Chronic | Status: ACTIVE | Noted: 2019-02-05

## 2019-02-25 PROBLEM — E78.5 HYPERLIPIDEMIA, UNSPECIFIED: Chronic | Status: ACTIVE | Noted: 2019-02-05

## 2019-02-25 PROCEDURE — 95913 NRV CNDJ TEST 13/> STUDIES: CPT

## 2019-02-25 PROCEDURE — 95886 MUSC TEST DONE W/N TEST COMP: CPT

## 2019-03-11 ENCOUNTER — APPOINTMENT (OUTPATIENT)
Dept: NEUROLOGY | Facility: CLINIC | Age: 84
End: 2019-03-11
Payer: MEDICARE

## 2019-03-11 VITALS
BODY MASS INDEX: 21.79 KG/M2 | OXYGEN SATURATION: 93 % | HEIGHT: 60 IN | WEIGHT: 111 LBS | TEMPERATURE: 97.5 F | HEART RATE: 59 BPM

## 2019-03-11 DIAGNOSIS — M54.16 RADICULOPATHY, LUMBAR REGION: ICD-10-CM

## 2019-03-11 PROCEDURE — 99214 OFFICE O/P EST MOD 30 MIN: CPT

## 2019-03-11 RX ORDER — MIRTAZAPINE 15 MG/1
15 TABLET, FILM COATED ORAL
Qty: 30 | Refills: 5 | Status: ACTIVE | COMMUNITY
Start: 2019-03-11 | End: 1900-01-01

## 2019-03-11 RX ORDER — GABAPENTIN 100 MG/1
100 CAPSULE ORAL 3 TIMES DAILY
Qty: 90 | Refills: 5 | Status: ACTIVE | COMMUNITY
Start: 2019-03-11 | End: 1900-01-01

## 2019-03-11 NOTE — PHYSICAL EXAM
[General Appearance - Alert] : alert [General Appearance - In No Acute Distress] : in no acute distress [Person] : oriented to person [Concentration Intact] : normal concentrating ability [Comprehension] : comprehension intact [Cranial Nerves Optic (II)] : visual acuity intact bilaterally,  visual fields full to confrontation, pupils equal round and reactive to light [Cranial Nerves Trigeminal (V)] : facial sensation intact symmetrically [Cranial Nerves Facial (VII)] : face symmetrical [Motor Tone] : muscle tone was normal in all four extremities [Motor Strength] : muscle strength was normal in all four extremities [Sensation Tactile Decrease] : light touch was intact [Non-ambulatory] : Non-ambulatory [Place] : disoriented to place [Time] : disoriented to time [Repeating Phrases] : difficulty repeating a phrase [Vocabulary] : inadequate range of vocabulary [FreeTextEntry5] : EOMI except limited upgaze

## 2019-03-11 NOTE — DATA REVIEWED
[de-identified] : CBC and CMP unremarkable\par Hemoglobin A1c 6.1\par TSH and T4 normal, T3 low (70)\par B12 / folate normal\par RPR NR\par \par CTH (images reviwed)\par <HTML><META HTTP-EQUIV="content-type" CONTENT="text/html;charset=utf-8">\par <BR><BR>EXAM:  CT BRAIN <BR><BR><BR>PROCEDURE DATE:  02/07/2019 \par <BR><BR><BR><BR>INTERPRETATION:  CLINICAL STATEMENT: Fall <BR><BR>TECHNIQUE: CT \par of the head was performed without IV contrast. <BR><BR>COMPARISON: CT head \par 8/14/2018 <BR><BR>FINDINGS: <BR>There is mild diffuse parenchymal volume loss. \par There are areas of low <BR>attenuation in the periventricular white matter \par likely related to mild <BR>chronic microvascular ischemic changes. <BR><BR>There \par is no acute intracranial hemorrhage, parenchymal mass, mass effect or \par <BR>midline shift. There is no acute territorial infarct. There is no \par <BR>hydrocephalus. Partial empty sella <BR><BR>The cranium is intact.     The \par visualized paranasal sinuses are well-aerated. <BR><BR>IMPRESSION: <BR>No acute \par intracranial hemorrhage \par \par Nerve conduction/ EMG of the left shows chronic bilateral lumbosacral radiculopathy

## 2019-03-11 NOTE — HISTORY OF PRESENT ILLNESS
[FreeTextEntry1] : 89-year-old man with history of orthostatic hypotension here for evaluation of memory problems which have worsened over the past one month. The patient does not remember his and recognizes family members at times. He requires assistance with activities of daily leading such as eating and getting dressed. He has been wheelchair bound for about one year. No hallucinations. \par \par Has aggression at times.  Dizziness and balance problems are very pronounsed.

## 2019-03-11 NOTE — ASSESSMENT
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

## 2019-03-13 ENCOUNTER — APPOINTMENT (OUTPATIENT)
Dept: INTERNAL MEDICINE | Facility: CLINIC | Age: 84
End: 2019-03-13
Payer: MEDICARE

## 2019-03-13 ENCOUNTER — APPOINTMENT (OUTPATIENT)
Dept: UROLOGY | Facility: CLINIC | Age: 84
End: 2019-03-13
Payer: MEDICARE

## 2019-03-13 VITALS
HEART RATE: 85 BPM | TEMPERATURE: 97.1 F | HEIGHT: 60 IN | RESPIRATION RATE: 13 BRPM | WEIGHT: 112 LBS | SYSTOLIC BLOOD PRESSURE: 82 MMHG | DIASTOLIC BLOOD PRESSURE: 52 MMHG | BODY MASS INDEX: 21.99 KG/M2

## 2019-03-13 VITALS
HEART RATE: 72 BPM | DIASTOLIC BLOOD PRESSURE: 50 MMHG | OXYGEN SATURATION: 96 % | SYSTOLIC BLOOD PRESSURE: 70 MMHG | RESPIRATION RATE: 16 BRPM

## 2019-03-13 DIAGNOSIS — K59.09 OTHER CONSTIPATION: ICD-10-CM

## 2019-03-13 DIAGNOSIS — K21.9 GASTRO-ESOPHAGEAL REFLUX DISEASE W/OUT ESOPHAGITIS: ICD-10-CM

## 2019-03-13 DIAGNOSIS — G23.1: ICD-10-CM

## 2019-03-13 DIAGNOSIS — N18.3 CHRONIC KIDNEY DISEASE, STAGE 3 (MODERATE): ICD-10-CM

## 2019-03-13 DIAGNOSIS — R26.9 UNSPECIFIED ABNORMALITIES OF GAIT AND MOBILITY: ICD-10-CM

## 2019-03-13 DIAGNOSIS — E03.9 HYPOTHYROIDISM, UNSPECIFIED: ICD-10-CM

## 2019-03-13 DIAGNOSIS — I95.1 ORTHOSTATIC HYPOTENSION: ICD-10-CM

## 2019-03-13 DIAGNOSIS — R41.3 OTHER AMNESIA: ICD-10-CM

## 2019-03-13 DIAGNOSIS — N40.0 BENIGN PROSTATIC HYPERPLASIA WITHOUT LOWER URINARY TRACT SYMPMS: ICD-10-CM

## 2019-03-13 PROCEDURE — 99213 OFFICE O/P EST LOW 20 MIN: CPT

## 2019-03-13 PROCEDURE — 99214 OFFICE O/P EST MOD 30 MIN: CPT

## 2019-03-13 RX ORDER — CEFUROXIME AXETIL 250 MG/1
250 TABLET ORAL
Qty: 14 | Refills: 0 | Status: DISCONTINUED | COMMUNITY
Start: 2019-02-08 | End: 2019-03-13

## 2019-03-13 RX ORDER — AZITHROMYCIN 250 MG/1
250 TABLET, FILM COATED ORAL
Qty: 4 | Refills: 0 | Status: DISCONTINUED | COMMUNITY
Start: 2019-02-08 | End: 2019-03-13

## 2019-03-13 RX ORDER — MIDODRINE HYDROCHLORIDE 5 MG/1
5 TABLET ORAL
Qty: 90 | Refills: 0 | Status: DISCONTINUED | COMMUNITY
Start: 2018-09-21 | End: 2019-03-13

## 2019-03-13 RX ORDER — MIRTAZAPINE 7.5 MG/1
7.5 TABLET, FILM COATED ORAL
Qty: 30 | Refills: 0 | Status: DISCONTINUED | COMMUNITY
Start: 2019-02-08 | End: 2019-03-13

## 2019-03-13 RX ORDER — ATORVASTATIN CALCIUM 10 MG/1
10 TABLET, FILM COATED ORAL
Qty: 1 | Refills: 1 | Status: ACTIVE | COMMUNITY
Start: 2018-01-02 | End: 1900-01-01

## 2019-03-13 NOTE — PHYSICAL EXAM
[Well Developed] : well developed [Normal Sclera/Conjunctiva] : normal sclera/conjunctiva [Normal Outer Ear/Nose] : the outer ears and nose were normal in appearance [Supple] : supple [Clear to Auscultation] : lungs were clear to auscultation bilaterally [Normal Rate] : normal rate  [Regular Rhythm] : with a regular rhythm [Normal S1, S2] : normal S1 and S2 [No Edema] : there was no peripheral edema [Soft] : abdomen soft [Non Tender] : non-tender [Non-distended] : non-distended [Normal Bowel Sounds] : normal bowel sounds [Normal Posterior Cervical Nodes] : no posterior cervical lymphadenopathy [Normal Anterior Cervical Nodes] : no anterior cervical lymphadenopathy [Grossly Normal Strength/Tone] : grossly normal strength/tone [No Focal Deficits] : no focal deficits [Normal Affect] : the affect was normal [Normal Mood] : the mood was normal [de-identified] : frail-appearing [de-identified] : fair inspiratory effort [de-identified] : sitting in a wheelchair

## 2019-03-13 NOTE — HISTORY OF PRESENT ILLNESS
[de-identified] : 87 yo male with PMH of HTN , HLD , BPH , Hypothyroidism , DJD , CKD stage 3 , Autonomic dysfunction and Orthostatic hypotension presents for medical follow-up. He is accompanied by his wife and son who provides supplemental history.  \par \par Since his last visit, he was hospitalized at [facility] last month for a mechanical fall after quickly getting up to answer the phone at home. He has been experiencing more pronounced balance problems and dizziness.  He has also been experiencing worsening memory loss.  Unable to obtain MRI due to pacemaker.  Recently evaluated by Neurology, started on Neurontin 100 mg TID since last visit.  On increased dose of Mirtazipine.\par \par Completed course of Cefuroxime and Azithromycin for bacterial bronchitis after discharge from the hospital.  Chronic SOB, denies chest pain.\par \par \par Discharge Medication:\par finasteride 5 mg oral tablet -- 1 tab(s) by mouth once a day\par aspirin 81 mg oral tablet, chewable -- 1 tab(s) by mouth once a day\par mirtazapine 7.5 mg oral tablet -- 1 tab(s) by mouth once a day (at bedtime)\par cefuroxime 250 mg oral tablet -- 1 tab(s) by mouth 2 times a day\par azithromycin 250 mg oral tablet -- 1 tab(s) by mouth once a day\par midodrine 10 mg oral tablet -- 1 tab(s) by mouth 3 times a day\par Lubricant Eye Drops ophthalmic solution -- 1 drop(s) to each affected eye every 4 hours\par omeprazole 40 mg oral delayed release capsule -- 1 cap(s) by mouth once a day\par levothyroxine 25 mcg (0.025 mg) oral tablet -- 1 tab(s) by mouth once a day\par Vitamin D2 50,000 intl units (1.25 mg) oral capsule -- 1 cap(s) by mouth once a week. \par

## 2019-03-13 NOTE — HISTORY OF PRESENT ILLNESS
[FreeTextEntry1] : Very pleasant 89-year-old gentleman presents for follow up of BPH with urinary obstruction. He continues to take fenestrated. He does not take Flomax anymore, as advised. He denies dysuria. No hematuria. He does report urinary frequency. Nocturia x3. No other complaints. [Urinary Retention] : no urinary retention [Urinary Urgency] : no urinary urgency [Urinary Frequency] : urinary frequency [Nocturia] : nocturia [Weak Stream] : weak stream [Dysuria] : no dysuria [Hematuria - Gross] : no gross hematuria [Bladder Spasm] : no bladder spasm [Abdominal Pain] : no abdominal pain [Flank Pain] : no flank pain [Fever] : no fever [Fatigue] : no fatigue [Nausea] : no nausea [Anorexia] : no anorexia

## 2019-03-13 NOTE — ASSESSMENT
[FreeTextEntry1] : Very pleasant 89-year-old gentleman who presents for followup of BPH with urinary obstruction\par -Continue finasteride\par -Followup in 6 months

## 2019-03-13 NOTE — ASSESSMENT
[FreeTextEntry1] : 89 year-old man with history of  HLD , BPH , Hypothyroidism , DJD , CKD stage 3 , Autonomic dysfunction and Orthostatic hypotension presents for medical follow-up.  His BP remains low, increase dose of Fludrocortisone from 0.2 mg daily to 0.3 mg daily.  Continue Midodrine 10 mg TID  Reviewed concerning symptoms for which he should seek immediate medical attention.Hospital labs reviewed, Lipid well controlled, will titrate dose of Atorvastin 20 mg daily to 10 mg daily.  The plan as ordered.

## 2019-05-02 ENCOUNTER — MEDICATION RENEWAL (OUTPATIENT)
Age: 84
End: 2019-05-02

## 2019-05-02 RX ORDER — OMEPRAZOLE 40 MG/1
40 CAPSULE, DELAYED RELEASE ORAL
Qty: 30 | Refills: 2 | Status: ACTIVE | COMMUNITY
Start: 2018-02-22 | End: 1900-01-01

## 2019-05-09 RX ORDER — ASPIRIN 81 MG/1
81 TABLET, CHEWABLE ORAL
Qty: 30 | Refills: 3 | Status: ACTIVE | COMMUNITY
Start: 1900-01-01 | End: 1900-01-01

## 2019-06-03 ENCOUNTER — APPOINTMENT (OUTPATIENT)
Dept: CARDIOLOGY | Facility: CLINIC | Age: 84
End: 2019-06-03
Payer: MEDICARE

## 2019-06-03 VITALS
HEIGHT: 60 IN | OXYGEN SATURATION: 90 % | TEMPERATURE: 97 F | SYSTOLIC BLOOD PRESSURE: 63 MMHG | DIASTOLIC BLOOD PRESSURE: 42 MMHG | BODY MASS INDEX: 21.99 KG/M2 | HEART RATE: 98 BPM | WEIGHT: 112 LBS

## 2019-06-03 DIAGNOSIS — E78.5 HYPERLIPIDEMIA, UNSPECIFIED: ICD-10-CM

## 2019-06-03 DIAGNOSIS — R55 SYNCOPE AND COLLAPSE: ICD-10-CM

## 2019-06-03 DIAGNOSIS — I95.9 HYPOTENSION, UNSPECIFIED: ICD-10-CM

## 2019-06-03 DIAGNOSIS — G90.9 DISORDER OF THE AUTONOMIC NERVOUS SYSTEM, UNSPECIFIED: ICD-10-CM

## 2019-06-03 PROCEDURE — 99214 OFFICE O/P EST MOD 30 MIN: CPT

## 2019-07-08 ENCOUNTER — MEDICATION RENEWAL (OUTPATIENT)
Age: 84
End: 2019-07-08

## 2019-07-08 RX ORDER — ERGOCALCIFEROL 1.25 MG/1
1.25 MG CAPSULE, LIQUID FILLED ORAL
Qty: 4 | Refills: 1 | Status: ACTIVE | COMMUNITY
Start: 2017-12-27 | End: 1900-01-01

## 2019-07-18 ENCOUNTER — APPOINTMENT (OUTPATIENT)
Dept: NEUROLOGY | Facility: CLINIC | Age: 84
End: 2019-07-18

## 2019-07-19 RX ORDER — FLUDROCORTISONE ACETATE 0.1 MG/1
0.1 TABLET ORAL
Qty: 90 | Refills: 3 | Status: ACTIVE | COMMUNITY
Start: 2018-10-11

## 2019-07-22 ENCOUNTER — RX RENEWAL (OUTPATIENT)
Age: 84
End: 2019-07-22

## 2019-07-22 RX ORDER — FINASTERIDE 5 MG/1
5 TABLET, FILM COATED ORAL
Qty: 30 | Refills: 2 | Status: ACTIVE | COMMUNITY
Start: 2019-07-22 | End: 1900-01-01

## 2019-08-06 ENCOUNTER — INPATIENT (INPATIENT)
Facility: HOSPITAL | Age: 84
LOS: 2 days | Discharge: HOSPICE | DRG: 640 | End: 2019-08-09
Attending: INTERNAL MEDICINE | Admitting: INTERNAL MEDICINE
Payer: MEDICARE

## 2019-08-06 VITALS
RESPIRATION RATE: 18 BRPM | OXYGEN SATURATION: 96 % | TEMPERATURE: 97 F | HEIGHT: 60 IN | HEART RATE: 88 BPM | DIASTOLIC BLOOD PRESSURE: 51 MMHG | WEIGHT: 119.93 LBS | SYSTOLIC BLOOD PRESSURE: 90 MMHG

## 2019-08-06 DIAGNOSIS — Z29.9 ENCOUNTER FOR PROPHYLACTIC MEASURES, UNSPECIFIED: ICD-10-CM

## 2019-08-06 DIAGNOSIS — R62.7 ADULT FAILURE TO THRIVE: ICD-10-CM

## 2019-08-06 DIAGNOSIS — E78.5 HYPERLIPIDEMIA, UNSPECIFIED: ICD-10-CM

## 2019-08-06 DIAGNOSIS — Z71.89 OTHER SPECIFIED COUNSELING: ICD-10-CM

## 2019-08-06 DIAGNOSIS — E03.9 HYPOTHYROIDISM, UNSPECIFIED: ICD-10-CM

## 2019-08-06 DIAGNOSIS — I95.9 HYPOTENSION, UNSPECIFIED: ICD-10-CM

## 2019-08-06 DIAGNOSIS — H10.33 UNSPECIFIED ACUTE CONJUNCTIVITIS, BILATERAL: ICD-10-CM

## 2019-08-06 DIAGNOSIS — I10 ESSENTIAL (PRIMARY) HYPERTENSION: ICD-10-CM

## 2019-08-06 LAB
ALBUMIN SERPL ELPH-MCNC: 2.8 G/DL — LOW (ref 3.5–5)
ALP SERPL-CCNC: 114 U/L — SIGNIFICANT CHANGE UP (ref 40–120)
ALT FLD-CCNC: 16 U/L DA — SIGNIFICANT CHANGE UP (ref 10–60)
ANION GAP SERPL CALC-SCNC: 4 MMOL/L — LOW (ref 5–17)
ANION GAP SERPL CALC-SCNC: 5 MMOL/L — SIGNIFICANT CHANGE UP (ref 5–17)
ANION GAP SERPL CALC-SCNC: 8 MMOL/L — SIGNIFICANT CHANGE UP (ref 5–17)
APTT BLD: 27.2 SEC — LOW (ref 27.5–36.3)
AST SERPL-CCNC: 35 U/L — SIGNIFICANT CHANGE UP (ref 10–40)
BASOPHILS # BLD AUTO: 0.03 K/UL — SIGNIFICANT CHANGE UP (ref 0–0.2)
BASOPHILS NFR BLD AUTO: 0.3 % — SIGNIFICANT CHANGE UP (ref 0–2)
BILIRUB SERPL-MCNC: 0.4 MG/DL — SIGNIFICANT CHANGE UP (ref 0.2–1.2)
BLD GP AB SCN SERPL QL: SIGNIFICANT CHANGE UP
BUN SERPL-MCNC: 30 MG/DL — HIGH (ref 7–18)
BUN SERPL-MCNC: 31 MG/DL — HIGH (ref 7–18)
BUN SERPL-MCNC: 32 MG/DL — HIGH (ref 7–18)
CALCIUM SERPL-MCNC: 8.7 MG/DL — SIGNIFICANT CHANGE UP (ref 8.4–10.5)
CALCIUM SERPL-MCNC: 8.8 MG/DL — SIGNIFICANT CHANGE UP (ref 8.4–10.5)
CALCIUM SERPL-MCNC: 9.4 MG/DL — SIGNIFICANT CHANGE UP (ref 8.4–10.5)
CHLORIDE SERPL-SCNC: 104 MMOL/L — SIGNIFICANT CHANGE UP (ref 96–108)
CHLORIDE SERPL-SCNC: 106 MMOL/L — SIGNIFICANT CHANGE UP (ref 96–108)
CHLORIDE SERPL-SCNC: 112 MMOL/L — HIGH (ref 96–108)
CK MB CFR SERPL CALC: <1 NG/ML — SIGNIFICANT CHANGE UP (ref 0–3.6)
CO2 SERPL-SCNC: 25 MMOL/L — SIGNIFICANT CHANGE UP (ref 22–31)
CO2 SERPL-SCNC: 27 MMOL/L — SIGNIFICANT CHANGE UP (ref 22–31)
CO2 SERPL-SCNC: 27 MMOL/L — SIGNIFICANT CHANGE UP (ref 22–31)
CREAT SERPL-MCNC: 0.92 MG/DL — SIGNIFICANT CHANGE UP (ref 0.5–1.3)
CREAT SERPL-MCNC: 1.05 MG/DL — SIGNIFICANT CHANGE UP (ref 0.5–1.3)
CREAT SERPL-MCNC: 1.07 MG/DL — SIGNIFICANT CHANGE UP (ref 0.5–1.3)
EOSINOPHIL # BLD AUTO: 0.41 K/UL — SIGNIFICANT CHANGE UP (ref 0–0.5)
EOSINOPHIL NFR BLD AUTO: 4.4 % — SIGNIFICANT CHANGE UP (ref 0–6)
GLUCOSE SERPL-MCNC: 105 MG/DL — HIGH (ref 70–99)
GLUCOSE SERPL-MCNC: 86 MG/DL — SIGNIFICANT CHANGE UP (ref 70–99)
GLUCOSE SERPL-MCNC: 90 MG/DL — SIGNIFICANT CHANGE UP (ref 70–99)
HCT VFR BLD CALC: 40.5 % — SIGNIFICANT CHANGE UP (ref 39–50)
HGB BLD-MCNC: 13 G/DL — SIGNIFICANT CHANGE UP (ref 13–17)
IMM GRANULOCYTES NFR BLD AUTO: 0.3 % — SIGNIFICANT CHANGE UP (ref 0–1.5)
INR BLD: 1.05 RATIO — SIGNIFICANT CHANGE UP (ref 0.88–1.16)
LACTATE SERPL-SCNC: 1.6 MMOL/L — SIGNIFICANT CHANGE UP (ref 0.7–2)
LACTATE SERPL-SCNC: 2.7 MMOL/L — HIGH (ref 0.7–2)
LIDOCAIN IGE QN: 150 U/L — SIGNIFICANT CHANGE UP (ref 73–393)
LYMPHOCYTES # BLD AUTO: 1.94 K/UL — SIGNIFICANT CHANGE UP (ref 1–3.3)
LYMPHOCYTES # BLD AUTO: 20.9 % — SIGNIFICANT CHANGE UP (ref 13–44)
MAGNESIUM SERPL-MCNC: 1.6 MG/DL — SIGNIFICANT CHANGE UP (ref 1.6–2.6)
MCHC RBC-ENTMCNC: 30.6 PG — SIGNIFICANT CHANGE UP (ref 27–34)
MCHC RBC-ENTMCNC: 32.1 GM/DL — SIGNIFICANT CHANGE UP (ref 32–36)
MCV RBC AUTO: 95.3 FL — SIGNIFICANT CHANGE UP (ref 80–100)
MONOCYTES # BLD AUTO: 0.56 K/UL — SIGNIFICANT CHANGE UP (ref 0–0.9)
MONOCYTES NFR BLD AUTO: 6 % — SIGNIFICANT CHANGE UP (ref 2–14)
NEUTROPHILS # BLD AUTO: 6.3 K/UL — SIGNIFICANT CHANGE UP (ref 1.8–7.4)
NEUTROPHILS NFR BLD AUTO: 68.1 % — SIGNIFICANT CHANGE UP (ref 43–77)
NRBC # BLD: 0 /100 WBCS — SIGNIFICANT CHANGE UP (ref 0–0)
NT-PROBNP SERPL-SCNC: 195 PG/ML — SIGNIFICANT CHANGE UP (ref 0–450)
PLATELET # BLD AUTO: 244 K/UL — SIGNIFICANT CHANGE UP (ref 150–400)
POTASSIUM SERPL-MCNC: 4.5 MMOL/L — SIGNIFICANT CHANGE UP (ref 3.5–5.3)
POTASSIUM SERPL-MCNC: 5.7 MMOL/L — HIGH (ref 3.5–5.3)
POTASSIUM SERPL-MCNC: 7.7 MMOL/L — CRITICAL HIGH (ref 3.5–5.3)
POTASSIUM SERPL-SCNC: 4.5 MMOL/L — SIGNIFICANT CHANGE UP (ref 3.5–5.3)
POTASSIUM SERPL-SCNC: 5.7 MMOL/L — HIGH (ref 3.5–5.3)
POTASSIUM SERPL-SCNC: 7.7 MMOL/L — CRITICAL HIGH (ref 3.5–5.3)
PROT SERPL-MCNC: 7.6 G/DL — SIGNIFICANT CHANGE UP (ref 6–8.3)
PROTHROM AB SERPL-ACNC: 11.7 SEC — SIGNIFICANT CHANGE UP (ref 10–12.9)
RBC # BLD: 4.25 M/UL — SIGNIFICANT CHANGE UP (ref 4.2–5.8)
RBC # FLD: 15.1 % — HIGH (ref 10.3–14.5)
SODIUM SERPL-SCNC: 137 MMOL/L — SIGNIFICANT CHANGE UP (ref 135–145)
SODIUM SERPL-SCNC: 137 MMOL/L — SIGNIFICANT CHANGE UP (ref 135–145)
SODIUM SERPL-SCNC: 144 MMOL/L — SIGNIFICANT CHANGE UP (ref 135–145)
T4 AB SER-ACNC: 6.6 UG/DL — SIGNIFICANT CHANGE UP (ref 4.6–12)
TROPONIN I SERPL-MCNC: <0.015 NG/ML — SIGNIFICANT CHANGE UP (ref 0–0.04)
TSH SERPL-MCNC: 7.9 UU/ML — HIGH (ref 0.34–4.82)
WBC # BLD: 9.27 K/UL — SIGNIFICANT CHANGE UP (ref 3.8–10.5)
WBC # FLD AUTO: 9.27 K/UL — SIGNIFICANT CHANGE UP (ref 3.8–10.5)

## 2019-08-06 PROCEDURE — 70450 CT HEAD/BRAIN W/O DYE: CPT | Mod: 26

## 2019-08-06 PROCEDURE — 99285 EMERGENCY DEPT VISIT HI MDM: CPT

## 2019-08-06 PROCEDURE — 71045 X-RAY EXAM CHEST 1 VIEW: CPT | Mod: 26

## 2019-08-06 RX ORDER — SODIUM CHLORIDE 9 MG/ML
1000 INJECTION INTRAMUSCULAR; INTRAVENOUS; SUBCUTANEOUS ONCE
Refills: 0 | Status: COMPLETED | OUTPATIENT
Start: 2019-08-06 | End: 2019-08-06

## 2019-08-06 RX ORDER — ATORVASTATIN CALCIUM 80 MG/1
10 TABLET, FILM COATED ORAL AT BEDTIME
Refills: 0 | Status: DISCONTINUED | OUTPATIENT
Start: 2019-08-06 | End: 2019-08-09

## 2019-08-06 RX ORDER — SODIUM CHLORIDE 9 MG/ML
1000 INJECTION INTRAMUSCULAR; INTRAVENOUS; SUBCUTANEOUS
Refills: 0 | Status: DISCONTINUED | OUTPATIENT
Start: 2019-08-06 | End: 2019-08-08

## 2019-08-06 RX ORDER — HEPARIN SODIUM 5000 [USP'U]/ML
5000 INJECTION INTRAVENOUS; SUBCUTANEOUS EVERY 8 HOURS
Refills: 0 | Status: DISCONTINUED | OUTPATIENT
Start: 2019-08-06 | End: 2019-08-09

## 2019-08-06 RX ORDER — GENTAMICIN SULFATE 3 MG/ML
2 SOLUTION/ DROPS OPHTHALMIC EVERY 4 HOURS
Refills: 0 | Status: DISCONTINUED | OUTPATIENT
Start: 2019-08-06 | End: 2019-08-06

## 2019-08-06 RX ORDER — ASPIRIN/CALCIUM CARB/MAGNESIUM 324 MG
81 TABLET ORAL DAILY
Refills: 0 | Status: DISCONTINUED | OUTPATIENT
Start: 2019-08-06 | End: 2019-08-09

## 2019-08-06 RX ORDER — OMEPRAZOLE 10 MG/1
1 CAPSULE, DELAYED RELEASE ORAL
Qty: 0 | Refills: 0 | DISCHARGE

## 2019-08-06 RX ORDER — FINASTERIDE 5 MG/1
5 TABLET, FILM COATED ORAL DAILY
Refills: 0 | Status: DISCONTINUED | OUTPATIENT
Start: 2019-08-06 | End: 2019-08-09

## 2019-08-06 RX ORDER — MIDODRINE HYDROCHLORIDE 2.5 MG/1
10 TABLET ORAL THREE TIMES A DAY
Refills: 0 | Status: DISCONTINUED | OUTPATIENT
Start: 2019-08-06 | End: 2019-08-09

## 2019-08-06 RX ORDER — LEVOTHYROXINE SODIUM 125 MCG
25 TABLET ORAL DAILY
Refills: 0 | Status: DISCONTINUED | OUTPATIENT
Start: 2019-08-06 | End: 2019-08-09

## 2019-08-06 RX ORDER — MIRTAZAPINE 45 MG/1
15 TABLET, ORALLY DISINTEGRATING ORAL AT BEDTIME
Refills: 0 | Status: DISCONTINUED | OUTPATIENT
Start: 2019-08-06 | End: 2019-08-09

## 2019-08-06 RX ORDER — CIPROFLOXACIN HCL 0.3 %
1 DROPS OPHTHALMIC (EYE) EVERY 4 HOURS
Refills: 0 | Status: DISCONTINUED | OUTPATIENT
Start: 2019-08-07 | End: 2019-08-09

## 2019-08-06 RX ADMIN — GENTAMICIN SULFATE 2 DROP(S): 3 SOLUTION/ DROPS OPHTHALMIC at 21:42

## 2019-08-06 RX ADMIN — SODIUM CHLORIDE 60 MILLILITER(S): 9 INJECTION INTRAMUSCULAR; INTRAVENOUS; SUBCUTANEOUS at 17:35

## 2019-08-06 RX ADMIN — MIDODRINE HYDROCHLORIDE 10 MILLIGRAM(S): 2.5 TABLET ORAL at 21:43

## 2019-08-06 RX ADMIN — SODIUM CHLORIDE 60 MILLILITER(S): 9 INJECTION INTRAMUSCULAR; INTRAVENOUS; SUBCUTANEOUS at 21:41

## 2019-08-06 RX ADMIN — MIRTAZAPINE 15 MILLIGRAM(S): 45 TABLET, ORALLY DISINTEGRATING ORAL at 23:44

## 2019-08-06 RX ADMIN — GENTAMICIN SULFATE 2 DROP(S): 3 SOLUTION/ DROPS OPHTHALMIC at 18:10

## 2019-08-06 RX ADMIN — ATORVASTATIN CALCIUM 10 MILLIGRAM(S): 80 TABLET, FILM COATED ORAL at 21:42

## 2019-08-06 RX ADMIN — MIDODRINE HYDROCHLORIDE 10 MILLIGRAM(S): 2.5 TABLET ORAL at 17:29

## 2019-08-06 RX ADMIN — HEPARIN SODIUM 5000 UNIT(S): 5000 INJECTION INTRAVENOUS; SUBCUTANEOUS at 21:42

## 2019-08-06 RX ADMIN — SODIUM CHLORIDE 1000 MILLILITER(S): 9 INJECTION INTRAMUSCULAR; INTRAVENOUS; SUBCUTANEOUS at 12:18

## 2019-08-06 RX ADMIN — GENTAMICIN SULFATE 2 DROP(S): 3 SOLUTION/ DROPS OPHTHALMIC at 14:55

## 2019-08-06 RX ADMIN — GENTAMICIN SULFATE 2 DROP(S): 3 SOLUTION/ DROPS OPHTHALMIC at 12:23

## 2019-08-06 NOTE — H&P ADULT - ASSESSMENT
89 year old male patient, from home, AAO x 1-2 at baseline,  wheelchair bound, with PMH significant for pacemaker placement,  dementia, CKD, hypotension on midodrine, depression presented to ED with c/o decreased PO intake, non healing left elbow and hand wound and chronic cough. Patient himself is a poor historian.  Patient wife reports that he developed non productive cough around February 8th and went to see his doctor who prescribed medications and was diagnosed with Bronchitis, however there is no improvement and he continues to have dry cough, she denies any fever, chills or shortness of breath or sick contacts. But he does have occasional chest pain and jumps off as if his pacemaker is "shocking him". His wounds were sustained after fall 2 weeks ago and have not healed completely   She also reported that for last few week his appetite has been extremely poor and she has to feed him. He also has b/l eye purulent discharge for last 3 weeks, often wakes up with crusted eyes and is not able to open them. But denies any pain, itching or changes in vision. She has been using OTC eye drops for pink eyes with no relief in symptoms.       Patient is admitted for Failure to Thrive. 89 year old male patient, from home, AAO x 1-2 at baseline,  wheelchair bound, with PMH significant for pacemaker placement,  dementia, CKD, hypotension on midodrine, depression presented to ED with c/o decreased PO intake, non healing left elbow and hand wound and chronic cough. Patient himself is a poor historian.  Patient wife reports that he developed non productive cough around February 8th and went to see his doctor who prescribed medications and was diagnosed with Bronchitis, however there is no improvement and he continues to have dry cough, she denies any fever, chills or shortness of breath or sick contacts. But he does have occasional chest pain and jumps off as if his pacemaker is "shocking him". His wounds were sustained after fall 2 weeks ago and have not healed completely   She also reported that for last few week his appetite has been extremely poor and she has to feed him. He also has b/l eye purulent discharge for last 3 weeks, often wakes up with crusted eyes and is not able to open them. But denies any pain, itching or changes in vision. She has been using OTC eye drops for pink eyes with no relief in symptoms.       Patient is admitted for Failure to Thrive.     89 y/o m admitted w/   1. Failure to thrive in the elderly  2. functional quadriplegia  3. dehydration  4. hypothyroidism  5. dementia  6. orthostatic hypotension  7. severe protein calorie malnutrition  8. conjunctivitis

## 2019-08-06 NOTE — H&P ADULT - PROBLEM SELECTOR PLAN 6
-Goals of care were discussed with patient's wife using , she would want every necessary medical intervention to be done for him at this time. She is not ready to make any final decisions. Patient remains FULL CODE for now.   -Palliative care team to follow.

## 2019-08-06 NOTE — H&P ADULT - HISTORY OF PRESENT ILLNESS
89 year old male patient, from home, AAO x 1-2 at baseline,  wheelchair bound, with PMH significant for AICD placement,  dementia, CKD, hypotension on midodrine, depression presented to ED with c/o decreased PO intake, non healing left elbow wound and chronic cough. Patient himself is a poor historian.  Patient wife reports that he developed non productive cough around February 8th and went to see his doctor who prescribed medications and was diagnosed with Bronchitis, however there is no improvement and he continues to have dry cough, she denies any fever, chills or shortness of breath or sick contacts. But he does have occasional chest pain and jumps off as if his AICD is "shocking him".   She also reported that for last few week his appetite has been extremely poor and she has to feed him. He also has b/l eye purulent discharge for last 3 weeks, often wakes up with crusted eyes and is not able to open them. But denies any pain, itching or changes in vision. She has been using OTC eye drops for pink eyes with no relief in symptoms. 89 year old male patient, from home, AAO x 1-2 at baseline,  wheelchair bound, with PMH significant for pacemaker placement,  dementia, CKD, hypotension on midodrine, depression presented to ED with c/o decreased PO intake, non healing left elbow and hand wound and chronic cough. Patient himself is a poor historian.  Patient wife reports that he developed non productive cough around February 8th and went to see his doctor who prescribed medications and was diagnosed with Bronchitis, however there is no improvement and he continues to have dry cough, she denies any fever, chills or shortness of breath or sick contacts. But he does have occasional chest pain and jumps off as if his pacemaker is "shocking him". His wounds were sustained after fall 2 weeks ago and have not healed completely   She also reported that for last few week his appetite has been extremely poor and she has to feed him. He also has b/l eye purulent discharge for last 3 weeks, often wakes up with crusted eyes and is not able to open them. But denies any pain, itching or changes in vision. She has been using OTC eye drops for pink eyes with no relief in symptoms.

## 2019-08-06 NOTE — H&P ADULT - PROBLEM SELECTOR PLAN 7
IMPROVE VTE score:  [ ] Previous VTE                                                3  [ ] Thrombophilia                                             2  [ ] Lower limb paralysis                                  2        (unable to hold up >15 seconds)    [ ] Current Cancer (within 6 months)            2   [x] Immobilization > 24 hrs                              1  [ ] ICU/CCU stay > 24 hours                            1  [x] Age > 60                                                         1  IMPROVE Score- 2  Heparin SC

## 2019-08-06 NOTE — ED ADULT TRIAGE NOTE - CHIEF COMPLAINT QUOTE
Pt candy in by wife via w/c with multiple c/o, pacemaker has been shocking him 2 x day, urinary frequency, reports drainage from right eye x 1 month, wound to left hand, speech unclear, poor appetite and sleeping more. See letter with wife

## 2019-08-06 NOTE — H&P ADULT - NSICDXPASTMEDICALHX_GEN_ALL_CORE_FT
PAST MEDICAL HISTORY:  Degenerative joint disease     Diabetes     HTN (hypertension)     Hyperlipidemia     Hypothyroid

## 2019-08-06 NOTE — ED PROVIDER NOTE - CARE PLAN
Principal Discharge DX:	Failure to thrive in adult  Secondary Diagnosis:	Acute bacterial conjunctivitis of both eyes Principal Discharge DX:	Failure to thrive in adult  Secondary Diagnosis:	Acute bacterial conjunctivitis of both eyes  Secondary Diagnosis:	AICD discharge

## 2019-08-06 NOTE — PATIENT PROFILE ADULT - INTERPRETATION SERVICES DECLINED
Patient/Caregiver requests family/friend to interpret./wife and son. Patient is confused and unable to answer questions

## 2019-08-06 NOTE — ED PROVIDER NOTE - PHYSICAL EXAMINATION
gen - elderly, frail  hent - dry mm  cv - rrr, left chest pace maker  resp - ctab, breathing comfortably  skin - wound on the left hand and the left elbow, look like healing abrasions. there is also a sacral pressure wound state ii and fluid filled blisters on the lower back  gi - soft, nt  msk - contracted upper and lower exts, muscle wasting  neuro - cognitive dulling. oriented to self, not place, time, or situation  endo - finger stick wnl  eyes - bl eye purulent discharge with some scleral injection. eomi, perrla

## 2019-08-06 NOTE — ED PROVIDER NOTE - CLINICAL SUMMARY MEDICAL DECISION MAKING FREE TEXT BOX
failure to thrive in an adult. likely needs nursing home placement. start abx for eyes. will need pacer interrogation.   I read ekg as sinus rhythm with 1st degree av block, incomplete right bundle, qs in iii, t inversions laterally, no st elevation or depression, pvcs noted, qtc 444, axis wnl.   start abx for conjunctivitis. failure to thrive in an adult. likely needs nursing home placement. start abx for eyes. will need pacer interrogation.   I read ekg as sinus rhythm with 1st degree av block, incomplete right bundle, qs in iii, t inversions laterally, no st elevation or depression, pvcs noted, qtc 444, axis wnl.   start abx for conjunctivitis.  attempted to interrogate pacemaker with [employer] but it did not read it. cannot find brand. will admit for cards eval.

## 2019-08-06 NOTE — H&P ADULT - PROBLEM SELECTOR PLAN 4
-Patient's BP runs low  -C/w midodrine home dose   -He was on Fludrocortisone temporarily in past but was later discontinued.

## 2019-08-06 NOTE — ED PROVIDER NOTE - OBJECTIVE STATEMENT
Pertinent PMH/PSH/FHx/SHx and Review of Systems contained within:  89M no known hx pw several problems per spouse. she seems most concerned about left hand and left elbow wounds that are not healing well. sustained approx 2 weeks ago by trauma. also notes the patient isnt eating as much in the past week. and potentially that his defibrillator is shocking him.  Fh and Sh not otherwise contributory  ROS otherwise negative Pertinent PMH/PSH/FHx/SHx and Review of Systems contained within:  89M hx dementia, ckd, hypotension on midodrine and depression pw several problems per spouse. she seems most concerned about left hand and left elbow wounds that are not healing well. sustained approx 2 weeks ago by trauma. also notes the patient isnt eating as much in the past week. and potentially that his defibrillator is shocking him.  Fh and Sh not otherwise contributory  ROS otherwise negative

## 2019-08-06 NOTE — H&P ADULT - ATTENDING COMMENTS
Patient seen/evaluated at bedside in the ED on 8/6/19 at 5pm. I agree with the resident H&P  note/outlined plan of care. My independent findings and conclusions are documented.    Pt's spouse interpreting in [place] at bedside with asistance of [place]  ID# [id number]    HPI/Pmhx/Meds/all/ros/famhx/sochx as per details of the H&P    1. Failure to thrive in the elderly  2. functional quadriplegia  3. dehydration  4. hypothyroidism  5. dementia  6. orthostatic hypotension  7. severe protein calorie malnutrition  8. conjunctivitis  9. hand/elbow abrasions  10. right upper chest wall rash    Pt is awake/alert, orientation to self only though appears significantly dehydrated. We need to assess for infectious process including urinalysis to exclude UTI  IVF hydration  thyroid function tests  nutrition consult, dysphagia diet  nutrition supplementation  palliative care consult  ciprofloxacin eye drops  hydrocortisone to right upper chest and neck  feed with assistance, frequent turns, aspiration precautions.

## 2019-08-06 NOTE — H&P ADULT - NSHPPHYSICALEXAM_GEN_ALL_CORE
CONSTITUTIONAL: Elderly, cachectic male, with contracted extremities   ENMT: Airway patent, Nasal mucosa clear. Mouth with dry mucosa.   EYES: Clear bilaterally, no discharge at present, pupils equal, round and reactive to light.  CARDIAC: Normal rate, regular rhythm.  Heart sounds S1, S2.  No murmurs, rubs or gallops   RESPIRATORY: Breath sounds clear and equal bilaterally. No wheezes, rhales or rhonchi  MUSCULOSKELETAL: Spine appears normal, ROM cannot be assessed   EXTREMITIES: No edema, cyanosis, b/l contacted all four extremities   NEUROLOGICAL: Alert and oriented x 1 to name only, no focal deficits, no motor or sensory deficits.  SKIN: left elbow wound, lacerated,  no discharge

## 2019-08-06 NOTE — ED ADULT NURSE NOTE - OBJECTIVE STATEMENT
patient presents to the ED with wife c/o poor appetite x 1 day, drainage in right eye, frequent urination. patient presents to the ED with wife c/o poor appetite x 1 day, drainage in right eye, frequent urination. left hand and arm wound noted.pt has pacemaker.

## 2019-08-06 NOTE — PATIENT PROFILE ADULT - LANGUAGE ASSISTANCE NEEDED
No-Patient/Caregiver offered and refused free interpretation services./wife and son. Patient is confused and unable to answer questions

## 2019-08-06 NOTE — ED ADULT NURSE NOTE - NSIMPLEMENTINTERV_GEN_ALL_ED
Implemented All Fall with Harm Risk Interventions:  [place] to call system. Call bell, personal items and telephone within reach. Instruct patient to call for assistance. Room bathroom lighting operational. Non-slip footwear when patient is off stretcher. Physically safe environment: no spills, clutter or unnecessary equipment. Stretcher in lowest position, wheels locked, appropriate side rails in place. Provide visual cue, wrist band, yellow gown, etc. Monitor gait and stability. Monitor for mental status changes and reorient to person, place, and time. Review medications for side effects contributing to fall risk. Reinforce activity limits and safety measures with patient and family. Provide visual clues: red socks.

## 2019-08-07 DIAGNOSIS — Z95.0 PRESENCE OF CARDIAC PACEMAKER: ICD-10-CM

## 2019-08-07 LAB
ALBUMIN SERPL ELPH-MCNC: 2.5 G/DL — LOW (ref 3.5–5)
ALP SERPL-CCNC: 102 U/L — SIGNIFICANT CHANGE UP (ref 40–120)
ALT FLD-CCNC: 13 U/L DA — SIGNIFICANT CHANGE UP (ref 10–60)
ANION GAP SERPL CALC-SCNC: 6 MMOL/L — SIGNIFICANT CHANGE UP (ref 5–17)
APPEARANCE UR: CLEAR — SIGNIFICANT CHANGE UP
AST SERPL-CCNC: 17 U/L — SIGNIFICANT CHANGE UP (ref 10–40)
BACTERIA # UR AUTO: ABNORMAL /HPF
BASOPHILS # BLD AUTO: 0.04 K/UL — SIGNIFICANT CHANGE UP (ref 0–0.2)
BASOPHILS NFR BLD AUTO: 0.7 % — SIGNIFICANT CHANGE UP (ref 0–2)
BILIRUB SERPL-MCNC: 0.5 MG/DL — SIGNIFICANT CHANGE UP (ref 0.2–1.2)
BILIRUB UR-MCNC: NEGATIVE — SIGNIFICANT CHANGE UP
BUN SERPL-MCNC: 25 MG/DL — HIGH (ref 7–18)
CALCIUM SERPL-MCNC: 9.2 MG/DL — SIGNIFICANT CHANGE UP (ref 8.4–10.5)
CHLORIDE SERPL-SCNC: 109 MMOL/L — HIGH (ref 96–108)
CHOLEST SERPL-MCNC: 122 MG/DL — SIGNIFICANT CHANGE UP (ref 10–199)
CO2 SERPL-SCNC: 26 MMOL/L — SIGNIFICANT CHANGE UP (ref 22–31)
COLOR SPEC: YELLOW — SIGNIFICANT CHANGE UP
CREAT SERPL-MCNC: 0.91 MG/DL — SIGNIFICANT CHANGE UP (ref 0.5–1.3)
DIFF PNL FLD: ABNORMAL
EOSINOPHIL # BLD AUTO: 0.27 K/UL — SIGNIFICANT CHANGE UP (ref 0–0.5)
EOSINOPHIL NFR BLD AUTO: 4.4 % — SIGNIFICANT CHANGE UP (ref 0–6)
EPI CELLS # UR: SIGNIFICANT CHANGE UP /HPF
GLUCOSE BLDC GLUCOMTR-MCNC: 88 MG/DL — SIGNIFICANT CHANGE UP (ref 70–99)
GLUCOSE SERPL-MCNC: 72 MG/DL — SIGNIFICANT CHANGE UP (ref 70–99)
GLUCOSE UR QL: NEGATIVE — SIGNIFICANT CHANGE UP
HBA1C BLD-MCNC: 5.8 % — HIGH (ref 4–5.6)
HCT VFR BLD CALC: 34.4 % — LOW (ref 39–50)
HDLC SERPL-MCNC: 45 MG/DL — SIGNIFICANT CHANGE UP
HGB BLD-MCNC: 11 G/DL — LOW (ref 13–17)
IMM GRANULOCYTES NFR BLD AUTO: 0.3 % — SIGNIFICANT CHANGE UP (ref 0–1.5)
INR BLD: 1.09 RATIO — SIGNIFICANT CHANGE UP (ref 0.88–1.16)
KETONES UR-MCNC: NEGATIVE — SIGNIFICANT CHANGE UP
LEUKOCYTE ESTERASE UR-ACNC: ABNORMAL
LIPID PNL WITH DIRECT LDL SERPL: 65 MG/DL — SIGNIFICANT CHANGE UP
LYMPHOCYTES # BLD AUTO: 1.27 K/UL — SIGNIFICANT CHANGE UP (ref 1–3.3)
LYMPHOCYTES # BLD AUTO: 20.8 % — SIGNIFICANT CHANGE UP (ref 13–44)
MAGNESIUM SERPL-MCNC: 1.7 MG/DL — SIGNIFICANT CHANGE UP (ref 1.6–2.6)
MCHC RBC-ENTMCNC: 30.3 PG — SIGNIFICANT CHANGE UP (ref 27–34)
MCHC RBC-ENTMCNC: 32 GM/DL — SIGNIFICANT CHANGE UP (ref 32–36)
MCV RBC AUTO: 94.8 FL — SIGNIFICANT CHANGE UP (ref 80–100)
MONOCYTES # BLD AUTO: 0.42 K/UL — SIGNIFICANT CHANGE UP (ref 0–0.9)
MONOCYTES NFR BLD AUTO: 6.9 % — SIGNIFICANT CHANGE UP (ref 2–14)
NEUTROPHILS # BLD AUTO: 4.08 K/UL — SIGNIFICANT CHANGE UP (ref 1.8–7.4)
NEUTROPHILS NFR BLD AUTO: 66.9 % — SIGNIFICANT CHANGE UP (ref 43–77)
NITRITE UR-MCNC: NEGATIVE — SIGNIFICANT CHANGE UP
NRBC # BLD: 0 /100 WBCS — SIGNIFICANT CHANGE UP (ref 0–0)
PH UR: 5 — SIGNIFICANT CHANGE UP (ref 5–8)
PHOSPHATE SERPL-MCNC: 3.3 MG/DL — SIGNIFICANT CHANGE UP (ref 2.5–4.5)
PLATELET # BLD AUTO: 226 K/UL — SIGNIFICANT CHANGE UP (ref 150–400)
POTASSIUM SERPL-MCNC: 4.3 MMOL/L — SIGNIFICANT CHANGE UP (ref 3.5–5.3)
POTASSIUM SERPL-SCNC: 4.3 MMOL/L — SIGNIFICANT CHANGE UP (ref 3.5–5.3)
PROT SERPL-MCNC: 6.3 G/DL — SIGNIFICANT CHANGE UP (ref 6–8.3)
PROT UR-MCNC: NEGATIVE — SIGNIFICANT CHANGE UP
PROTHROM AB SERPL-ACNC: 12.1 SEC — SIGNIFICANT CHANGE UP (ref 10–12.9)
RBC # BLD: 3.63 M/UL — LOW (ref 4.2–5.8)
RBC # FLD: 15.2 % — HIGH (ref 10.3–14.5)
RBC CASTS # UR COMP ASSIST: >50 /HPF (ref 0–2)
SODIUM SERPL-SCNC: 141 MMOL/L — SIGNIFICANT CHANGE UP (ref 135–145)
SP GR SPEC: 1.01 — SIGNIFICANT CHANGE UP (ref 1.01–1.02)
T3 SERPL-MCNC: 59 NG/DL — LOW (ref 80–200)
T4 AB SER-ACNC: 6.4 UG/DL — SIGNIFICANT CHANGE UP (ref 4.6–12)
T4 FREE SERPL-MCNC: 1 NG/DL — SIGNIFICANT CHANGE UP (ref 0.9–1.8)
TOTAL CHOLESTEROL/HDL RATIO MEASUREMENT: 2.7 RATIO — LOW (ref 3.4–9.6)
TRIGL SERPL-MCNC: 62 MG/DL — SIGNIFICANT CHANGE UP (ref 10–149)
UROBILINOGEN FLD QL: NEGATIVE — SIGNIFICANT CHANGE UP
VIT B12 SERPL-MCNC: 467 PG/ML — SIGNIFICANT CHANGE UP (ref 232–1245)
WBC # BLD: 6.1 K/UL — SIGNIFICANT CHANGE UP (ref 3.8–10.5)
WBC # FLD AUTO: 6.1 K/UL — SIGNIFICANT CHANGE UP (ref 3.8–10.5)
WBC UR QL: SIGNIFICANT CHANGE UP /HPF (ref 0–5)

## 2019-08-07 RX ORDER — SODIUM CHLORIDE 9 MG/ML
1000 INJECTION INTRAMUSCULAR; INTRAVENOUS; SUBCUTANEOUS
Refills: 0 | Status: DISCONTINUED | OUTPATIENT
Start: 2019-08-07 | End: 2019-08-08

## 2019-08-07 RX ORDER — HYDROCORTISONE 1 %
1 OINTMENT (GRAM) TOPICAL
Refills: 0 | Status: DISCONTINUED | OUTPATIENT
Start: 2019-08-07 | End: 2019-08-09

## 2019-08-07 RX ORDER — PANTOPRAZOLE SODIUM 20 MG/1
40 TABLET, DELAYED RELEASE ORAL
Refills: 0 | Status: DISCONTINUED | OUTPATIENT
Start: 2019-08-07 | End: 2019-08-09

## 2019-08-07 RX ADMIN — ATORVASTATIN CALCIUM 10 MILLIGRAM(S): 80 TABLET, FILM COATED ORAL at 21:30

## 2019-08-07 RX ADMIN — MIDODRINE HYDROCHLORIDE 10 MILLIGRAM(S): 2.5 TABLET ORAL at 21:29

## 2019-08-07 RX ADMIN — HEPARIN SODIUM 5000 UNIT(S): 5000 INJECTION INTRAVENOUS; SUBCUTANEOUS at 21:30

## 2019-08-07 RX ADMIN — Medication 1 DROP(S): at 02:33

## 2019-08-07 RX ADMIN — HEPARIN SODIUM 5000 UNIT(S): 5000 INJECTION INTRAVENOUS; SUBCUTANEOUS at 13:06

## 2019-08-07 RX ADMIN — SODIUM CHLORIDE 60 MILLILITER(S): 9 INJECTION INTRAMUSCULAR; INTRAVENOUS; SUBCUTANEOUS at 21:30

## 2019-08-07 RX ADMIN — Medication 1 DROP(S): at 13:06

## 2019-08-07 RX ADMIN — MIDODRINE HYDROCHLORIDE 10 MILLIGRAM(S): 2.5 TABLET ORAL at 05:51

## 2019-08-07 RX ADMIN — FINASTERIDE 5 MILLIGRAM(S): 5 TABLET, FILM COATED ORAL at 11:12

## 2019-08-07 RX ADMIN — Medication 1 DROP(S): at 05:59

## 2019-08-07 RX ADMIN — MIRTAZAPINE 15 MILLIGRAM(S): 45 TABLET, ORALLY DISINTEGRATING ORAL at 21:31

## 2019-08-07 RX ADMIN — Medication 1 APPLICATION(S): at 18:17

## 2019-08-07 RX ADMIN — Medication 81 MILLIGRAM(S): at 11:12

## 2019-08-07 RX ADMIN — Medication 25 MICROGRAM(S): at 05:51

## 2019-08-07 RX ADMIN — HEPARIN SODIUM 5000 UNIT(S): 5000 INJECTION INTRAVENOUS; SUBCUTANEOUS at 05:51

## 2019-08-07 RX ADMIN — SODIUM CHLORIDE 60 MILLILITER(S): 9 INJECTION INTRAMUSCULAR; INTRAVENOUS; SUBCUTANEOUS at 17:06

## 2019-08-07 RX ADMIN — Medication 1 DROP(S): at 17:05

## 2019-08-07 RX ADMIN — Medication 1 DROP(S): at 21:29

## 2019-08-07 RX ADMIN — Medication 1 DROP(S): at 11:12

## 2019-08-07 NOTE — DIETITIAN INITIAL EVALUATION ADULT. - OTHER INFO
Pt alert, awake, confused with dementia, wheelchair bound, from home; extremely poor intake x last few weeks, Failure to Thrive per MD; intake improving at present, 75% intake at breakfast today; Discussed with nursing; Pending swallow evaluation

## 2019-08-07 NOTE — ADVANCED PRACTICE NURSE CONSULT - RECOMMEDATIONS
-Clean all wounds with normal saline and apply skin prep to the surrounding skin  -Apply MediHoney ointment to the R. Elbow wound and cover with a Foam dressing Q 48hrs PRN  -Apply a Foam dressing to the Coccyx and R. Hip wounds Q 72hrs PRN  -Elevate/float the patients heels using heel protectors and reposition the patient Q 2hrs using wedges or pillows

## 2019-08-07 NOTE — DIETITIAN INITIAL EVALUATION ADULT. - ETIOLOGY
inadequate intake with chronic comorbidities, cognitive deficit; increased nutrition needs for wound healing

## 2019-08-07 NOTE — DIETITIAN INITIAL EVALUATION ADULT. - NUTRITIONGOAL OUTCOME1
To meet nutrition needs consistent with medical condition and goal of care; To promote wound healing

## 2019-08-07 NOTE — SWALLOW BEDSIDE ASSESSMENT ADULT - ORAL PREPARATORY PHASE
Pt observed talking while food was still in mouth./Anterior loss of bolus/Reduced oral grading poor pucker to cup presentation; Pt observed talking while food was still in mouth./Anterior loss of bolus Pt observed talking while food was still in mouth./Reduced oral grading Reduced oral grading

## 2019-08-07 NOTE — DIETITIAN INITIAL EVALUATION ADULT. - PHYSICAL APPEARANCE
other (specify)/debilitated/cachectic per MD multiple pressure ulcers: Stage I, II, III, DTI; s/p Wound Care Consult   Nutrition focused physical exam ( NFPE ) conducted with permission:    Subcutaneous fat loss: [ SEVERE ] Orbital fat pads region, [ MODERATE ]Buccal fat region, [ MODERATE ]Triceps region,  [ ]Ribs region.  Muscle wasting: [ SEVERE ]Temples region, [ MODERATE ]Clavicle region, [ MODERATE ]Shoulder region, [  ]Scapula region, [   ]Interosseous region,  [  ]thigh region, [  MODERATE  ]Calf region

## 2019-08-07 NOTE — PROGRESS NOTE ADULT - PROBLEM SELECTOR PLAN 1
Patient presenting with poor PO intake, he is cachectic  worsening dementia vs depression vs underlying infectious/metabolic  causes   -TSH elevated, start levothyroxine, patient used to on it but not seen in current medications brought by wife. T3 decreased 59  -Follow B12- wnl(467), RPR   Patient incontinent as per nurse- unable to obtain UA  -Speech and swallow evaluated, recommends: mechanical soft, thin liquids and supervised feedings Patient presenting with poor PO intake, he is cachectic  worsening dementia vs depression vs underlying infectious/metabolic  causes   -TSH elevated, start levothyroxine, patient used to on it but not seen in current medications brought by wife. T3 decreased 59  -Follow B12- wnl(467), RPR   Patient incontinent as per nurse- unable to obtain UA  -Speech and swallow evaluated, recommends: mechanical soft, thin liquids and supervised feedings. Patient able to eat and swallow, however,  -Nutrition consulted Patient presenting with poor PO intake, he is cachectic  worsening dementia vs depression vs underlying infectious/metabolic  causes   -TSH elevated, start levothyroxine, patient used to on it but not seen in current medications brought by wife. T3 decreased 59  -Follow B12- wnl(467), RPR   Patient incontinent as per nurse- unable to obtain UA  -Speech and swallow evaluated, recommends: mechanical soft, thin liquids and supervised feedings. Patient able to eat and swallow, however, seems to forget that he has food in his mouth

## 2019-08-07 NOTE — PROGRESS NOTE ADULT - PROBLEM SELECTOR PLAN 6
-TSH elevated  -Follow T3, T4  -start levothyroxine -TSH elevated  T3 slightly decreased  -start levothyroxine

## 2019-08-07 NOTE — SWALLOW BEDSIDE ASSESSMENT ADULT - SWALLOW EVAL: DIAGNOSIS
Pt p/w s&s oropharyngeal dysphagia w/ suspected neurogenic component, weak labial coordination, impaired bolus formation, reduced bolus management , slow A-P transport, oral stasis, delayed swallow trigger. No overt s/s aspiration on trials. Pt requires assisted feeding, forgets he has food in mouth.

## 2019-08-07 NOTE — CONSULT NOTE ADULT - ASSESSMENT
89 year old male patient, from home, AAO x 1-2 at baseline,  wheelchair bound, with PMH significant for pacemaker placement,  dementia, CKD, hypotension on midodrine, depression presented to ED with c/o decreased PO intake, non healing left elbow and hand wound and chronic cough.  1.Check PPM.  2.Hypotension-midodrine.  3.Depression-remeron.  4.IVF.  5.Cont asa,statin.  6.GI and DVT prophylaxis.

## 2019-08-07 NOTE — SWALLOW BEDSIDE ASSESSMENT ADULT - PHARYNGEAL PHASE
Delayed pharyngeal swallow/Multiple swallows Multiple swallows/Delayed pharyngeal swallow Delayed pharyngeal swallow

## 2019-08-07 NOTE — PROGRESS NOTE ADULT - PROBLEM SELECTOR PLAN 7
-Goals of care were discussed with patient's wife using , she would want every necessary medical intervention to be done for him at this time. She is not ready to make any final decisions. Patient remains FULL CODE for now.   -Palliative care team to follow. -Goals of care were discussed with patient's wife using , she would want every necessary medical intervention to be done for him at this time. She is not ready to make any final decisions. Patient remains FULL CODE for now.   -Palliative care team to follow, Dr. Pierce on board.

## 2019-08-07 NOTE — SWALLOW BEDSIDE ASSESSMENT ADULT - SLP PERTINENT HISTORY OF CURRENT PROBLEM
88 y/o male patient, from home, AAO x 1-2 at baseline,  wheelchair bound, with PMH significant for pacemaker placement, quadriplegia, dementia, CKD, hypotension on midodrine, depression presented to ED with c/o decreased PO intake, non healing left elbow and hand wound & chronic cough. Pt reported to have occasional chest pain and jumps off as if his pacemaker is "shocking him". His wounds were sustained after fall 2 weeks ago and have not healed completely. As per written note from wife (in binder chart), She also reported that for last few week his appetite has been extremely poor and she has to feed him. He also has b/l eye purulent discharge for last 3 weeks, often wakes up with crusted eyes and is not able to open them. Patient with dehydration, hypothyroidism, dementia, calorie deficient, conjunctivitis admitted with failure to thrive.

## 2019-08-07 NOTE — SWALLOW BEDSIDE ASSESSMENT ADULT - COMMENTS
A+Ox1, confused, legs appeared contracted. Exam given in [place], baseline mumbling, understood 60% of the time.

## 2019-08-07 NOTE — SWALLOW BEDSIDE ASSESSMENT ADULT - SWALLOW EVAL: RECOMMENDED FEEDING/EATING TECHNIQUES
oral hygiene/check mouth frequently for oral residue/pocketing/crush medication (when feasible)/small sips/bites/allow for swallow between intakes/alternate food with liquid/position upright (90 degrees)

## 2019-08-07 NOTE — DIETITIAN INITIAL EVALUATION ADULT. - REASON INDICATOR FOR ASSESSMENT
Nutrition consult requested for significant decrease of oral intake > 5d prior to admit Nutrition consult requested for assessment and significant decrease of oral intake > 5d prior to admit

## 2019-08-07 NOTE — CHART NOTE - NSCHARTNOTEFT_GEN_A_CORE
Upon Nutritional Assessment by the Registered Dietitian your patient was determined to meet criteria / has evidence of the following diagnosis/diagnoses:          [ ]  Mild Protein Calorie Malnutrition        [ ]  Moderate Protein Calorie Malnutrition        [ X ] Severe Protein Calorie Malnutrition        [ ] Unspecified Protein Calorie Malnutrition        [ ] Underweight / BMI <19        [ ] Morbid Obesity / BMI > 40      Findings as based on:  •  Comprehensive nutrition assessment and consultation  •  Calorie counts (nutrient intake analysis)  •  Food acceptance and intake status from observations by staff  •  Follow up  •  Patient education  •  Intervention secondary to interdisciplinary rounds  •   concerns      Treatment:    The following diet has been recommended: Add Ensure Pudding 120 ml x tid ( 510 kcal 12 g protein ) if medically feasible; MVI/minerals, Vit C supplements as medically feasible, Monitor needs for Zn supplement       PROVIDER Section:     By signing this assessment you are acknowledging and agree with the diagnosis/diagnoses assigned by the Registered Dietitian    Comments:

## 2019-08-07 NOTE — CHART NOTE - NSCHARTNOTEFT_GEN_A_CORE
Patient's PPM interrogated. Has [employer].  No findings. Patient has 7 years left on device. Patients interrogation report in patient's chart.

## 2019-08-07 NOTE — DIETITIAN INITIAL EVALUATION ADULT. - FACTORS AFF FOOD INTAKE
change in mental status/difficulty feeding self/difficulty swallowing/difficulty with food procurement/preparation/persistent lack of appetite/acute on chronic comorbidities/difficulty chewing/other (specify)/[employer]/ethnic/cultural/personal food preferences

## 2019-08-07 NOTE — PROGRESS NOTE ADULT - PROBLEM SELECTOR PLAN 5
-Patient's BP runs low  -C/w midodrine home dose   -He was on Fludrocortisone temporarily in past but was later discontinued. -Patient's BP runs low  -C/w midodrine home dose   BP stable on midodrine  -He was on Fludrocortisone temporarily in past but was later discontinued.

## 2019-08-07 NOTE — DIETITIAN INITIAL EVALUATION ADULT. - PROBLEM SELECTOR PLAN 1
-Patient is presenting with poor PO intake, he is cachectic, contracted extremities   -Could be secondary to worsening dementia , depression or underlying infectious/metabolic  causes

## 2019-08-07 NOTE — SWALLOW BEDSIDE ASSESSMENT ADULT - ASR SWALLOW ASPIRATION MONITOR
gurgly voice/oral hygiene/position upright (89Y)/fever/pneumonia/throat clearing/upper respiratory infection/change of breathing pattern/cough

## 2019-08-07 NOTE — DIETITIAN INITIAL EVALUATION ADULT. - PERTINENT LABORATORY DATA
08-07 Na141 mmol/L Glu 72 mg/dL K+ 4.3 mmol/L Cr  0.91 mg/dL BUN 25 mg/dL<H>   08-07 Phos 3.3 mg/dL   08-07 Alb 2.5 g/dL<L>       08-07 Chol 122 mg/dL LDL 65 mg/dL HDL 45 mg/dL Trig 62 mg/dL

## 2019-08-07 NOTE — ADVANCED PRACTICE NURSE CONSULT - ASSESSMENT
This is a 89yr old male patient admitted for Failure To Thrive, presenting with the following:  -There is a Stage 1 Pressure Injury to the Bilateral Heels and L. Hip, as evident by non-blanchable erythema  -There is an intact DTI to the R. Hip  -There is a Stage 2 Pressure Injury to the Coccyx (0.5cm x 0.5cm x 0.1cm) with pink tissue and scant drainage  -There is a Stage 2 Pressure Injury to the Coccyx, as evident by an intact fluid filled Bullae  -There is a Stage 3 Pressure Injury to the R. Elbow (4cm x 3cm x 0.1cnm) with red tissue, slough, and drainage  -There is evidence of a healing scabbed wound to the L. Elbow with areas of hypopigmentation

## 2019-08-07 NOTE — DIETITIAN INITIAL EVALUATION ADULT. - PERTINENT MEDS FT
MEDICATIONS  (STANDING):  aspirin  chewable 81 milliGRAM(s) Oral daily  atorvastatin 10 milliGRAM(s) Oral at bedtime  ciprofloxacin  0.3% Ophthalmic Solution 1 Drop(s) Both EYES every 4 hours  finasteride 5 milliGRAM(s) Oral daily  heparin  Injectable 5000 Unit(s) SubCutaneous every 8 hours  levothyroxine 25 MICROGram(s) Oral daily  midodrine 10 milliGRAM(s) Oral three times a day  mirtazapine 15 milliGRAM(s) Oral at bedtime  sodium chloride 0.9%. 1000 milliLiter(s) (60 mL/Hr) IV Continuous <Continuous>    MEDICATIONS  (PRN):

## 2019-08-07 NOTE — DIETITIAN INITIAL EVALUATION ADULT. - ADD RECOMMEND
Severe Malnutrition identified; Pending swallow evaluation; MVI/minerals, Vit C supplements as medically feasible, Monitor needs for Zn supplement

## 2019-08-07 NOTE — CONSULT NOTE ADULT - SUBJECTIVE AND OBJECTIVE BOX
CHIEF COMPLAINT:Patient is a 89y old  Male who presents with a chief complaint of decreased PO intake, left elbow wounds (06 Aug 2019 14:46)      HPI:  89 year old male patient, from home, AAO x 1-2 at baseline,  wheelchair bound, with PMH significant for pacemaker placement,  dementia, CKD, hypotension on midodrine, depression presented to ED with c/o decreased PO intake, non healing left elbow and hand wound and chronic cough. Patient himself is a poor historian.  Patient wife reports that he developed non productive cough around February 8th and went to see his doctor who prescribed medications and was diagnosed with Bronchitis, however there is no improvement and he continues to have dry cough, she denies any fever, chills or shortness of breath or sick contacts. But he does have occasional chest pain and jumps off as if his pacemaker is "shocking him". His wounds were sustained after fall 2 weeks ago and have not healed completely   She also reported that for last few week his appetite has been extremely poor and she has to feed him. He also has b/l eye purulent discharge for last 3 weeks, often wakes up with crusted eyes and is not able to open them. But denies any pain, itching or changes in vision. She has been using OTC eye drops for pink eyes with no relief in symptoms. (06 Aug 2019 14:46)      PAST MEDICAL & SURGICAL HISTORY:  Degenerative joint disease  Hyperlipidemia  HTN (hypertension)  Hypothyroid  Diabetes  S/P PPM-2017      MEDICATIONS  (STANDING):  aspirin  chewable 81 milliGRAM(s) Oral daily  atorvastatin 10 milliGRAM(s) Oral at bedtime  ciprofloxacin  0.3% Ophthalmic Solution 1 Drop(s) Both EYES every 4 hours  finasteride 5 milliGRAM(s) Oral daily  heparin  Injectable 5000 Unit(s) SubCutaneous every 8 hours  levothyroxine 25 MICROGram(s) Oral daily  midodrine 10 milliGRAM(s) Oral three times a day  mirtazapine 15 milliGRAM(s) Oral at bedtime  sodium chloride 0.9%. 1000 milliLiter(s) (60 mL/Hr) IV Continuous <Continuous>    MEDICATIONS  (PRN):      FAMILY HISTORY: No hx of CAD      SOCIAL HISTORY:    [x ] Non-smoker    [x ] Alcohol-denies    Allergies    No Known Allergies    Intolerances    	    REVIEW OF SYSTEMS:    [x ] Unable to obtain    PHYSICAL EXAM:  T(C): 36.4 (08-07-19 @ 07:17), Max: 36.9 (08-06-19 @ 23:37)  HR: 58 (08-07-19 @ 07:17) (58 - 99)  BP: 134/47 (08-07-19 @ 07:17) (90/51 - 147/66)  RR: 18 (08-07-19 @ 07:17) (16 - 18)  SpO2: 96% (08-07-19 @ 07:17) (96% - 100%)  Wt(kg): --  I&O's Summary      Appearance: Normal	  HEENT:   Normal oral mucosa, PERRL, EOMI	  Lymphatic: No lymphadenopathy  Cardiovascular: Normal S1 S2, No JVD, No murmurs, No edema  Respiratory: Lungs clear to auscultation	  Gastrointestinal:  Soft, Non-tender, + BS	  Skin: No rashes, No ecchymoses, No cyanosis	  Extremities: Normal range of motion, No clubbing, cyanosis or edema       	    ECG:  	Sinus rhythm with marked sinus arrhythmia with 1st degree A-V block with occasional Premature ventricular complexes  Incomplete right bundle branch block  Inferior infarct , age undetermined  ST & Marked T wave abnormality, consider anterolateral ischemia      	  	  LABS:	 	      CARDIAC MARKERS ( 06 Aug 2019 12:00 )  <0.015 ng/mL / x     / x     / x     / <1.0 ng/mL                              11.0   6.10  )-----------( 226      ( 07 Aug 2019 07:22 )             34.4     08-07    141  |  109<H>  |  25<H>  ----------------------------<  72  4.3   |  26  |  0.91    Ca    9.2      07 Aug 2019 07:22  Phos  3.3     08-07  Mg     1.7     08-07    TPro  6.3  /  Alb  2.5<L>  /  TBili  0.5  /  DBili  x   /  AST  17  /  ALT  13  /  AlkPhos  102  08-07    proBNP: Serum Pro-Brain Natriuretic Peptide: 195 pg/mL (08-06 @ 12:00)    Lipid Profile: Cholesterol 122  LDL 65  HDL 45  TG 62      TSH: Thyroid Stimulating Hormone, Serum: 7.90 uU/mL (08-06 @ 12:00)      EXAM:  CT BRAIN                            PROCEDURE DATE:  08/06/2019          INTERPRETATION:  Head CT without IV contrast     Clinical Indication: Altered mental status Technique: Axial CT images of   the head are obtained from the skull base tothe vertex without IV   contrast.    Comparison: 2/7/2019.    Findings: Mild motion artifact somewhat limits evaluation. There is no   gross acute intracranial hemorrhage. No gross CT evidence for an acute   territorial infarct. Stable patchy hypodensities in the periventricular   and subcortical white matter bilaterally, compatible with chronic small   vessel ischemic disease. There is no space-occupying lesion, midline   shift, or herniation. The ventricles maintain normal size, shape, and   location. The sulci are symmetric and normal for age bilaterally. No   gross extra-axial fluid collection. The gray-white differentiation is   preserved. The visualized paranasal sinuses and orbits appear   unremarkable.    Impression: No gross acute intracranial hemorrhage. No gross CT evidence   for an acute territorial infarct.    Chronic small vessel ischemic disease.    If clinically indicated, brain MR may be pursued for further evaluation.        EXAM:  XR CHEST PORTABLE IMMED 1V                            PROCEDURE DATE:  08/06/2019          INTERPRETATION:  Portable chest x-ray    Clinical Indication: Weakness    Comparison: 2/7/2019    Portable chest x-ray is acquired for evaluation.    Impression: The left lung apex is excluded from the radiograph. No gross   acute pulmonary infiltrate, pleural effusion, or pneumothorax. Skinfold   on the right.    The trachea is midline.     The cardiac silhouette is within normal limits.     No pulmonary vascular congestion.    Stable left cardiac device.

## 2019-08-08 DIAGNOSIS — Z51.5 ENCOUNTER FOR PALLIATIVE CARE: ICD-10-CM

## 2019-08-08 DIAGNOSIS — F03.90 UNSPECIFIED DEMENTIA WITHOUT BEHAVIORAL DISTURBANCE: ICD-10-CM

## 2019-08-08 DIAGNOSIS — E43 UNSPECIFIED SEVERE PROTEIN-CALORIE MALNUTRITION: ICD-10-CM

## 2019-08-08 DIAGNOSIS — R53.2 FUNCTIONAL QUADRIPLEGIA: ICD-10-CM

## 2019-08-08 LAB
ANION GAP SERPL CALC-SCNC: 7 MMOL/L — SIGNIFICANT CHANGE UP (ref 5–17)
BUN SERPL-MCNC: 23 MG/DL — HIGH (ref 7–18)
CALCIUM SERPL-MCNC: 9.1 MG/DL — SIGNIFICANT CHANGE UP (ref 8.4–10.5)
CHLORIDE SERPL-SCNC: 111 MMOL/L — HIGH (ref 96–108)
CO2 SERPL-SCNC: 25 MMOL/L — SIGNIFICANT CHANGE UP (ref 22–31)
CREAT SERPL-MCNC: 0.76 MG/DL — SIGNIFICANT CHANGE UP (ref 0.5–1.3)
GLUCOSE BLDC GLUCOMTR-MCNC: 105 MG/DL — HIGH (ref 70–99)
GLUCOSE BLDC GLUCOMTR-MCNC: 120 MG/DL — HIGH (ref 70–99)
GLUCOSE SERPL-MCNC: 76 MG/DL — SIGNIFICANT CHANGE UP (ref 70–99)
HCT VFR BLD CALC: 34.1 % — LOW (ref 39–50)
HGB BLD-MCNC: 11 G/DL — LOW (ref 13–17)
MCHC RBC-ENTMCNC: 30.3 PG — SIGNIFICANT CHANGE UP (ref 27–34)
MCHC RBC-ENTMCNC: 32.3 GM/DL — SIGNIFICANT CHANGE UP (ref 32–36)
MCV RBC AUTO: 93.9 FL — SIGNIFICANT CHANGE UP (ref 80–100)
NRBC # BLD: 0 /100 WBCS — SIGNIFICANT CHANGE UP (ref 0–0)
PLATELET # BLD AUTO: 226 K/UL — SIGNIFICANT CHANGE UP (ref 150–400)
POTASSIUM SERPL-MCNC: 3.5 MMOL/L — SIGNIFICANT CHANGE UP (ref 3.5–5.3)
POTASSIUM SERPL-SCNC: 3.5 MMOL/L — SIGNIFICANT CHANGE UP (ref 3.5–5.3)
RBC # BLD: 3.63 M/UL — LOW (ref 4.2–5.8)
RBC # FLD: 15 % — HIGH (ref 10.3–14.5)
SODIUM SERPL-SCNC: 143 MMOL/L — SIGNIFICANT CHANGE UP (ref 135–145)
WBC # BLD: 6.26 K/UL — SIGNIFICANT CHANGE UP (ref 3.8–10.5)
WBC # FLD AUTO: 6.26 K/UL — SIGNIFICANT CHANGE UP (ref 3.8–10.5)

## 2019-08-08 PROCEDURE — 93010 ELECTROCARDIOGRAM REPORT: CPT

## 2019-08-08 PROCEDURE — 99223 1ST HOSP IP/OBS HIGH 75: CPT

## 2019-08-08 RX ADMIN — MIRTAZAPINE 15 MILLIGRAM(S): 45 TABLET, ORALLY DISINTEGRATING ORAL at 21:40

## 2019-08-08 RX ADMIN — HEPARIN SODIUM 5000 UNIT(S): 5000 INJECTION INTRAVENOUS; SUBCUTANEOUS at 21:40

## 2019-08-08 RX ADMIN — Medication 81 MILLIGRAM(S): at 12:05

## 2019-08-08 RX ADMIN — Medication 1 DROP(S): at 17:15

## 2019-08-08 RX ADMIN — SODIUM CHLORIDE 60 MILLILITER(S): 9 INJECTION INTRAMUSCULAR; INTRAVENOUS; SUBCUTANEOUS at 05:42

## 2019-08-08 RX ADMIN — ATORVASTATIN CALCIUM 10 MILLIGRAM(S): 80 TABLET, FILM COATED ORAL at 21:40

## 2019-08-08 RX ADMIN — Medication 1 DROP(S): at 05:46

## 2019-08-08 RX ADMIN — FINASTERIDE 5 MILLIGRAM(S): 5 TABLET, FILM COATED ORAL at 12:05

## 2019-08-08 RX ADMIN — Medication 1 DROP(S): at 13:53

## 2019-08-08 RX ADMIN — Medication 25 MICROGRAM(S): at 05:43

## 2019-08-08 RX ADMIN — HEPARIN SODIUM 5000 UNIT(S): 5000 INJECTION INTRAVENOUS; SUBCUTANEOUS at 05:43

## 2019-08-08 RX ADMIN — MIDODRINE HYDROCHLORIDE 10 MILLIGRAM(S): 2.5 TABLET ORAL at 13:54

## 2019-08-08 RX ADMIN — HEPARIN SODIUM 5000 UNIT(S): 5000 INJECTION INTRAVENOUS; SUBCUTANEOUS at 13:53

## 2019-08-08 RX ADMIN — Medication 1 DROP(S): at 21:41

## 2019-08-08 RX ADMIN — Medication 1 APPLICATION(S): at 05:43

## 2019-08-08 RX ADMIN — Medication 1 APPLICATION(S): at 17:15

## 2019-08-08 RX ADMIN — Medication 1 DROP(S): at 10:06

## 2019-08-08 RX ADMIN — MIDODRINE HYDROCHLORIDE 10 MILLIGRAM(S): 2.5 TABLET ORAL at 05:43

## 2019-08-08 RX ADMIN — Medication 1 DROP(S): at 02:12

## 2019-08-08 RX ADMIN — MIDODRINE HYDROCHLORIDE 10 MILLIGRAM(S): 2.5 TABLET ORAL at 21:40

## 2019-08-08 NOTE — CHART NOTE - NSCHARTNOTEFT_GEN_A_CORE
Reassessment: Nutrition consult requested for assessment on 19    89yMalePatient is a 89y old  Male who presents with a chief complaint of decreased PO intake, left elbow wounds (08 Aug 2019 08:17)      Factors impacting intake: [ ] none [ ] nausea  [ ] vomiting [ ] diarrhea [ ] constipation  [ ]chewing problems [ ] swallowing issues  [ x] other:     Diet Presciption: Diet, Dysphagia 1 Pureed-Nectar Consistency Fluid:   Supplement Feeding Modality:  Oral  Ensure Pudding Cans or Servings Per Day:  1       Frequency:  Three Times a day (19 @ 10:29)    Intake: Pt. seen & initial assessment done on 19 by RD with diagnosis of severe PCM noted. Visited pt. debilitated, d/w PCA, pt. ate breakfast well, intake 50% with feeding assistance, no reports GI distress, encourage po intake with feeding assistance, wound care noted, d/w RN, rec. c/w  Dysphagia 1 Pureed-Nectar Consistency Fluid & Ensure Pudding 120ml x tid (510kcal 12g protein) as medically feasible. RD available.        Daily Weight in k.8 (08 Aug 2019 04:39)  Weight in k.9 (07 Aug 2019 10:08)  Weight in k.9 (07 Aug 2019 10:08)    % Weight Change    Pertinent Medications: MEDICATIONS  (STANDING):  aspirin  chewable 81 milliGRAM(s) Oral daily  atorvastatin 10 milliGRAM(s) Oral at bedtime  ciprofloxacin  0.3% Ophthalmic Solution 1 Drop(s) Both EYES every 4 hours  finasteride 5 milliGRAM(s) Oral daily  heparin  Injectable 5000 Unit(s) SubCutaneous every 8 hours  hydrocortisone 1% Cream 1 Application(s) Topical two times a day  levothyroxine 25 MICROGram(s) Oral daily  midodrine 10 milliGRAM(s) Oral three times a day  mirtazapine 15 milliGRAM(s) Oral at bedtime  pantoprazole    Tablet 40 milliGRAM(s) Oral before breakfast    MEDICATIONS  (PRN):    Pertinent Labs:  Na143 mmol/L Glu 76 mg/dL K+ 3.5 mmol/L Cr  0.76 mg/dL BUN 23 mg/dL<H>  Phos 3.3 mg/dL  Alb 2.5 g/dL<L> 08-07 [id number] 5.8 %<H> 08-07 Chol 122 mg/dL LDL 65 mg/dL HDL 45 mg/dL Trig 62 mg/dL     CAPILLARY BLOOD GLUCOSE      POCT Blood Glucose.: 88 mg/dL (07 Aug 2019 21:31)    Skin: Multiple pressure ulcers     Estimated Needs:   [X ] no change since previous assessment  [ ] recalculated:     Previous Nutrition Diagnosis:   [ ] Inadequate Energy Intake [ ]Inadequate Oral Intake [ ] Excessive Energy Intake   [ ] Underweight [ ] Increased Nutrient Needs [ ] Overweight/Obesity   [ ] Altered GI Function [ ] Unintended Weight Loss [ ] Food & Nutrition Related Knowledge Deficit [Severe] Malnutrition     Nutrition Diagnosis is [X ] ongoing  [ ] resolved [ ] not applicable     New Nutrition Diagnosis: [ ] not applicable       Interventions: To meet nutrition needs   Recommend  [ ] Change Diet To:  [ ] Nutrition Supplement  [ ] Nutrition Support  [ ] Other: rec. adding MVI/minerals/Vit.C 500mg BID for wound healing & ZnSO 220mg as needed as medically feasible.    Monitoring and Evaluation:   [X ] PO intake [ x ] Tolerance to diet prescription [ x ] weights [ x ] labs[ x ] follow up per protocol  [ ] other:

## 2019-08-08 NOTE — PROGRESS NOTE ADULT - ATTENDING COMMENTS
Patient seen/evaluated at bedside 8/8/19. I agree with the resident progress note/outlined plan of care. My independent findings and conclusions are documented.    Pt ate most of breakfast but missed lunch. Has no specific complaints    89 y/o m w/  1. Failure to thrive in the elderly  2. functional quadriplegia with limb contractures  3. dehydration  4. hypothyroidism  5. dementia  6. orthostatic hypotension  7. severe protein calorie malnutrition  8. conjunctivitis  9. hand/elbow abrasions  10. right upper chest wall rash  11. decub ulcers of R elbow, sacrum: present on admission  12 h/o PPM ([employer]) s/p interrogation    Pt is awake/alert, orientation to self only though appears significantly dehydrated. Urinalysis is negative for evidence of UTI  IVF hydration  thyroid function tests  nutrition consult, dysphagia diet  nutrition supplementation  palliative care consult appreciated; service discussed with son. Per my discussion with spouse she does not appear to comprehend the progressive nature of his disease  ciprofloxacin eye drops  hydrocortisone to right upper chest and neck  feed with assistance, frequent turns, aspiration precautions.  case mgt consult, social work consult for spouse support services referral   discharge planning, prepare for discharge
Patient seen/evaluated at bedside 8/7/19. I agree with the resident progress note/outlined plan of care. My independent findings and conclusions are documented.     ID #[id number]  Pt responds to name, but is unable to carry on spontaneous conversation. Oriented to self only  reports discomfort around his rectum.  Attempts to follow 1 step commands (ie will take deep breaths on request)    1. Failure to thrive in the elderly  2. functional quadriplegia with limb contractures  3. dehydration  4. hypothyroidism  5. dementia  6. orthostatic hypotension  7. severe protein calorie malnutrition  8. conjunctivitis  9. hand/elbow abrasions  10. right upper chest wall rash    Pt is awake/alert, orientation to self only though appears significantly dehydrated. We need to assess for infectious process including urinalysis to exclude UTI  IVF hydration  thyroid function tests  nutrition consult, dysphagia diet  nutrition supplementation  palliative care consult; spouse does not appear to comprehend the progressive nature of his disease  ciprofloxacin eye drops  hydrocortisone to right upper chest and neck  feed with assistance, frequent turns, aspiration precautions.  case mgt consult, social work consult for spouse support services referral

## 2019-08-08 NOTE — CONSULT NOTE ADULT - PROBLEM SELECTOR RECOMMENDATION 3
2/2 advanced dementia, freq positioning. Dependent on all ADLs. Wife and son care for patient at home.

## 2019-08-08 NOTE — CONSULT NOTE ADULT - PROBLEM SELECTOR RECOMMENDATION 4
Unable to reach patient's wife by phone, no one at bedside. Spoke with patient's son Angelo by phone regarding current condition, trajectory of illness, goals of care. He expressed that he and his mother make medical decisions for the patient. Given that patient's debilitated state, advanced dementia, patient is eligible for hospice services at home, hospice philosophy discussed. Discussed risks/benefits of CPR/intubation in the context of his end stage dementia. He verbalized understanding and stated that he would speak with his mother. He was agreeable that I f/u tomorrow. All questions answered, support provided. Palliative will follow.

## 2019-08-08 NOTE — CONSULT NOTE ADULT - SUBJECTIVE AND OBJECTIVE BOX
HPI:  89 year old male patient, from home, AAO x 1-2 at baseline,  wheelchair bound, with PMH significant for pacemaker placement,  dementia, CKD, hypotension on midodrine, depression presented to ED with c/o decreased PO intake, non healing left elbow and hand wound and chronic cough. Patient himself is a poor historian.  Patient wife reports that he developed non productive cough around  and went to see his doctor who prescribed medications and was diagnosed with Bronchitis, however there is no improvement and he continues to have dry cough, she denies any fever, chills or shortness of breath or sick contacts. But he does have occasional chest pain and jumps off as if his pacemaker is "shocking him". His wounds were sustained after fall 2 weeks ago and have not healed completely   She also reported that for last few week his appetite has been extremely poor and she has to feed him. He also has b/l eye purulent discharge for last 3 weeks, often wakes up with crusted eyes and is not able to open them. But denies any pain, itching or changes in vision. She has been using OTC eye drops for pink eyes with no relief in symptoms. (06 Aug 2019 14:46)    Interval history: no overnight events. Patient mumbling, confused. Full code on file. Palliative consulted to address goals of care.       PAST MEDICAL & SURGICAL HISTORY:  Degenerative joint disease  Hyperlipidemia  HTN (hypertension)  Hypothyroid  Diabetes  No significant past surgical history      SOCIAL HISTORY:  , has 2 children  Admitted from:  home, lives w wife, son   Preferred Language: Slovak    Surrogate/HCP/Guardian:            Phone#:    FAMILY HISTORY:  No pertinent family history    Baseline ADLs (prior to admission):    Allergies    No Known Allergies    Intolerances      Present Symptoms:   Dyspnea:   Nausea/Vomiting:   Anxiety:  Depressed   Fatigue:  Loss of appetite:   Pain:                                location:          Review of Systems: [All others negative or Unable to obtain due to poor mentation]    MEDICATIONS  (STANDING):  aspirin  chewable 81 milliGRAM(s) Oral daily  atorvastatin 10 milliGRAM(s) Oral at bedtime  ciprofloxacin  0.3% Ophthalmic Solution 1 Drop(s) Both EYES every 4 hours  finasteride 5 milliGRAM(s) Oral daily  heparin  Injectable 5000 Unit(s) SubCutaneous every 8 hours  hydrocortisone 1% Cream 1 Application(s) Topical two times a day  levothyroxine 25 MICROGram(s) Oral daily  midodrine 10 milliGRAM(s) Oral three times a day  mirtazapine 15 milliGRAM(s) Oral at bedtime  pantoprazole    Tablet 40 milliGRAM(s) Oral before breakfast    MEDICATIONS  (PRN):      PHYSICAL EXAM:    Vital Signs Last 24 Hrs  T(C): 37.1 (08 Aug 2019 11:16), Max: 37.2 (07 Aug 2019 23:47)  T(F): 98.7 (08 Aug 2019 11:16), Max: 99 (07 Aug 2019 23:47)  HR: 79 (08 Aug 2019 11:16) (69 - 83)  BP: 131/69 (08 Aug 2019 11:16) (108/69 - 134/62)  BP(mean): --  RR: 18 (08 Aug 2019 11:16) (17 - 18)  SpO2: 98% (08 Aug 2019 11:16) (95% - 99%)    General: alert  oriented x ____    [lethargic distressed cachexia  nonverbal  unarousable verbal]  Karnofsky Performance Score/Palliative Performance Status Version2:     %    HEENT: normal  dry mouth  ET tube/trach oral lesions:  Lungs: comfortable tachypnea/labored breathing  excessive secretions  CV: normal  tachycardia  GI: normal  distended  tender  incontinent               PEG/NG/OG tube  constipation  last BM:   : normal  incontinent  oliguria/anuria  jarrett  Musculoskeletal: normal  weakness  edema             ambulatory  bedbound/wheelchair bound  Skin: normal  pressure ulcers: stage: edema: other:  Neuro: no deficits cognitive impairment dsyphagia/dysarthria paresis: other:  Oral intake ability: unable/only mouth care [minimal moderate full capability]  Diet: [NPO]    LABS:                        11.0   6.26  )-----------( 226      ( 08 Aug 2019 07:25 )             34.1     08-    143  |  111<H>  |  23<H>  ----------------------------<  76  3.5   |  25  |  0.76    Ca    9.1      08 Aug 2019 07:25  Phos  3.3     08-  Mg     1.7     -    TPro  6.3  /  Alb  2.5<L>  /  TBili  0.5  /  DBili  x   /  AST  17  /  ALT  13  /  AlkPhos  102  08-    Urinalysis Basic - ( 07 Aug 2019 16:07 )    Color: Yellow / Appearance: Clear / S.015 / pH: x  Gluc: x / Ketone: Negative  / Bili: Negative / Urobili: Negative   Blood: x / Protein: Negative / Nitrite: Negative   Leuk Esterase: Trace / RBC: >50 /HPF / WBC 3-5 /HPF   Sq Epi: x / Non Sq Epi: Few /HPF / Bacteria: Moderate /HPF        RADIOLOGY & ADDITIONAL STUDIES:    ADVANCE DIRECTIVES: HPI:  89 year old male patient, from home, AAO x 1-2 at baseline,  wheelchair bound, with PMH significant for pacemaker placement,  dementia, CKD, hypotension on midodrine, depression presented to ED with c/o decreased PO intake, non healing left elbow and hand wound and chronic cough. Patient himself is a poor historian.  Patient wife reports that he developed non productive cough around  and went to see his doctor who prescribed medications and was diagnosed with Bronchitis, however there is no improvement and he continues to have dry cough, she denies any fever, chills or shortness of breath or sick contacts. But he does have occasional chest pain and jumps off as if his pacemaker is "shocking him". His wounds were sustained after fall 2 weeks ago and have not healed completely   She also reported that for last few week his appetite has been extremely poor and she has to feed him. He also has b/l eye purulent discharge for last 3 weeks, often wakes up with crusted eyes and is not able to open them. But denies any pain, itching or changes in vision. She has been using OTC eye drops for pink eyes with no relief in symptoms. (06 Aug 2019 14:46)    Interval history: no overnight events. Patient mumbling, confused. Full code on file. Palliative consulted to address goals of care.       PAST MEDICAL & SURGICAL HISTORY:  Degenerative joint disease  Hyperlipidemia  HTN (hypertension)  Hypothyroid  Diabetes  No significant past surgical history      SOCIAL HISTORY:  , has 2 children  Admitted from:  home, lives w wife, son   Preferred Language: Amharic    Surrogate/HCP/Guardian:   Franchesca Connelly   spouse      Phone#: [phone]  Angelo Connelly son     FAMILY HISTORY:  No pertinent family history    Baseline ADLs (prior to admission): nonambulatory, minimally verbal    Allergies    No Known Allergies    Intolerances      Present Symptoms:           Review of Systems:   Unable to obtain due to poor mentation     MEDICATIONS  (STANDING):  aspirin  chewable 81 milliGRAM(s) Oral daily  atorvastatin 10 milliGRAM(s) Oral at bedtime  ciprofloxacin  0.3% Ophthalmic Solution 1 Drop(s) Both EYES every 4 hours  finasteride 5 milliGRAM(s) Oral daily  heparin  Injectable 5000 Unit(s) SubCutaneous every 8 hours  hydrocortisone 1% Cream 1 Application(s) Topical two times a day  levothyroxine 25 MICROGram(s) Oral daily  midodrine 10 milliGRAM(s) Oral three times a day  mirtazapine 15 milliGRAM(s) Oral at bedtime  pantoprazole    Tablet 40 milliGRAM(s) Oral before breakfast    MEDICATIONS  (PRN):      PHYSICAL EXAM:    Vital Signs Last 24 Hrs  T(C): 37.1 (08 Aug 2019 11:16), Max: 37.2 (07 Aug 2019 23:47)  T(F): 98.7 (08 Aug 2019 11:16), Max: 99 (07 Aug 2019 23:47)  HR: 79 (08 Aug 2019 11:16) (69 - 83)  BP: 131/69 (08 Aug 2019 11:16) (108/69 - 134/62)  BP(mean): --  RR: 18 (08 Aug 2019 11:16) (17 - 18)  SpO2: 98% (08 Aug 2019 11:16) (95% - 99%)     Karnofsky Performance Score/Palliative Performance Status Version2: 40   %     GENERAL: alert, frail,  NAD  HEENT: Atraumatic, oropharynx clear, neck supple  CHEST/LUNG: unlabored  HEART: Regular rate and rhythm    ABDOMEN: Soft, Nontender, Nondistended   MUSCULOSKELETAL:  No  edema,  bedbound  NERVOUS SYSTEM:  Alert & Oriented X1,  follows simple commands  SKIN: Stage 2 coccyx, Stage 3 elbow noted  Oral intake: poor       LABS:                        11.0   6.26  )-----------( 226      ( 08 Aug 2019 07:25 )             34.1     08-    143  |  111<H>  |  23<H>  ----------------------------<  76  3.5   |  25  |  0.76    Ca    9.1      08 Aug 2019 07:25  Phos  3.3     08-07  Mg     1.7     -    TPro  6.3  /  Alb  2.5<L>  /  TBili  0.5  /  DBili  x   /  AST  17  /  ALT  13  /  AlkPhos  102  08-07    Urinalysis Basic - ( 07 Aug 2019 16:07 )    Color: Yellow / Appearance: Clear / S.015 / pH: x  Gluc: x / Ketone: Negative  / Bili: Negative / Urobili: Negative   Blood: x / Protein: Negative / Nitrite: Negative   Leuk Esterase: Trace / RBC: >50 /HPF / WBC 3-5 /HPF   Sq Epi: x / Non Sq Epi: Few /HPF / Bacteria: Moderate /HPF        RADIOLOGY & ADDITIONAL STUDIES:    ADVANCE DIRECTIVES:

## 2019-08-08 NOTE — PROGRESS NOTE ADULT - PROBLEM SELECTOR PLAN 4
Patient has a history of ppm.   Cardio consulted- Dr. Zhu recommended ppm check  -[employer] pacemaker, interrogated, no events noted. 7 years left on pacemaker life.  tele discontinued however on ECG, possible afib. Baseline ECG poor. Patient's ppm interrogation does not show afib.
Patient has a history of ppm.   Cardio consulted- Dr. Zhu recommended ppm check  -[employer] pacemaker, interrogated, no events noted. 7 years left on pacemaker life.

## 2019-08-08 NOTE — PROGRESS NOTE ADULT - PROBLEM SELECTOR PLAN 8
IMPROVE VTE score:  [ ] Previous VTE                                                3  [ ] Thrombophilia                                             2  [ ] Lower limb paralysis                                  2        (unable to hold up >15 seconds)    [ ] Current Cancer (within 6 months)            2   [x] Immobilization > 24 hrs                              1  [ ] ICU/CCU stay > 24 hours                            1  [x] Age > 60                                                         1  IMPROVE Score- 2  Heparin SC
IMPROVE VTE score:  [ ] Previous VTE                                                3  [ ] Thrombophilia                                             2  [ ] Lower limb paralysis                                  2        (unable to hold up >15 seconds)    [ ] Current Cancer (within 6 months)            2   [x] Immobilization > 24 hrs                              1  [ ] ICU/CCU stay > 24 hours                            1  [x] Age > 60                                                         1  IMPROVE Score- 2  Heparin SC

## 2019-08-08 NOTE — PROGRESS NOTE ADULT - PROBLEM SELECTOR PLAN 7
-Goals of care were discussed with patient's wife using , she would want every necessary medical intervention to be done for him at this time. She is not ready to make any final decisions. Patient remains FULL CODE for now.   -Palliative care team to follow, Dr. Pierce on board, son to discuss with patient's wife this PM

## 2019-08-08 NOTE — PROGRESS NOTE ADULT - PROBLEM SELECTOR PLAN 1
Patient presenting with poor PO intake, he is cachectic  worsening dementia vs depression vs underlying infectious/metabolic  causes   -TSH elevated, start levothyroxine, patient used to on it but not seen in current medications brought by wife. T3 decreased 59  -Follow B12- wnl(467), RPR   -UA negative for UTI  -Speech and swallow evaluated, recommends: mechanical soft, thin liquids and supervised feedings. Patient able to eat and swallow, however, seems to forget that he has food in his mouth  -Palliative care, Dr. Pierce consulted, son to discuss with patient's spouse this PM

## 2019-08-08 NOTE — CONSULT NOTE ADULT - PROBLEM SELECTOR RECOMMENDATION 2
2/2 advanced dementia, diet as tolerated, SLP eval noted. nutrition eval. Has skin failure, continue local wound care

## 2019-08-08 NOTE — PROGRESS NOTE ADULT - PROBLEM SELECTOR PLAN 2
-Patient c/o crusting of eyes, no burning or changes in vision.   -On ciprofloxacin drops
-Patient c/o crusting of eyes, no burning or changes in vision.   -On ciprofloxacin drops

## 2019-08-09 ENCOUNTER — TRANSCRIPTION ENCOUNTER (OUTPATIENT)
Age: 84
End: 2019-08-09

## 2019-08-09 VITALS
SYSTOLIC BLOOD PRESSURE: 134 MMHG | DIASTOLIC BLOOD PRESSURE: 59 MMHG | RESPIRATION RATE: 18 BRPM | TEMPERATURE: 98 F | HEART RATE: 91 BPM | OXYGEN SATURATION: 98 %

## 2019-08-09 LAB
ANION GAP SERPL CALC-SCNC: 8 MMOL/L — SIGNIFICANT CHANGE UP (ref 5–17)
BUN SERPL-MCNC: 21 MG/DL — HIGH (ref 7–18)
CALCIUM SERPL-MCNC: 9.1 MG/DL — SIGNIFICANT CHANGE UP (ref 8.4–10.5)
CHLORIDE SERPL-SCNC: 109 MMOL/L — HIGH (ref 96–108)
CO2 SERPL-SCNC: 25 MMOL/L — SIGNIFICANT CHANGE UP (ref 22–31)
CREAT SERPL-MCNC: 0.73 MG/DL — SIGNIFICANT CHANGE UP (ref 0.5–1.3)
GLUCOSE BLDC GLUCOMTR-MCNC: 90 MG/DL — SIGNIFICANT CHANGE UP (ref 70–99)
GLUCOSE SERPL-MCNC: 87 MG/DL — SIGNIFICANT CHANGE UP (ref 70–99)
HCT VFR BLD CALC: 34 % — LOW (ref 39–50)
HGB BLD-MCNC: 11.2 G/DL — LOW (ref 13–17)
MCHC RBC-ENTMCNC: 30.9 PG — SIGNIFICANT CHANGE UP (ref 27–34)
MCHC RBC-ENTMCNC: 32.9 GM/DL — SIGNIFICANT CHANGE UP (ref 32–36)
MCV RBC AUTO: 93.7 FL — SIGNIFICANT CHANGE UP (ref 80–100)
NRBC # BLD: 0 /100 WBCS — SIGNIFICANT CHANGE UP (ref 0–0)
PLATELET # BLD AUTO: 230 K/UL — SIGNIFICANT CHANGE UP (ref 150–400)
POTASSIUM SERPL-MCNC: 3.6 MMOL/L — SIGNIFICANT CHANGE UP (ref 3.5–5.3)
POTASSIUM SERPL-SCNC: 3.6 MMOL/L — SIGNIFICANT CHANGE UP (ref 3.5–5.3)
RBC # BLD: 3.63 M/UL — LOW (ref 4.2–5.8)
RBC # FLD: 15 % — HIGH (ref 10.3–14.5)
SODIUM SERPL-SCNC: 142 MMOL/L — SIGNIFICANT CHANGE UP (ref 135–145)
WBC # BLD: 7.41 K/UL — SIGNIFICANT CHANGE UP (ref 3.8–10.5)
WBC # FLD AUTO: 7.41 K/UL — SIGNIFICANT CHANGE UP (ref 3.8–10.5)

## 2019-08-09 PROCEDURE — 80061 LIPID PANEL: CPT

## 2019-08-09 PROCEDURE — 80053 COMPREHEN METABOLIC PANEL: CPT

## 2019-08-09 PROCEDURE — 84443 ASSAY THYROID STIM HORMONE: CPT

## 2019-08-09 PROCEDURE — 85610 PROTHROMBIN TIME: CPT

## 2019-08-09 PROCEDURE — 83735 ASSAY OF MAGNESIUM: CPT

## 2019-08-09 PROCEDURE — 80048 BASIC METABOLIC PNL TOTAL CA: CPT

## 2019-08-09 PROCEDURE — 36415 COLL VENOUS BLD VENIPUNCTURE: CPT

## 2019-08-09 PROCEDURE — 83880 ASSAY OF NATRIURETIC PEPTIDE: CPT

## 2019-08-09 PROCEDURE — 85027 COMPLETE CBC AUTOMATED: CPT

## 2019-08-09 PROCEDURE — 82962 GLUCOSE BLOOD TEST: CPT

## 2019-08-09 PROCEDURE — 99285 EMERGENCY DEPT VISIT HI MDM: CPT | Mod: 25

## 2019-08-09 PROCEDURE — 99239 HOSP IP/OBS DSCHRG MGMT >30: CPT | Mod: GC

## 2019-08-09 PROCEDURE — 84100 ASSAY OF PHOSPHORUS: CPT

## 2019-08-09 PROCEDURE — 83036 HEMOGLOBIN GLYCOSYLATED A1C: CPT

## 2019-08-09 PROCEDURE — 84480 ASSAY TRIIODOTHYRONINE (T3): CPT

## 2019-08-09 PROCEDURE — 84439 ASSAY OF FREE THYROXINE: CPT

## 2019-08-09 PROCEDURE — 81001 URINALYSIS AUTO W/SCOPE: CPT

## 2019-08-09 PROCEDURE — 71045 X-RAY EXAM CHEST 1 VIEW: CPT

## 2019-08-09 PROCEDURE — 86901 BLOOD TYPING SEROLOGIC RH(D): CPT

## 2019-08-09 PROCEDURE — 82553 CREATINE MB FRACTION: CPT

## 2019-08-09 PROCEDURE — 84484 ASSAY OF TROPONIN QUANT: CPT

## 2019-08-09 PROCEDURE — 87040 BLOOD CULTURE FOR BACTERIA: CPT

## 2019-08-09 PROCEDURE — 70450 CT HEAD/BRAIN W/O DYE: CPT

## 2019-08-09 PROCEDURE — 83690 ASSAY OF LIPASE: CPT

## 2019-08-09 PROCEDURE — 86850 RBC ANTIBODY SCREEN: CPT

## 2019-08-09 PROCEDURE — 85730 THROMBOPLASTIN TIME PARTIAL: CPT

## 2019-08-09 PROCEDURE — 84436 ASSAY OF TOTAL THYROXINE: CPT

## 2019-08-09 PROCEDURE — 86900 BLOOD TYPING SEROLOGIC ABO: CPT

## 2019-08-09 PROCEDURE — 83605 ASSAY OF LACTIC ACID: CPT

## 2019-08-09 PROCEDURE — G0378: CPT

## 2019-08-09 PROCEDURE — 93005 ELECTROCARDIOGRAM TRACING: CPT

## 2019-08-09 PROCEDURE — 82607 VITAMIN B-12: CPT

## 2019-08-09 RX ORDER — LEVOTHYROXINE SODIUM 125 MCG
1 TABLET ORAL
Qty: 30 | Refills: 0
Start: 2019-08-09 | End: 2019-09-07

## 2019-08-09 RX ORDER — HYDROCORTISONE 1 %
1 OINTMENT (GRAM) TOPICAL
Qty: 1 | Refills: 0
Start: 2019-08-09 | End: 2019-08-15

## 2019-08-09 RX ORDER — CIPROFLOXACIN HCL 0.3 %
1 DROPS OPHTHALMIC (EYE)
Qty: 1 | Refills: 0
Start: 2019-08-09 | End: 2019-08-23

## 2019-08-09 RX ADMIN — Medication 25 MICROGRAM(S): at 06:01

## 2019-08-09 RX ADMIN — Medication 1 DROP(S): at 06:01

## 2019-08-09 RX ADMIN — Medication 81 MILLIGRAM(S): at 11:36

## 2019-08-09 RX ADMIN — Medication 1 DROP(S): at 14:14

## 2019-08-09 RX ADMIN — Medication 1 APPLICATION(S): at 06:00

## 2019-08-09 RX ADMIN — Medication 1 DROP(S): at 18:19

## 2019-08-09 RX ADMIN — MIDODRINE HYDROCHLORIDE 10 MILLIGRAM(S): 2.5 TABLET ORAL at 06:01

## 2019-08-09 RX ADMIN — Medication 1 DROP(S): at 02:00

## 2019-08-09 RX ADMIN — HEPARIN SODIUM 5000 UNIT(S): 5000 INJECTION INTRAVENOUS; SUBCUTANEOUS at 06:01

## 2019-08-09 RX ADMIN — FINASTERIDE 5 MILLIGRAM(S): 5 TABLET, FILM COATED ORAL at 11:36

## 2019-08-09 RX ADMIN — PANTOPRAZOLE SODIUM 40 MILLIGRAM(S): 20 TABLET, DELAYED RELEASE ORAL at 06:01

## 2019-08-09 RX ADMIN — Medication 1 DROP(S): at 11:36

## 2019-08-09 RX ADMIN — HEPARIN SODIUM 5000 UNIT(S): 5000 INJECTION INTRAVENOUS; SUBCUTANEOUS at 14:02

## 2019-08-09 RX ADMIN — MIDODRINE HYDROCHLORIDE 10 MILLIGRAM(S): 2.5 TABLET ORAL at 14:02

## 2019-08-09 NOTE — DISCHARGE NOTE PROVIDER - NSDCFUSCHEDAPPT_GEN_ALL_CORE_FT
AKIRA MARIANO ; 08/15/2019 ; NP Med [address] AKIRA Coker ; 09/03/2019 ; NPP Neuro [address] [facility]  AKIRA MARIANO ; 09/18/2019 ; NP Urology [address] [facility] AKIRA MARIANO ; 08/15/2019 ; NP Med [address] AKIRA Coker ; 09/03/2019 ; NPP Neuro [address] [facility]  AKIRA MARIANO ; 09/18/2019 ; NP Urology [address] [facility] AKIRA MARIANO ; 08/15/2019 ; NP Med [address] AKIRA Coker ; 09/03/2019 ; NPP Neuro [address] [facility]  AKIRA MARIANO ; 09/18/2019 ; NP Urology [address] [facility]

## 2019-08-09 NOTE — GOALS OF CARE CONVERSATION - PERSONAL ADVANCE DIRECTIVE - CONVERSATION DETAILS
Spoke with patient's wife and son by phone regarding current condition, trajectory of advanced dementia, goals of care. She expressed that she was sad to know his condition was so advanced but is coming to terms with it and wishes to keep him comfortable. Risks/benefits of CPR/intubation discussed in context of his advanced illness, she prefers to allow for natural death. CAIN drafted for DNR/DNI. Advised that patient is appropriate for hospice services at home, hospice philosophy discussed. She is agreeable. SW referral made.

## 2019-08-09 NOTE — PROGRESS NOTE ADULT - ASSESSMENT
89 year old male patient, from home, AAO x 1-2 at baseline,  wheelchair bound, with PMH significant for pacemaker placement,  dementia, CKD, hypotension on midodrine, depression presented to ED with c/o decreased PO intake, non healing left elbow and hand wound and chronic cough. Patient himself is a poor historian.  Patient wife reports that he developed non productive cough around February 8th and went to see his doctor who prescribed medications and was diagnosed with Bronchitis, however there is no improvement and he continues to have dry cough, she denies any fever, chills or shortness of breath or sick contacts. But he does have occasional chest pain and jumps off as if his pacemaker is "shocking him". His wounds were sustained after fall 2 weeks ago and have not healed completely   She also reported that for last few week his appetite has been extremely poor and she has to feed him. He also has b/l eye purulent discharge for last 3 weeks, often wakes up with crusted eyes and is not able to open them. But denies any pain, itching or changes in vision. She has been using OTC eye drops for pink eyes with no relief in symptoms.   Patient with quadriplegia, dehydration, hypothyroidism, dementia, calorie deficient, conjunctivitis admitted with failure to thrive
89 year old male patient, from home, AAO x 1-2 at baseline,  wheelchair bound, with PMH significant for pacemaker placement,  dementia, CKD, hypotension on midodrine, depression presented to ED with c/o decreased PO intake, non healing left elbow and hand wound and chronic cough.  1.D/C tele.  2.Hypotension-midodrine.  3.Depression-remeron.  4.D/C IVF.  5.Cont asa,statin.  6.GI and DVT prophylaxis.
89 year old male patient, from home, AAO x 1-2 at baseline,  wheelchair bound, with PMH significant for pacemaker placement,  dementia, CKD, hypotension on midodrine, depression presented to ED with c/o decreased PO intake, non healing left elbow and hand wound and chronic cough.  1.Palliative eval noted.  2.Hypotension-midodrine.  3.Depression-remeron.  4.Cont asa,statin.  5.GI and DVT prophylaxis.
89 year old male patient, from home, AAO x 1-2 at baseline,  wheelchair bound, with PMH significant for pacemaker placement,  dementia, CKD, hypotension on midodrine, depression presented to ED with c/o decreased PO intake, non healing left elbow and hand wound and chronic cough. Patient himself is a poor historian.  Patient wife reports that he developed non productive cough around February 8th and went to see his doctor who prescribed medications and was diagnosed with Bronchitis, however there is no improvement and he continues to have dry cough, she denies any fever, chills or shortness of breath or sick contacts. But he does have occasional chest pain and jumps off as if his pacemaker is "shocking him". His wounds were sustained after fall 2 weeks ago and have not healed completely   She also reported that for last few week his appetite has been extremely poor and she has to feed him. He also has b/l eye purulent discharge for last 3 weeks, often wakes up with crusted eyes and is not able to open them. But denies any pain, itching or changes in vision. She has been using OTC eye drops for pink eyes with no relief in symptoms.   Patient with quadriplegia, dehydration, hypothyroidism, dementia, calorie deficient, conjunctivitis admitted with failure to thrive

## 2019-08-09 NOTE — DISCHARGE NOTE PROVIDER - HOSPITAL COURSE
89 year old male patient, from home, AAO x 1-2 at baseline,  wheelchair bound, with PMH significant for pacemaker placement,  dementia, CKD, hypotension on midodrine, depression presented to ED with c/o decreased PO intake, non healing left elbow and hand wound and chronic cough. Patient himself is a poor historian.  Patient wife reports that he developed non productive cough around February 8th and went to see his doctor who prescribed medications and was diagnosed with Bronchitis, however there is no improvement and he continues to have dry cough, she denies any fever, chills or shortness of breath or sick contacts. But he does have occasional chest pain and jumps off as if his pacemaker is "shocking him". His wounds were sustained after fall 2 weeks ago and have not healed completely     She also reported that for last few week his appetite has been extremely poor and she has to feed him. He also has b/l eye purulent discharge for last 3 weeks, often wakes up with crusted eyes and is not able to open them. But denies any pain, itching or changes in vision. She has been using OTC eye drops for pink eyes with no relief in symptoms.     Patient admitted for failure to thrive.     Due to abnormalities on ECG as well as history of pacemaker, cardio, Dr. Zhu was consulted and recommended to interrogate PPM. Patient has a [employer] device and when interrogated did not show any abnormal events. 7 years left on PPM life. Patient was continued on his home medications, and metabolic and infectious causes of patient's conditions were ruled out. Patient remained afebrile, no leukocytosis, UA negative and CXR clear. CT head did not reveal any acute pathology.    Palliative care was consulted for patient's disposition after hospital stay. Family members were reached and agreed to home hospice. Goals of care were discussed and patient's family agreed on DNR/DNI.    Patient is stable for discharge per attending and is advised to follow up with PCP as outpatient    Please refer to patient's complete medical chart with documents for a full hospital course, for this is only a brief summary.

## 2019-08-09 NOTE — CHART NOTE - NSCHARTNOTEFT_GEN_A_CORE
Patient seen/evaluated with family friends at bedside. Patient with dementia, Bed bound, Contracted, Discussed with PGY1    Patient is able to eat lunch with assistance in feeds. No acute distress; Mumbles in Nepali. No acute distress noted    Vital Signs Last 24 Hrs  T(C): 36.9 (09 Aug 2019 15:19), Max: 37 (08 Aug 2019 19:15)  T(F): 98.5 (09 Aug 2019 15:19), Max: 98.6 (08 Aug 2019 19:15)  HR: 91 (09 Aug 2019 15:19) (70 - 91)  BP: 134/59 (09 Aug 2019 15:19) (103/50 - 145/52)  RR: 18 (09 Aug 2019 15:19) (17 - 18)  SpO2: 98% (09 Aug 2019 15:19) (96% - 98%)    P/E:  Elderly contracted male in bed   Neuro: Alert, Awake, Dementia++ advanced      89 y/o m w/  1. Failure to thrive in the elderly  2. functional quadriplegia with limb contractures  3. dehydration  4. hypothyroidism  5. dementia  6. orthostatic hypotension  7. severe protein calorie malnutrition  8. conjunctivitis  9. hand/elbow abrasions  10. right upper chest wall rash  11. decub ulcers of R elbow, sacrum: present on admission  12 h/o PPM ([employer]) s/p interrogation    Pt is awake/alert, orientation to self only though appears significantly dehydrated. Urinalysis is negative for evidence of UTI  IVF hydration  thyroid function tests  nutrition consult, dysphagia diet  nutrition supplementation  palliative care consult appreciated; service discussed with son. Per my discussion with spouse she does not appear to comprehend the progressive nature of his disease  ciprofloxacin eye drops  hydrocortisone to right upper chest and neck  feed with assistance, frequent turns, aspiration precautions.  case mgt consult, social work consult for spouse support services referral   discharge planning, prepare for discharge . Patient seen/evaluated with family friends at bedside. Patient with dementia, Bed bound, Contracted, Discussed with PGY1    Patient is able to eat lunch with assistance in feeds. No acute distress; Mumbles in Romanian. No acute distress noted    Vital Signs Last 24 Hrs  T(C): 36.9 (09 Aug 2019 15:19), Max: 37 (08 Aug 2019 19:15)  T(F): 98.5 (09 Aug 2019 15:19), Max: 98.6 (08 Aug 2019 19:15)  HR: 91 (09 Aug 2019 15:19) (70 - 91)  BP: 134/59 (09 Aug 2019 15:19) (103/50 - 145/52)  RR: 18 (09 Aug 2019 15:19) (17 - 18)  SpO2: 98% (09 Aug 2019 15:19) (96% - 98%)    P/E:  Elderly contracted male in bed   Neuro: Alert, Awake, Dementia++ advanced    Labs:  Thyroid Stimulating Hormone, Serum (08.06.19 @ 12:00)    Thyroid Stimulating Hormone, Serum: 7.90 uU/mL        88 y/o m w/  1. Failure to thrive in the elderly  2. functional quadriplegia with limb contractures  3. dehydration  4. hypothyroidism  5. dementia  6. orthostatic hypotension stable   7. severe protein calorie malnutrition  8. conjunctivitis  10. decub ulcers of R elbow, sacrum: present on admission  12 CAD s/p PPM ([employer]) s/p interrogation    Plan:  Pt is awake/alert, orientation to self only though appears significantly dehydrated. Urinalysis is negative for evidence of UTI  nutrition consult, dysphagia diet  nutrition supplementation Ensure can twice daily as tolerated  palliative care consult appreciated; Dr. Pierce discussed with son. Son spoke with Mom; Family agreed for Home Hospice care  Discussed with Dr. Pierce and Palliative NP  Discussed with  Precious; Patient does not need additional equipments at this time  Patient accepted for Home Hospice with Discharge this evening   feed with assistance, frequent turns, aspiration precautions.    Patient is medically stable for Transfer to Home with hospice care  DNR status  Prognosis is guarded.     See discharge note updated for details

## 2019-08-09 NOTE — DISCHARGE NOTE NURSING/CASE MANAGEMENT/SOCIAL WORK - NSDCDPATPORTLINK_GEN_ALL_CORE
You can access the [facility] Patient Portal, offered by [facility], by registering with the following website: [email or web address]

## 2019-08-09 NOTE — CHART NOTE - NSCHARTNOTEFT_GEN_A_CORE
Palliative followup    Spoke with patient's wife and son by phone regarding current condition, trajectory of advanced dementia, goals of care. She expressed that she was sad to know his condition was so advanced but is coming to terms with it and wishes to keep him comfortable. Risks/benefits of CPR/intubation discussed in context of his advanced illness, she prefers to allow for natural death. CAIN drafted for DNR/DNI. Advised that patient is appropriate for hospice services at home, hospice philosophy discussed. She is agreeable. SW referral made.

## 2019-08-09 NOTE — PROGRESS NOTE ADULT - REASON FOR ADMISSION
decreased PO intake, left elbow wounds

## 2019-08-09 NOTE — DISCHARGE NOTE PROVIDER - NSDCCPCAREPLAN_GEN_ALL_CORE_FT
PRINCIPAL DISCHARGE DIAGNOSIS  Diagnosis: Failure to thrive in adult  Assessment and Plan of Treatment: You presented with decreased appetite, and changing mental status. This is likely due to worsening dementia. Palliative care specialists were brought on board and had a goals of care discussion. You are to go home with home hospice to continue with comfort measures. Please follow up with your primary care doctor with any nonemergent concerns you may have.      SECONDARY DISCHARGE DIAGNOSES  Diagnosis: AICD (automatic cardioverter/defibrillator) present  Assessment and Plan of Treatment: You presented with an AICD device already implanted. It is a [employer] device. Your device was interrogated and it did not show any abnormalities. Your device has 7 years remaining. Please follow up with your cardiologist and inform them of your recent hospitalization.    Diagnosis: Severe protein-calorie malnutrition  Assessment and Plan of Treatment: Severe protein-calorie malnutrition    Diagnosis: Hypotension  Assessment and Plan of Treatment: You have a history of decreased blood pressures. Currently, your blood pressures are well maintained on midodrine. Please continue with midodrine for your blood pressure. Please follow up with your primary care physician when appropriate to inform them of your recent hospitalization.    Diagnosis: Hypothyroid  Assessment and Plan of Treatment: Continue with your synthroid medication. Get constant follow up with TSH levels with you primary care physician for medication adjustement if needed.    Diagnosis: Palliative care encounter  Assessment and Plan of Treatment: You were admitted for failure to thrive. Palliative care specialists were brought on board, and after much discussion with your family members, the decision was made for you to go home with home hospice. Please follow up with your primary care doctor when appropriate.    Diagnosis: Acute bacterial conjunctivitis of both eyes  Assessment and Plan of Treatment: You presented with complaint of discharge from your eyes. We treated you with ciprofloxacin and gentamicin eye drops. Your conjunctivitis continued to improve. We recommend you continue with these medications when needed. Please follow up with your primary care physician in one week to inform them of your recent hospitalization. PRINCIPAL DISCHARGE DIAGNOSIS  Diagnosis: Failure to thrive in adult  Assessment and Plan of Treatment: You presented with decreased appetite, and changing mental status. This is likely due to worsening dementia. Palliative care specialists were brought on board and had a goals of care discussion. You are to go home with home hospice to continue with comfort measures. Please follow up with your primary care doctor with any nonemergent concerns you may have.      SECONDARY DISCHARGE DIAGNOSES  Diagnosis: AICD (automatic cardioverter/defibrillator) present  Assessment and Plan of Treatment: You presented with an AICD device already implanted. It is a [employer] device. Your device was interrogated and it did not show any abnormalities. Your device has 7 years remaining. Please follow up with your cardiologist and inform them of your recent hospitalization.    Diagnosis: Hypotension  Assessment and Plan of Treatment: You have a history of decreased blood pressures. Currently, your blood pressures are well maintained on midodrine. Please continue with midodrine for your blood pressure. Please follow up with your primary care physician when appropriate to inform them of your recent hospitalization.    Diagnosis: Hypothyroid  Assessment and Plan of Treatment: Continue with your synthroid medication. Get constant follow up with TSH levels with you primary care physician for medication adjustement if needed.    Diagnosis: Severe protein-calorie malnutrition  Assessment and Plan of Treatment: You presented with severe protein-calorie malnutrition. A nutritionist evaluated you and recommended a pureed diet that is nectar consistency. It is also recommended that you supplement with Ensure Pudding 120mL. We recommend that your family helps encourage your oral intake. Please follow up with your primary care physician in one week to inform them of your recent hospitalization.    Diagnosis: Palliative care encounter  Assessment and Plan of Treatment: You were admitted for failure to thrive. Palliative care specialists were brought on board, and after much discussion with your family members, the decision was made for you to go home with home hospice. Please follow up with your primary care doctor when appropriate.    Diagnosis: Acute bacterial conjunctivitis of both eyes  Assessment and Plan of Treatment: You presented with complaint of discharge from your eyes. We treated you with ciprofloxacin eye drops. Your conjunctivitis continued to improve. We recommend you continue with these medications when needed. Please follow up with your primary care physician in one week to inform them of your recent hospitalization. PRINCIPAL DISCHARGE DIAGNOSIS  Diagnosis: Failure to thrive in adult  Assessment and Plan of Treatment: You presented with decreased appetite, and changing mental status. This is likely due to worsening dementia. Palliative care specialists were brought on board and had a goals of care discussion. You are to go home with home hospice to continue with comfort measures. Please follow up with your primary care doctor with any nonemergent concerns you may have.      SECONDARY DISCHARGE DIAGNOSES  Diagnosis: Severe protein-calorie malnutrition  Assessment and Plan of Treatment: You presented with severe protein-calorie malnutrition. A nutritionist evaluated you and recommended a pureed diet that is nectar consistency. It is also recommended that you supplement with Ensure Pudding 120mL two to three times a day as tolerated. We recommend that your family helps encourage your oral intake. Please follow up with your primary care physician in one week to inform them of your recent hospitalization.    Diagnosis: AICD (automatic cardioverter/defibrillator) present  Assessment and Plan of Treatment: You presented with an AICD device already implanted. It is a [employer] device. Your device was interrogated and it did not show any abnormalities. Your device has 7 years remaining. Please follow up with your cardiologist and inform them of your recent hospitalization.    Diagnosis: Hypothyroid  Assessment and Plan of Treatment: Continue with your synthroid medication. Get constant follow up with TSH levels with you primary care physician for medication adjustement if needed.    Diagnosis: Hypotension  Assessment and Plan of Treatment: You have a history of decreased blood pressures. Currently, your blood pressures are well maintained on midodrine. Please continue with midodrine for your blood pressure. Please follow up with your primary care physician when appropriate to inform them of your recent hospitalization.    Diagnosis: Palliative care encounter  Assessment and Plan of Treatment: You were admitted for failure to thrive. Palliative care specialists were brought on board, and after much discussion with your family members, the decision was made for you to go home with home hospice. Please follow up with your primary care doctor when appropriate.    Diagnosis: Acute bacterial conjunctivitis of both eyes  Assessment and Plan of Treatment: You presented with complaint of discharge from your eyes. We treated you with ciprofloxacin eye drops. Your conjunctivitis continued to improve. We recommend you continue with these medications when needed. Please follow up with your primary care physician in one week to inform them of your recent hospitalization.

## 2019-08-09 NOTE — PROGRESS NOTE ADULT - SUBJECTIVE AND OBJECTIVE BOX
CHIEF COMPLAINT:Patient is a 89y old  Male who presents with a chief complaint of decreased PO intake, left elbow wounds .Pt appears comfortable.    	  REVIEW OF SYSTEMS:  unable to obtain    PHYSICAL EXAM:  T(C): 37.1 (08-08-19 @ 07:08), Max: 37.2 (08-07-19 @ 23:47)  HR: 76 (08-08-19 @ 07:08) (69 - 83)  BP: 131/57 (08-08-19 @ 07:08) (108/69 - 134/62)  RR: 18 (08-08-19 @ 07:08) (17 - 18)  SpO2: 98% (08-08-19 @ 07:08) (95% - 99%)        Appearance: Normal	  HEENT:   Normal oral mucosa, PERRL, EOMI	  Lymphatic: No lymphadenopathy  Cardiovascular: Normal S1 S2, No JVD, No murmurs, No edema  Respiratory: Lungs clear to auscultation	  Gastrointestinal:  Soft, Non-tender, + BS	  Skin: No rashes, No ecchymoses, No cyanosis	  Extremities: Normal range of motion, No clubbing, cyanosis or edema  Vascular: Peripheral pulses palpable 2+ bilaterally    MEDICATIONS  (STANDING):  aspirin  chewable 81 milliGRAM(s) Oral daily  atorvastatin 10 milliGRAM(s) Oral at bedtime  ciprofloxacin  0.3% Ophthalmic Solution 1 Drop(s) Both EYES every 4 hours  finasteride 5 milliGRAM(s) Oral daily  heparin  Injectable 5000 Unit(s) SubCutaneous every 8 hours  hydrocortisone 1% Cream 1 Application(s) Topical two times a day  levothyroxine 25 MICROGram(s) Oral daily  midodrine 10 milliGRAM(s) Oral three times a day  mirtazapine 15 milliGRAM(s) Oral at bedtime  pantoprazole    Tablet 40 milliGRAM(s) Oral before breakfast      TELEMETRY: nsr with intermittent	  a paced    	  LABS:	 	      CARDIAC MARKERS ( 06 Aug 2019 12:00 )  <0.015 ng/mL / x     / x     / x     / <1.0 ng/mL                        11.0   6.26  )-----------( 226      ( 08 Aug 2019 07:25 )             34.1     08-08    143  |  111<H>  |  23<H>  ----------------------------<  76  3.5   |  25  |  0.76    Ca    9.1      08 Aug 2019 07:25  Phos  3.3     08-07  Mg     1.7     08-07    TPro  6.3  /  Alb  2.5<L>  /  TBili  0.5  /  DBili  x   /  AST  17  /  ALT  13  /  AlkPhos  102  08-07    proBNP: Serum Pro-Brain Natriuretic Peptide: 195 pg/mL (08-06 @ 12:00)    Lipid Profile: Cholesterol 122  LDL 65  HDL 45  TG 62    HgA1c: Hemoglobin A1C, Whole Blood: 5.8 % (08-07 @ 09:28)    TSH: Thyroid Stimulating Hormone, Serum: 7.90 uU/mL (08-06 @ 12:00)      	    PPM-nl fx, battery 7 yrs.
CHIEF COMPLAINT:Patient is a 89y old  Male who presents with a chief complaint of decreased PO intake, left elbow wounds. Pt appears comfortable.    	  REVIEW OF SYSTEMS:  [x ] Unable to obtain    PHYSICAL EXAM:  T(C): 36.9 (08-09-19 @ 07:29), Max: 37.1 (08-08-19 @ 11:16)  HR: 71 (08-09-19 @ 07:29) (70 - 90)  BP: 115/50 (08-09-19 @ 07:29) (103/50 - 172/68)  RR: 18 (08-09-19 @ 07:29) (17 - 18)  SpO2: 98% (08-09-19 @ 07:29) (97% - 98%)  Wt(kg): --  I&O's Summary    08 Aug 2019 07:01  -  09 Aug 2019 07:00  --------------------------------------------------------  IN: 280 mL / OUT: 0 mL / NET: 280 mL    09 Aug 2019 07:01  -  09 Aug 2019 09:26  --------------------------------------------------------  IN: 59 mL / OUT: 0 mL / NET: 59 mL        Appearance: Normal	  HEENT:   Normal oral mucosa, PERRL, EOMI	  Lymphatic: No lymphadenopathy  Cardiovascular: Normal S1 S2, No JVD, No murmurs, No edema  Respiratory: Lungs clear to auscultation	  Gastrointestinal:  Soft, Non-tender, + BS	  Skin: No rashes, No ecchymoses, No cyanosis	  Extremities: Normal range of motion, No clubbing, cyanosis or edema      MEDICATIONS  (STANDING):  aspirin  chewable 81 milliGRAM(s) Oral daily  atorvastatin 10 milliGRAM(s) Oral at bedtime  ciprofloxacin  0.3% Ophthalmic Solution 1 Drop(s) Both EYES every 4 hours  finasteride 5 milliGRAM(s) Oral daily  heparin  Injectable 5000 Unit(s) SubCutaneous every 8 hours  hydrocortisone 1% Cream 1 Application(s) Topical two times a day  levothyroxine 25 MICROGram(s) Oral daily  midodrine 10 milliGRAM(s) Oral three times a day  mirtazapine 15 milliGRAM(s) Oral at bedtime  pantoprazole    Tablet 40 milliGRAM(s) Oral before breakfast      LABS:	 	                        11.2   7.41  )-----------( 230      ( 09 Aug 2019 08:46 )             34.0     08-09    142  |  109<H>  |  21<H>  ----------------------------<  87  3.6   |  25  |  0.73    Ca    9.1      09 Aug 2019 08:46      proBNP: Serum Pro-Brain Natriuretic Peptide: 195 pg/mL (08-06 @ 12:00)    Lipid Profile: Cholesterol 122  LDL 65  HDL 45  TG 62    HgA1c: Hemoglobin A1C, Whole Blood: 5.8 % (08-07 @ 09:28)    TSH: Thyroid Stimulating Hormone, Serum: 7.90 uU/mL (08-06 @ 12:00)
PGY 1 Note discussed with supervising resident and primary attending    Patient is a 89y old  Male who presents with a chief complaint of decreased PO intake, left elbow wounds (08 Aug 2019 08:17)      INTERVAL HPI/OVERNIGHT EVENTS: Patient observed lying in bed, mumbling to himself. Patient unable to give ROS, no family by bedside. Patient appears more awake than previous exams, is awake, alert, and oriented to person.    MEDICATIONS  (STANDING):  aspirin  chewable 81 milliGRAM(s) Oral daily  atorvastatin 10 milliGRAM(s) Oral at bedtime  ciprofloxacin  0.3% Ophthalmic Solution 1 Drop(s) Both EYES every 4 hours  finasteride 5 milliGRAM(s) Oral daily  heparin  Injectable 5000 Unit(s) SubCutaneous every 8 hours  hydrocortisone 1% Cream 1 Application(s) Topical two times a day  levothyroxine 25 MICROGram(s) Oral daily  midodrine 10 milliGRAM(s) Oral three times a day  mirtazapine 15 milliGRAM(s) Oral at bedtime  pantoprazole    Tablet 40 milliGRAM(s) Oral before breakfast    MEDICATIONS  (PRN):      __________________________________________________  REVIEW OF SYSTEMS:    Unable to obtain      Vital Signs Last 24 Hrs  T(C): 37.1 (08 Aug 2019 11:16), Max: 37.2 (07 Aug 2019 23:47)  T(F): 98.7 (08 Aug 2019 11:16), Max: 99 (07 Aug 2019 23:47)  HR: 79 (08 Aug 2019 11:16) (69 - 83)  BP: 131/69 (08 Aug 2019 11:16) (108/69 - 134/62)  BP(mean): --  RR: 18 (08 Aug 2019 11:16) (17 - 18)  SpO2: 98% (08 Aug 2019 11:16) (95% - 99%)    ________________________________________________  PHYSICAL EXAM:  GENERAL: NAD, elderly, frail. Appears confused, mumbling to self, tugging at gown.   HEENT: Normocephalic;  conjunctivae and sclerae clear; moist mucous membranes; discharge noted around eyes  NECK : supple  CHEST/LUNG: Clear to auscultation bilaterally, patient does not inspire deeply when instructed to  HEART: S1 S2  regular; no murmurs, gallops or rubs  ABDOMEN: Soft, Nontender, Nondistended; Bowel sounds present  EXTREMITIES: no cyanosis; no edema; no calf tenderness  SKIN: warm and dry; no rash, Ulcer on right elbow, nonhealing abrasion on dorsal left hand.   NERVOUS SYSTEM:  Awake and alert; Oriented  to person only. Confused, does not follow commands    _________________________________________________  LABS:                        11.0   6.26  )-----------( 226      ( 08 Aug 2019 07:25 )             34.1     08-    143  |  111<H>  |  23<H>  ----------------------------<  76  3.5   |  25  |  0.76    Ca    9.1      08 Aug 2019 07:25  Phos  3.3     08-  Mg     1.7     -    TPro  6.3  /  Alb  2.5<L>  /  TBili  0.5  /  DBili  x   /  AST  17  /  ALT  13  /  AlkPhos  102  08-07    PT/INR - ( 07 Aug 2019 07:22 )   PT: 12.1 sec;   INR: 1.09 ratio         PTT - ( 06 Aug 2019 13:51 )  PTT:27.2 sec  Urinalysis Basic - ( 07 Aug 2019 16:07 )    Color: Yellow / Appearance: Clear / S.015 / pH: x  Gluc: x / Ketone: Negative  / Bili: Negative / Urobili: Negative   Blood: x / Protein: Negative / Nitrite: Negative   Leuk Esterase: Trace / RBC: >50 /HPF / WBC 3-5 /HPF   Sq Epi: x / Non Sq Epi: Few /HPF / Bacteria: Moderate /HPF      CAPILLARY BLOOD GLUCOSE      POCT Blood Glucose.: 88 mg/dL (07 Aug 2019 21:31)        RADIOLOGY & ADDITIONAL TESTS:    Imaging Personally Reviewed:  YES    Consultant(s) Notes Reviewed:   YES    Care Discussed with Consultants :     Plan of care was discussed with patient and /or primary care giver; all questions and concerns were addressed and care was aligned with patient's wishes.
PGY 1 Note discussed with supervising resident and primary attending    Patient is a 89y old  Male who presents with a chief complaint of decreased PO intake, left elbow wounds (07 Aug 2019 10:15)      INTERVAL HPI/OVERNIGHT EVENTS: Patient observed by bedside, appears lethargic, drowsy. Hard to wake.  Unable to take ROS as patient mumbles. In the PM patient, much more awake, however patient still appears confused. Continues to repeat the same phrase over and over again. As per wife's note in chart, she is worried about patient's lethargy- he sleeps all day, constant urination, decreased appetite, purulent discharge in the morning from b/l eyes, a big cut on the hand that doesnt heal, gets shocked sometimes and jumps up, unable to understand patient's speech, weakness, forgetfulness.    MEDICATIONS  (STANDING):  aspirin  chewable 81 milliGRAM(s) Oral daily  atorvastatin 10 milliGRAM(s) Oral at bedtime  ciprofloxacin  0.3% Ophthalmic Solution 1 Drop(s) Both EYES every 4 hours  finasteride 5 milliGRAM(s) Oral daily  heparin  Injectable 5000 Unit(s) SubCutaneous every 8 hours  levothyroxine 25 MICROGram(s) Oral daily  midodrine 10 milliGRAM(s) Oral three times a day  mirtazapine 15 milliGRAM(s) Oral at bedtime  sodium chloride 0.9%. 1000 milliLiter(s) (60 mL/Hr) IV Continuous <Continuous>  sodium chloride 0.9%. 1000 milliLiter(s) (60 mL/Hr) IV Continuous <Continuous>    MEDICATIONS  (PRN):      __________________________________________________  REVIEW OF SYSTEMS:    Unable to assess  ALL OTHER  ROS negative        Vital Signs Last 24 Hrs  T(C): 36.3 (07 Aug 2019 11:23), Max: 36.9 (06 Aug 2019 23:37)  T(F): 97.3 (07 Aug 2019 11:23), Max: 98.5 (06 Aug 2019 23:37)  HR: 76 (07 Aug 2019 11:23) (58 - 99)  BP: 111/37 (07 Aug 2019 11:23) (105/60 - 147/66)  BP(mean): --  RR: 18 (07 Aug 2019 11:23) (16 - 18)  SpO2: 96% (07 Aug 2019 11:23) (96% - 100%)    ________________________________________________  PHYSICAL EXAM:  GENERAL: NAD, appears frail, cachetic  HEENT: Normocephalic;  conjunctivae and sclerae clear; moist mucous membranes; Appears to have difficulty opening eyes, with white discharge noted around eye  NECK : supple  CHEST/LUNG: Clear to auscultation bilaterally with good air entry   HEART: S1 S2  regular; no murmurs, gallops or rubs  ABDOMEN: Soft, Nontender, Nondistended; Bowel sounds present  EXTREMITIES: no cyanosis; no edema; no calf tenderness  SKIN: warm and dry; no rash, poor skin tugor  NERVOUS SYSTEM:  Awake, oriented to person only    _________________________________________________  LABS:                        11.0   6.10  )-----------( 226      ( 07 Aug 2019 07:22 )             34.4     08-07    141  |  109<H>  |  25<H>  ----------------------------<  72  4.3   |  26  |  0.91    Ca    9.2      07 Aug 2019 07:22  Phos  3.3     08-07  Mg     1.7     08-07    TPro  6.3  /  Alb  2.5<L>  /  TBili  0.5  /  DBili  x   /  AST  17  /  ALT  13  /  AlkPhos  102  08-07    PT/INR - ( 07 Aug 2019 07:22 )   PT: 12.1 sec;   INR: 1.09 ratio         PTT - ( 06 Aug 2019 13:51 )  PTT:27.2 sec    CAPILLARY BLOOD GLUCOSE            RADIOLOGY & ADDITIONAL TESTS:    Imaging Personally Reviewed:  YES    Consultant(s) Notes Reviewed:   YES    Care Discussed with Consultants :     Plan of care was discussed with patient and /or primary care giver; all questions and concerns were addressed and care was aligned with patient's wishes.

## 2019-08-11 LAB
CULTURE RESULTS: SIGNIFICANT CHANGE UP
CULTURE RESULTS: SIGNIFICANT CHANGE UP
SPECIMEN SOURCE: SIGNIFICANT CHANGE UP
SPECIMEN SOURCE: SIGNIFICANT CHANGE UP

## 2019-08-12 ENCOUNTER — OTHER (OUTPATIENT)
Age: 84
End: 2019-08-12

## 2019-08-15 ENCOUNTER — APPOINTMENT (OUTPATIENT)
Dept: INTERNAL MEDICINE | Facility: CLINIC | Age: 84
End: 2019-08-15

## 2019-09-03 ENCOUNTER — APPOINTMENT (OUTPATIENT)
Dept: NEUROLOGY | Facility: CLINIC | Age: 84
End: 2019-09-03

## 2019-09-18 ENCOUNTER — APPOINTMENT (OUTPATIENT)
Dept: UROLOGY | Facility: CLINIC | Age: 84
End: 2019-09-18

## 2019-10-18 RX ORDER — MIDODRINE HYDROCHLORIDE 10 MG/1
10 TABLET ORAL 3 TIMES DAILY
Qty: 90 | Refills: 2 | Status: ACTIVE | COMMUNITY
Start: 2018-02-22 | End: 1900-01-01

## 2019-12-02 ENCOUNTER — APPOINTMENT (OUTPATIENT)
Dept: CARDIOLOGY | Facility: CLINIC | Age: 84
End: 2019-12-02

## 2020-10-02 NOTE — GOALS OF CARE CONVERSATION - PERSONAL ADVANCE DIRECTIVE - NSPROPTRIGHTCAREGIVER_GEN_A_NUR
Pt called into office in need of help with transport for up coming apt. .   Rn and pt reviewed additional transport options and assistance. It is determined he will use [facility] as last report for help. Aware of policy and  time. [facility] notified. All appts are related to medical treatment and are necessary for compliance      Appt: 11.5.2020 at 9;15 a at [facility].     Denies any needs at this time. Denies any issues that need Doctor attention.  Will call with concerns yes

## 2020-11-23 NOTE — H&P ADULT - CARDIOVASCULAR
details… detailed exam Cheek To Nose Interpolation Flap Division And Inset Text: Division and inset of the cheek to nose interpolation flap was performed to achieve optimal aesthetic result, restore normal anatomic appearance and avoid distortion of normal anatomy, expedite and facilitate wound healing, achieve optimal functional result and because linear closure either not possible or would produce suboptimal result. The patient was prepped and draped in the usual manner. The pedicle was infiltrated with local anesthesia. The pedicle was sectioned with a #15 blade. The pedicle was de-bulked and trimmed to match the shape of the defect. Hemostasis was achieved. The flap donor site and free margin of the flap were secured with deep buried sutures and the wound edges were re-approximated.
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
## 2022-05-31 NOTE — ED ADULT NURSE NOTE - PAIN: PRESENCE, MLM
complains of pain/discomfort Isotretinoin Pregnancy And Lactation Text: This medication is Pregnancy Category X and is considered extremely dangerous during pregnancy. It is unknown if it is excreted in breast milk.

## 2022-06-27 NOTE — PATIENT PROFILE ADULT - NSPROMEDSPATCH_GEN_A_NUR
[facility]) Physicians   Internal Medicine     2022  Anabella Dewitt : 1939 Sex: male  Age:82 y.o. Chief Complaint   Patient presents with    Follow-up    Cholesterol Problem    Diabetes    Hypertension    Thyroid Problem    Gastroesophageal Reflux        HPI:   Patient presents to office for evaluation of the following medical concerns. - States had cataract surgery since last visit. - States had right sided abdo wall abscess. Consultations to surgery and ID. Culture was positive actinomyces. Was started on antibiotics, zosyn. Outpatient invanz. Switched to penicillin. States drains were out. States new swelling to low low back. States has some drainage. Was admitted to hosp () - new in the lumbar area on the right side also. Had MRI peripherally enhancing collection in  the superficial soft tissues of the lower back, consistent with reported  history of abscess. States was seen by specialists at [facility]. Had surg [facility] () - s/p incision & drainage of a right flank abscess and diagnostic laparoscopy w appendectomy for actinomycosis on 22. States had a penrose drain placed. Wound grew gram positive cocci, neg culture. Was on rocephin and doxycyline. ct scan ()-new collection rt abdominal wall possible extension upward Last visit with ID  per reviewed note (2022) off doxy and augmentin, now just amoxicillin  Since still draining. Has seen surgery (2022) -will most likely need intermittent Incision and drainages, not currently. States still with drainage.     - labs still show kidney dysfunction. Last lab (2022) - slight worsening. US (2019) - normal. Stopped losartan. Referral to nephrology pending.     - Still having issues with staying asleep. Improved and Stable. Some nocturia contributing. has seen urology.  Last visit per reviewed note () - Dr Rona Alarcon states PSA is normal for an 82yr old, not concerned, will not be biopsy.     - Has skin lesion to left shoulder. Following with dermatology. Left Shoulder skin cancer. States had lesions to right hand and scalp (5/2020). 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 derm (5/22)- shave bx rt jaw, path basel cell skin cancer.     - States had lung surgery - lobectomy and lymph node biopsy - scar tissue, no cancer, states some pain in the incision - States released from CT surg.    - States has high blood pressure. States not checking blood pressure at home. States some dizziness with standing. Discussed orthostatic precautions. Stopped losartan. Now on amlodipine.     - States has high cholesterol. States watches diet. On lovastatin. No reported side effects. Last lab (4/2022)     - states has hypothyroid. On levothyroxine. Last lab (6/2022) improvedm    - States concerned about memory. States has been having issues remembering names and dates - stable. - States still having episodes of dizziness. Declines referral to neuro. Has seen cardio, work up was ok. Possible orthostasis. - labs from 6/23/2022 reviewed with patient 6/27/2022     Health Maintenance   - immunizations:   Influenza Vaccination - (11/5/2018), (2019), (2020), (2021)   Pneumonia Vaccination - (2014) prevnar, (9/2015) - pneumooccal  Shingrix  Vaccine (2019) x 2   Tetanus Vaccination  covid (3/31/2021) #1, (4/21/2021) #2, (11/5/2021) #3 - pfizer     - Screenings:   colonoscopy (4/21) -pandiverticulosis f/u in 7-10 years  Prostate     Couseled on Home Safety - (6/5/2017)    Mini Mental Status - (1/7/2019)    Dilated Eye Exam - (4/2016) - glaucoma, cataract     EGD (4/21) -small hiatal hernia w gastritis    ROS:  Review of Systems   Constitutional: Negative for appetite change, chills, fever and unexpected weight change. HENT: Negative for congestion, rhinorrhea and sore throat. Eyes: Negative for pain and visual disturbance.    Respiratory: Negative for cough, chest tightness and shortness of breath. Cardiovascular: Negative for chest pain and palpitations. Gastrointestinal: Positive for abdominal pain. Negative for blood in stool, constipation, diarrhea, nausea and vomiting. Genitourinary: Negative for difficulty urinating, dysuria, hematuria, scrotal swelling, testicular pain and urgency. Musculoskeletal: Negative for arthralgias and gait problem. Skin: Negative for rash. Neurological: Negative for dizziness, syncope, weakness, light-headedness and headaches. Hematological: Negative for adenopathy. Does not bruise/bleed easily. Psychiatric/Behavioral: Negative for suicidal ideas. The patient is not nervous/anxious.           Current Outpatient Medications:     Amoxicillin 500 MG TABS, Take 500 mg by mouth 3 times daily, Disp: , Rfl:     folic acid (FOLVITE) 1 MG tablet, Take 1 tablet by mouth daily, Disp: 90 tablet, Rfl: 1    lovastatin (MEVACOR) 20 MG tablet, Take 1 tablet by mouth every morning, Disp: 90 tablet, Rfl: 1    levothyroxine (SYNTHROID) 50 MCG tablet, Take 1 tablet by mouth daily, Disp: 90 tablet, Rfl: 1    omeprazole (PRILOSEC) 20 MG delayed release capsule, Take 1 capsule by mouth daily, Disp: 90 capsule, Rfl: 1    amLODIPine (NORVASC) 5 MG tablet, Take 1 tablet by mouth daily, Disp: 90 tablet, Rfl: 1    Specialty Vitamins Products (PROSTATE PO), Take 1 tablet by mouth 2 times daily Super Beta Prostates Advanced, Disp: , Rfl:     ferrous sulfate (IRON 325) 325 (65 Fe) MG tablet, Take 325 mg by mouth Daily with supper , Disp: , Rfl:     docusate sodium (COLACE) 100 MG capsule, Take 200 mg by mouth Daily with supper , Disp: , Rfl:     Multiple Vitamin (MULTI-VITAMIN DAILY PO), Take 1 tablet by mouth daily , Disp: , Rfl:     vitamin B-12 (CYANOCOBALAMIN) 1000 MCG tablet, Take 1,000 mcg by mouth daily, Disp: , Rfl:     calcium carbonate (OSCAL) 500 MG TABS tablet, Take 500 mg by mouth daily, Disp: , Rfl:     Cholecalciferol (VITAMIN D) 2000 units CAPS capsule, Take 2,000 Units by mouth daily , Disp: , Rfl:     Allergies   Allergen Reactions    Adhesive Tape Other (See Comments)     Blister      Doxycycline Itching and Rash       Past Medical History:   Diagnosis Date    Acid reflux     Arthritis     Colon polyps     Hyperlipidemia     Hypertension     controlled with meds    Iron deficiency anemia     2014 HMG=13.2    Liver spot     Mass of lower lobe of right lung     biopsy negative per pt    Skin cancer 2019    Stage 3 chronic kidney disease ([address] 2019       Past Surgical History:   Procedure Laterality Date    CHOLECYSTECTOMY  2013    COLONOSCOPY      polyp removed    HC  PICC POWERPIC SINGLE  2021         LIVER BIOPSY  2014    negative per pt    LUNG BIOPSY  2013    Right; negative per pt    LYMPH NODE BIOPSY Right 2014    PICC LINE INSERTION NURSE  2021         THORACOTOMY Right 2014       Family History   Family history unknown: Yes       Social History     Socioeconomic History    Marital status:      Spouse name: Not on file    Number of children: Not on file    Years of education: Not on file    Highest education level: Not on file   Occupational History    Not on file   Tobacco Use    Smoking status: Former Smoker     Packs/day: 1.00     Years: 30.00     Pack years: 30.00     Types: Cigarettes     Start date: 1958     Quit date: 1988     Years since quittin.1    Smokeless tobacco: Never Used   Vaping Use    Vaping Use: Former   Substance and Sexual Activity    Alcohol use: No    Drug use: No    Sexual activity: Not on file   Other Topics Concern    Not on file   Social History Narrative    Not on file     Social Determinants of Health     Financial Resource Strain: Low Risk     Difficulty of Paying Living Expenses: Not hard at all   Food Insecurity: No Food Insecurity    Worried About Running Out of Food in the Last Year: Never true    Varinder of Food in the Last rebound. Musculoskeletal:         General: Normal range of motion. Cervical back: Normal range of motion and neck supple. Lymphadenopathy:      Cervical: No cervical adenopathy. Skin:     General: Skin is warm and dry. Neurological:      Mental Status: He is alert and oriented to person, place, and time. Cranial Nerves: No cranial nerve deficit. Psychiatric:         Judgment: Judgment normal.         Assessment and Plan:    Diagnoses and all orders for this visit:    Abdominal wall abscess  - S/p IR drain   - s/p IV atb invanz per ID   - (1/22) - s/p incision & drainage of a right flank abscess and diagnostic laparoscopy w appendectomy for actinomycosis on 1/18/22. - off rocephin and doxycycline   - off augmentin  - now on amoxicillin    - following with [facility] ID and gen surgery   - still with some drainage.       Increased prostate specific antigen (PSA) velocity  - recommend referral to urology   - has seen urology - did not recommend biopsy     Stage 3a chronic kidney disease ([facility] [address]  - uncertain etiology   - poss meds vs HTN  - US kidney (9/2019) - ok   - avoid nsaids   - increase fluids   - stop losartan only 1 moths ago (5/2022)   - add amlodipine for blood pressure   - repeat US   - refer to nephrology     Benign essential hypertension  - watch diet - low sodium  - monitor blood pressure at home  - stop lisinopril - cough  - on losartan  - will stop with worsening kidney dysfunction - stopped (5/2022)   - added amlodpine 5mg daily   - Stable 6/27/2022    Mixed hyperlipidemia  - watch diet  - on lovastatin   - follow labs     GERD without esophagitis  - watch diet  - on omeprazole   - discussed long term use complications   - stable     Impaired fasting glucose  - watch diet   - follow A1c - last 5.4 (4/2022)     Vitamin D deficiency  - on otc supplement   - follow labs     Hypothyroidism, unspecified type  - on levothyroxine - increased dose to 50mcg (4/2022)   - labs lab (6/2022) - improved  -  follow labs     Skin cancer  - following with dermatology   - (5/21) -biopsy of lesion left jaw line squamous cell ca   - (5/22) - basal e cell jaw line     MCI (mild cognitive impairment)  - MMSE (1/2019) score 28-29/30  - b12 was low normal - on otc supplement  - thyroid slight underactive by tsh at 5 now 8.3 - replace  - may need thyroid treatment and b12 IM  - recheck  - maintain socialization    Other insomnia  - improved   - off meds   - stable     Dizziness  - improved with hydration     Contracture of hand  - referral to ortho hand if needed      No follow-ups on file. No orders of the defined types were placed in this encounter.     Requested Prescriptions      No prescriptions requested or ordered in this encounter       Anca Khan MD  6/27/2022  3:08 PM none

## 2022-07-21 NOTE — ED ADULT NURSE REASSESSMENT NOTE - NS ED NURSE REASSESS COMMENT FT1
received pt awake alert not in distress,with saline lock intact no redness no swelling noted.waiting for bed. No

## 2023-02-04 NOTE — DIETITIAN INITIAL EVALUATION ADULT. - PROBLEM SELECTOR PROBLEM 5
Patient with Paroxysmal (<7 days) atrial fibrillation which is controlled currently with Beta Blocker. Patient is currently in sinus rhythm.[id number] Score: 5. HASBLED Score: . Anticoagulation indicated. Anticoagulation done with asa.       Need for prophylactic measure

## 2023-12-06 NOTE — PATIENT PROFILE ADULT - VISION (WITH CORRECTIVE LENSES IF THE PATIENT USUALLY WEARS THEM):
Managed per primary team  Infection may elevate BG readings       Normal vision: sees adequately in most situations; can see medication labels, newsprint

## 2023-12-08 NOTE — H&P ADULT - PROBLEM SELECTOR PLAN 1
-Patient is presenting with poor PO intake, he is cachectic, contracted extremities   -Could be secondary to worsening dementia , depression or underlying infectious/metabolic  causes   -TSH elevated, start levothyroxine, patient used to on it but not seen in current medications brought by wife.   -Follow B12, RPR , UA  -Follow SnS evaluation  -C/w pureed diet General

## 2024-05-14 NOTE — PATIENT PROFILE ADULT. - DOES PATIENT HAVE ADVANCE DIRECTIVE
Varicose Veins   AMBULATORY CARE:   Varicose veins  are veins that become large, twisted, and swollen. They are common on the back of the calves, knees, and thighs. Varicose veins are caused by valves in your veins that do not work properly. This causes blood to collect and increase pressure in the veins of your legs. The increased pressure causes your veins to stretch, get larger, swell, and twist.       Common signs and symptoms:   Blue or purple bulging veins in your legs    Pain, swelling, or muscle cramps in your legs    Feeling of tiredness or heaviness in your legs    Seek care immediately if:   You have an injury that has broken your skin and caused your varicose veins to bleed.    Your leg is swollen and hard.    Your legs or feet are turning blue or black.    Your leg feels warm, tender, and painful. It may look swollen and red.    You have a wound on your leg that does not heal or is infected.    Call your doctor if:   You have pain in your leg that does not go away or gets worse.    You have large bruising on your legs.    You have a rash on your leg.    Your symptoms keep you from doing your daily activities.    You have questions or concerns about your condition or care.    Treatment of varicose veins  aims to decrease symptoms, improve appearance, and prevent further problems. Treatment will depend on which veins are affected and how severe your symptoms are. You may need a procedure to treat or remove your varicose veins. For example, your provider may inject a solution or use a laser to close the varicose veins. Surgery to remove long veins may also be done. Ask your provider for more information about procedures used to treat varicose veins.  Manage varicose veins:   Maintain a healthy weight.  Excess weight or obesity can make your varicose veins worse. Ask your provider what a healthy weight is for you. Ask your provider to help you create a weight loss plan, if needed.    Wear pressure stockings  as directed.  The stockings are tight and put pressure on your legs. This improves blood flow and helps prevent clots.         Elevate your legs.  Keep them above the level of your heart as often as you can. Prop your legs on pillows or blankets to keep them elevated comfortably. This will help blood flow back to your heart and decrease pain and swelling.         Get regular exercise.  Talk to your provider about the best exercise plan for you. Exercise can improve blood flow to your legs and feet.       Prevent your varicose veins from getting worse:   Do not sit or stand for long periods of time, if possible.  This can cause the blood to collect in your legs and make your symptoms worse. Bend or rotate your ankles several times every hour. Walk around for a few minutes every hour to get blood moving in your legs.    Do not smoke.  Nicotine and other chemicals in cigarettes and cigars can cause lung damage. Ask your healthcare provider for information if you currently smoke and need help to quit. E-cigarettes or smokeless tobacco still contain nicotine. Talk to your provider before you use these products.    Do not wear tight clothes or high-heeled shoes.  Avoid clothes that are tight around your waist and knees. Wear clothes that fit well to help blood flow in your legs. Your symptoms may get worse if you wear high-heeled shoes.    Do not cross your legs when you sit.  This decreases blood flow to your feet and can make your symptoms worse. Make sure your feet can reach the chair's footrest or ground when you sit.    Follow up with your doctor as directed:  Write down your questions so you remember to ask them during your visits.  © Copyright [employer] 2023 Information is for End User's use only and may not be sold, redistributed or otherwise used for commercial purposes.  The above information is an  only. It is not intended as medical advice for individual conditions or treatments. Talk to your  doctor, nurse or pharmacist before following any medical regimen to see if it is safe and effective for you.     No

## 2024-08-27 NOTE — ED ADULT NURSE NOTE - NSSISCREENINGQ4_ED_A_ED
Problem: Discharge Planning  Goal: Discharge to home or other facility with appropriate resources  Outcome: Progressing     Problem: Pain  Goal: Verbalizes/displays adequate comfort level or baseline comfort level  Outcome: Progressing     Problem: Safety - Adult  Goal: Free from fall injury  Outcome: Progressing     Problem: Chronic Conditions and Co-morbidities  Goal: Patient's chronic conditions and co-morbidity symptoms are monitored and maintained or improved  Outcome: Progressing      No

## 2025-03-16 NOTE — ED PROVIDER NOTE - OBJECTIVE STATEMENT
Patient identified using two patient identifiers.  Ear exam showing wax occlusion completed by provider.  Solution: warm water was placed in the left ear(s) via irrigation tool: elephant ear  Gita Raymundo CMA  .    
86 y/o M pt with PMHx of DM, Hypothyroidism, and HTN and no PSHx presents to ED c/o dizziness and lightheadedness resulting in mechanical fall today. Pt claims L lower leg pain s/p fall as well. Pt also claims his dizziness has been ongoing for 2 months. Pt denies LOC, numbness, tingling, weakness, SOB, CP, fevers, chills, nausea, vomiting, or any other complaints. Pt also denies recent illness. NKDA.